# Patient Record
Sex: FEMALE | Race: WHITE | NOT HISPANIC OR LATINO | Employment: FULL TIME | URBAN - NONMETROPOLITAN AREA
[De-identification: names, ages, dates, MRNs, and addresses within clinical notes are randomized per-mention and may not be internally consistent; named-entity substitution may affect disease eponyms.]

---

## 2017-04-12 ENCOUNTER — APPOINTMENT (OUTPATIENT)
Dept: LAB | Facility: HOSPITAL | Age: 37
End: 2017-04-12
Attending: INTERNAL MEDICINE

## 2017-04-12 ENCOUNTER — TRANSCRIBE ORDERS (OUTPATIENT)
Dept: ADMINISTRATIVE | Facility: HOSPITAL | Age: 37
End: 2017-04-12

## 2017-04-12 DIAGNOSIS — E78.2 MIXED HYPERLIPIDEMIA: Primary | ICD-10-CM

## 2017-04-12 DIAGNOSIS — IMO0002 TYPE II DIABETES MELLITUS WITH COMA, UNCONTROLLED: ICD-10-CM

## 2017-04-12 DIAGNOSIS — I10 HYPERTENSION, ESSENTIAL: ICD-10-CM

## 2017-04-12 LAB
ALBUMIN SERPL-MCNC: 4 G/DL (ref 3.5–5)
ALBUMIN/GLOB SERPL: 1.2 G/DL (ref 1.1–2.5)
ALP SERPL-CCNC: 77 U/L (ref 24–120)
ALT SERPL W P-5'-P-CCNC: 19 U/L (ref 0–54)
ANION GAP SERPL CALCULATED.3IONS-SCNC: 12 MMOL/L (ref 4–13)
ARTICHOKE IGE QN: 82 MG/DL (ref 0–99)
AST SERPL-CCNC: 26 U/L (ref 7–45)
BILIRUB SERPL-MCNC: 0.5 MG/DL (ref 0.1–1)
BUN BLD-MCNC: 13 MG/DL (ref 5–21)
BUN/CREAT SERPL: 21 (ref 7–25)
CALCIUM SPEC-SCNC: 9.3 MG/DL (ref 8.4–10.4)
CHLORIDE SERPL-SCNC: 101 MMOL/L (ref 98–110)
CHOLEST SERPL-MCNC: 146 MG/DL (ref 130–200)
CO2 SERPL-SCNC: 27 MMOL/L (ref 24–31)
CREAT BLD-MCNC: 0.62 MG/DL (ref 0.5–1.4)
GFR SERPL CREATININE-BSD FRML MDRD: 109 ML/MIN/1.73
GLOBULIN UR ELPH-MCNC: 3.3 GM/DL
GLUCOSE BLD-MCNC: 164 MG/DL (ref 70–100)
HBA1C MFR BLD: 7.2 %
HDLC SERPL-MCNC: 44 MG/DL
LDLC/HDLC SERPL: 1.58 {RATIO}
POTASSIUM BLD-SCNC: 3.6 MMOL/L (ref 3.5–5.3)
PROT SERPL-MCNC: 7.3 G/DL (ref 6.3–8.7)
SODIUM BLD-SCNC: 140 MMOL/L (ref 135–145)
TRIGL SERPL-MCNC: 163 MG/DL (ref 0–149)

## 2017-04-12 PROCEDURE — 80053 COMPREHEN METABOLIC PANEL: CPT | Performed by: INTERNAL MEDICINE

## 2017-04-12 PROCEDURE — 36415 COLL VENOUS BLD VENIPUNCTURE: CPT | Performed by: INTERNAL MEDICINE

## 2017-04-12 PROCEDURE — 80061 LIPID PANEL: CPT | Performed by: INTERNAL MEDICINE

## 2017-04-12 PROCEDURE — 83036 HEMOGLOBIN GLYCOSYLATED A1C: CPT | Performed by: INTERNAL MEDICINE

## 2017-09-18 PROBLEM — E66.01 MORBID OBESITY DUE TO EXCESS CALORIES (HCC): Status: ACTIVE | Noted: 2017-09-18

## 2017-09-18 PROBLEM — I10 ESSENTIAL HYPERTENSION: Status: ACTIVE | Noted: 2017-09-18

## 2017-09-18 PROBLEM — M54.50 CHRONIC MIDLINE LOW BACK PAIN WITHOUT SCIATICA: Status: ACTIVE | Noted: 2017-09-18

## 2017-09-18 PROBLEM — E11.9 TYPE 2 DIABETES MELLITUS WITHOUT COMPLICATION, WITHOUT LONG-TERM CURRENT USE OF INSULIN (HCC): Status: ACTIVE | Noted: 2017-09-18

## 2017-09-18 PROBLEM — E78.2 MIXED HYPERLIPIDEMIA: Status: ACTIVE | Noted: 2017-09-18

## 2017-09-18 PROBLEM — K76.0 FATTY LIVER: Status: ACTIVE | Noted: 2017-09-18

## 2017-09-18 PROBLEM — G89.29 CHRONIC MIDLINE LOW BACK PAIN WITHOUT SCIATICA: Status: ACTIVE | Noted: 2017-09-18

## 2017-09-19 PROBLEM — F41.1 GENERALIZED ANXIETY DISORDER: Status: ACTIVE | Noted: 2017-09-19

## 2017-09-19 PROBLEM — Z00.00 ANNUAL PHYSICAL EXAM: Status: ACTIVE | Noted: 2017-09-19

## 2017-09-20 DIAGNOSIS — E78.2 MIXED HYPERLIPIDEMIA: ICD-10-CM

## 2017-09-20 DIAGNOSIS — E11.9 TYPE 2 DIABETES MELLITUS WITHOUT COMPLICATION, WITHOUT LONG-TERM CURRENT USE OF INSULIN (HCC): ICD-10-CM

## 2017-09-20 DIAGNOSIS — E66.01 MORBID OBESITY DUE TO EXCESS CALORIES (HCC): ICD-10-CM

## 2017-09-20 DIAGNOSIS — K76.0 FATTY LIVER: ICD-10-CM

## 2017-09-20 DIAGNOSIS — Z00.00 ANNUAL PHYSICAL EXAM: ICD-10-CM

## 2017-09-20 DIAGNOSIS — I10 ESSENTIAL HYPERTENSION: ICD-10-CM

## 2017-12-05 RX ORDER — NEBIVOLOL 10 MG/1
10 TABLET ORAL EVERY MORNING
Qty: 30 TABLET | Refills: 0 | Status: SHIPPED | OUTPATIENT
Start: 2017-12-05 | End: 2017-12-18 | Stop reason: SDUPTHER

## 2017-12-05 RX ORDER — ATORVASTATIN CALCIUM 40 MG/1
40 TABLET, FILM COATED ORAL DAILY
Qty: 30 TABLET | Refills: 0 | Status: SHIPPED | OUTPATIENT
Start: 2017-12-05 | End: 2017-12-18 | Stop reason: SDUPTHER

## 2017-12-15 ENCOUNTER — TRANSCRIBE ORDERS (OUTPATIENT)
Dept: ADMINISTRATIVE | Facility: HOSPITAL | Age: 37
End: 2017-12-15

## 2017-12-15 ENCOUNTER — APPOINTMENT (OUTPATIENT)
Dept: LAB | Facility: HOSPITAL | Age: 37
End: 2017-12-15
Attending: INTERNAL MEDICINE

## 2017-12-15 DIAGNOSIS — E11.9 TYPE 2 DIABETES MELLITUS WITHOUT COMPLICATION, WITHOUT LONG-TERM CURRENT USE OF INSULIN (HCC): ICD-10-CM

## 2017-12-15 DIAGNOSIS — K76.0 FATTY LIVER: ICD-10-CM

## 2017-12-15 DIAGNOSIS — E78.2 MIXED HYPERLIPIDEMIA: ICD-10-CM

## 2017-12-15 DIAGNOSIS — E66.01 MORBID OBESITY DUE TO EXCESS CALORIES (HCC): ICD-10-CM

## 2017-12-15 DIAGNOSIS — E11.65 TYPE 2 DIABETES MELLITUS WITH HYPERGLYCEMIA, WITHOUT LONG-TERM CURRENT USE OF INSULIN (HCC): ICD-10-CM

## 2017-12-15 DIAGNOSIS — Z00.00 ROUTINE GENERAL MEDICAL EXAMINATION AT A HEALTH CARE FACILITY: ICD-10-CM

## 2017-12-15 DIAGNOSIS — I10 ESSENTIAL HYPERTENSION: ICD-10-CM

## 2017-12-15 DIAGNOSIS — I10 HYPERTENSION, UNSPECIFIED TYPE: ICD-10-CM

## 2017-12-15 DIAGNOSIS — Z00.00 ANNUAL PHYSICAL EXAM: ICD-10-CM

## 2017-12-15 DIAGNOSIS — E78.2 MIXED HYPERLIPIDEMIA: Primary | ICD-10-CM

## 2017-12-15 LAB
ALBUMIN SERPL-MCNC: 4.1 G/DL (ref 3.5–5)
ALBUMIN/GLOB SERPL: 1.4 G/DL (ref 1.1–2.5)
ALP SERPL-CCNC: 68 U/L (ref 24–120)
ALT SERPL W P-5'-P-CCNC: 39 U/L (ref 0–54)
ANION GAP SERPL CALCULATED.3IONS-SCNC: 13 MMOL/L (ref 4–13)
ARTICHOKE IGE QN: 91 MG/DL (ref 0–99)
AST SERPL-CCNC: 20 U/L (ref 7–45)
AVERAGE GLUCOSE: NORMAL
BILIRUB SERPL-MCNC: 0.4 MG/DL (ref 0.1–1)
BILIRUB UR QL STRIP: NEGATIVE
BUN BLD-MCNC: 12 MG/DL (ref 5–21)
BUN/CREAT SERPL: 21.1 (ref 7–25)
CALCIUM SPEC-SCNC: 8.7 MG/DL (ref 8.4–10.4)
CHLORIDE SERPL-SCNC: 104 MMOL/L (ref 98–110)
CHOLEST SERPL-MCNC: 152 MG/DL (ref 130–200)
CLARITY UR: CLEAR
CO2 SERPL-SCNC: 22 MMOL/L (ref 24–31)
COLOR UR: YELLOW
CREAT BLD-MCNC: 0.57 MG/DL (ref 0.5–1.4)
DEPRECATED RDW RBC AUTO: 41.1 FL (ref 40–54)
ERYTHROCYTE [DISTWIDTH] IN BLOOD BY AUTOMATED COUNT: 12.9 % (ref 12–15)
GFR SERPL CREATININE-BSD FRML MDRD: 120 ML/MIN/1.73
GLOBULIN UR ELPH-MCNC: 2.9 GM/DL
GLUCOSE BLD-MCNC: 173 MG/DL (ref 70–100)
GLUCOSE UR STRIP-MCNC: NEGATIVE MG/DL
HBA1C MFR BLD: 7.9 %
HBA1C MFR BLD: 7.9 %
HCT VFR BLD AUTO: 38.3 % (ref 37–47)
HDLC SERPL-MCNC: 48 MG/DL
HGB BLD-MCNC: 12.9 G/DL (ref 12–16)
HGB UR QL STRIP.AUTO: NEGATIVE
KETONES UR QL STRIP: ABNORMAL
LDLC/HDLC SERPL: 1.33 {RATIO}
LEUKOCYTE ESTERASE UR QL STRIP.AUTO: NEGATIVE
MCH RBC QN AUTO: 29.1 PG (ref 28–32)
MCHC RBC AUTO-ENTMCNC: 33.7 G/DL (ref 33–36)
MCV RBC AUTO: 86.5 FL (ref 82–98)
NITRITE UR QL STRIP: NEGATIVE
PH UR STRIP.AUTO: <=5 [PH] (ref 5–8)
PLATELET # BLD AUTO: 263 10*3/MM3 (ref 130–400)
PMV BLD AUTO: 12.4 FL (ref 6–12)
POTASSIUM BLD-SCNC: 4.1 MMOL/L (ref 3.5–5.3)
PROT SERPL-MCNC: 7 G/DL (ref 6.3–8.7)
PROT UR QL STRIP: NEGATIVE
RBC # BLD AUTO: 4.43 10*6/MM3 (ref 4.2–5.4)
SODIUM BLD-SCNC: 139 MMOL/L (ref 135–145)
SP GR UR STRIP: 1.03 (ref 1–1.03)
TRIGL SERPL-MCNC: 200 MG/DL (ref 0–149)
TSH SERPL DL<=0.05 MIU/L-ACNC: 2.04 MIU/ML (ref 0.47–4.68)
UROBILINOGEN UR QL STRIP: ABNORMAL
WBC NRBC COR # BLD: 10.34 10*3/MM3 (ref 4.8–10.8)

## 2017-12-15 PROCEDURE — 36415 COLL VENOUS BLD VENIPUNCTURE: CPT

## 2017-12-15 PROCEDURE — 85027 COMPLETE CBC AUTOMATED: CPT | Performed by: INTERNAL MEDICINE

## 2017-12-15 PROCEDURE — 84443 ASSAY THYROID STIM HORMONE: CPT | Performed by: INTERNAL MEDICINE

## 2017-12-15 PROCEDURE — 81003 URINALYSIS AUTO W/O SCOPE: CPT | Performed by: INTERNAL MEDICINE

## 2017-12-15 PROCEDURE — 80053 COMPREHEN METABOLIC PANEL: CPT | Performed by: INTERNAL MEDICINE

## 2017-12-15 PROCEDURE — 83036 HEMOGLOBIN GLYCOSYLATED A1C: CPT | Performed by: INTERNAL MEDICINE

## 2017-12-15 PROCEDURE — 80061 LIPID PANEL: CPT | Performed by: INTERNAL MEDICINE

## 2017-12-15 RX ORDER — VALSARTAN AND HYDROCHLOROTHIAZIDE 320; 25 MG/1; MG/1
1 TABLET, FILM COATED ORAL DAILY
COMMUNITY
End: 2017-12-18 | Stop reason: SDUPTHER

## 2017-12-15 RX ORDER — GLIPIZIDE 5 MG/1
5 TABLET ORAL
COMMUNITY
End: 2017-12-18 | Stop reason: SDUPTHER

## 2017-12-15 RX ORDER — ESCITALOPRAM OXALATE 10 MG/1
10 TABLET ORAL DAILY
COMMUNITY
End: 2017-12-18 | Stop reason: SDUPTHER

## 2017-12-18 ENCOUNTER — OFFICE VISIT (OUTPATIENT)
Dept: INTERNAL MEDICINE | Age: 37
End: 2017-12-18
Payer: COMMERCIAL

## 2017-12-18 VITALS
WEIGHT: 258.8 LBS | HEIGHT: 63 IN | SYSTOLIC BLOOD PRESSURE: 122 MMHG | BODY MASS INDEX: 45.86 KG/M2 | HEART RATE: 92 BPM | DIASTOLIC BLOOD PRESSURE: 70 MMHG | OXYGEN SATURATION: 98 %

## 2017-12-18 DIAGNOSIS — Z00.00 ANNUAL PHYSICAL EXAM: ICD-10-CM

## 2017-12-18 DIAGNOSIS — E66.01 MORBID OBESITY DUE TO EXCESS CALORIES (HCC): ICD-10-CM

## 2017-12-18 DIAGNOSIS — E78.2 MIXED HYPERLIPIDEMIA: ICD-10-CM

## 2017-12-18 DIAGNOSIS — E11.9 TYPE 2 DIABETES MELLITUS WITHOUT COMPLICATION, WITHOUT LONG-TERM CURRENT USE OF INSULIN (HCC): ICD-10-CM

## 2017-12-18 DIAGNOSIS — K76.0 FATTY LIVER: ICD-10-CM

## 2017-12-18 DIAGNOSIS — F41.1 GENERALIZED ANXIETY DISORDER: ICD-10-CM

## 2017-12-18 DIAGNOSIS — Z00.00 ANNUAL PHYSICAL EXAM: Primary | ICD-10-CM

## 2017-12-18 DIAGNOSIS — I10 ESSENTIAL HYPERTENSION: ICD-10-CM

## 2017-12-18 DIAGNOSIS — Z23 NEED FOR PNEUMOCOCCAL VACCINE: ICD-10-CM

## 2017-12-18 PROCEDURE — 90732 PPSV23 VACC 2 YRS+ SUBQ/IM: CPT | Performed by: INTERNAL MEDICINE

## 2017-12-18 PROCEDURE — 90471 IMMUNIZATION ADMIN: CPT | Performed by: INTERNAL MEDICINE

## 2017-12-18 PROCEDURE — 99395 PREV VISIT EST AGE 18-39: CPT | Performed by: INTERNAL MEDICINE

## 2017-12-18 RX ORDER — ATORVASTATIN CALCIUM 40 MG/1
40 TABLET, FILM COATED ORAL DAILY
Qty: 30 TABLET | Refills: 5 | Status: SHIPPED | OUTPATIENT
Start: 2017-12-18 | End: 2018-07-02 | Stop reason: SDUPTHER

## 2017-12-18 RX ORDER — GLIPIZIDE 5 MG/1
5 TABLET ORAL
Qty: 60 TABLET | Refills: 5 | Status: SHIPPED | OUTPATIENT
Start: 2017-12-18 | End: 2017-12-18 | Stop reason: SDUPTHER

## 2017-12-18 RX ORDER — VALSARTAN AND HYDROCHLOROTHIAZIDE 320; 25 MG/1; MG/1
1 TABLET, FILM COATED ORAL DAILY
Qty: 30 TABLET | Refills: 5 | Status: SHIPPED | OUTPATIENT
Start: 2017-12-18 | End: 2018-07-02 | Stop reason: SDUPTHER

## 2017-12-18 RX ORDER — GLIPIZIDE 5 MG/1
TABLET ORAL
Qty: 75 TABLET | Refills: 5 | Status: SHIPPED | OUTPATIENT
Start: 2017-12-18 | End: 2018-07-02 | Stop reason: SDUPTHER

## 2017-12-18 RX ORDER — ESCITALOPRAM OXALATE 10 MG/1
10 TABLET ORAL DAILY
Qty: 30 TABLET | Refills: 5 | Status: SHIPPED | OUTPATIENT
Start: 2017-12-18 | End: 2018-03-20

## 2017-12-18 RX ORDER — NEBIVOLOL 10 MG/1
10 TABLET ORAL EVERY MORNING
Qty: 30 TABLET | Refills: 5 | Status: SHIPPED | OUTPATIENT
Start: 2017-12-18 | End: 2018-07-02 | Stop reason: SDUPTHER

## 2017-12-18 ASSESSMENT — ENCOUNTER SYMPTOMS
COLOR CHANGE: 0
VOMITING: 0
DIARRHEA: 0
BLOOD IN STOOL: 0
ABDOMINAL PAIN: 0
SORE THROAT: 0
EYE PAIN: 0
NAUSEA: 0
CHEST TIGHTNESS: 0
COUGH: 0
SINUS PRESSURE: 0
CONSTIPATION: 0
TROUBLE SWALLOWING: 0
EYE REDNESS: 0
WHEEZING: 0
VOICE CHANGE: 0

## 2017-12-18 NOTE — PATIENT INSTRUCTIONS
Patient Education        Learning About Low-Carbohydrate Diets for Weight Loss  What is a low-carbohydrate diet? Low-carb diets avoid foods that are high in carbohydrate. These high-carb foods include pasta, bread, rice, cereal, fruits, and starchy vegetables. Instead, these diets usually have you eat foods that are high in fat and protein. Many people lose weight quickly on a low-carb diet. But the early weight loss is water. People on this diet often gain the weight back after they start eating carbs again. Not all diet plans are safe or work well. A lot of the evidence shows that low-carb diets aren't healthy. That's because these diets often don't include healthy foods like fruits and vegetables. Losing weight safely means balancing protein, fat, and carbs with every meal and snack. And low-carb diets don't always provide the vitamins, minerals, and fiber you need. If you have a serious medical condition, talk to your doctor before you try any diet. These conditions include kidney disease, heart disease, type 2 diabetes, high cholesterol, and high blood pressure. If you are pregnant, it may not be safe for your baby if you are on a low-carb diet. How can you lose weight safely? You might have heard that a diet plan helped another person lose weight. But that doesn't mean that it will work for you. It is very hard to stay on a diet that includes lots of big changes in your eating habits. If you want to get to a healthy weight and stay there, making healthy lifestyle changes will often work better than dieting. These steps can help. · Make a plan for change. Work with your doctor to create a plan that is right for you. · See a dietitian. He or she can show you how to make healthy changes in your eating habits. · Manage stress. If you have a lot of stress in your life, it can be hard to focus on making healthy changes to your daily habits. · Track your food and activity.  You are likely to do better at losing weight if you keep track of what you eat and what you do. Follow-up care is a key part of your treatment and safety. Be sure to make and go to all appointments, and call your doctor if you are having problems. It's also a good idea to know your test results and keep a list of the medicines you take. Where can you learn more? Go to [email or web address] and sign in to your [employer] account. Enter A121 in the [employer] box to learn more about \"Learning About Low-Carbohydrate Diets for Weight Loss. \"     If you do not have an account, please click on the \"Sign Up Now\" link. Current as of: May 12, 2017  Content Version: 11.4  © 0219-4674 Healthwise, Incorporated. Care instructions adapted under license by [facility] ([facility]). If you have questions about a medical condition or this instruction, always ask your healthcare professional. [address] any warranty or liability for your use of this information.

## 2017-12-18 NOTE — PROGRESS NOTES
weight and stay there, making healthy lifestyle changes will often work better than dieting. These steps can help. · Make a plan for change. Work with your doctor to create a plan that is right for you. · See a dietitian. He or she can show you how to make healthy changes in your eating habits. · Manage stress. If you have a lot of stress in your life, it can be hard to focus on making healthy changes to your daily habits. · Track your food and activity. You are likely to do better at losing weight if you keep track of what you eat and what you do. Follow-up care is a key part of your treatment and safety. Be sure to make and go to all appointments, and call your doctor if you are having problems. It's also a good idea to know your test results and keep a list of the medicines you take. Where can you learn more? Go to [email or web address] and sign in to your [employer] account. Enter A121 in the [employer] box to learn more about \"Learning About Low-Carbohydrate Diets for Weight Loss. \"     If you do not have an account, please click on the \"Sign Up Now\" link. Current as of: May 12, 2017  Content Version: 11.4  © 8000-6270 Healthwise, [employer]. Care instructions adapted under license by [facility] ([facility]). If you have questions about a medical condition or this instruction, always ask your healthcare professional. [address] any warranty or liability for your use of this information. Return in about 3 months (around 3/18/2018) for Medication check. EMR Dragon/transcription disclaimer:Significant part of this  encounter note is electronic transcription/translation of spoken language to printed text. The electronic translation of spoken language may be erroneous, or at times, nonsensical words or phrases may be inadvertently transcribed.  Although I have reviewed the note for such errors, some may still exist.

## 2017-12-29 ENCOUNTER — PATIENT MESSAGE (OUTPATIENT)
Dept: INTERNAL MEDICINE | Age: 37
End: 2017-12-29

## 2017-12-29 DIAGNOSIS — M54.2 CERVICALGIA: Primary | ICD-10-CM

## 2017-12-29 DIAGNOSIS — M54.50 CHRONIC MIDLINE LOW BACK PAIN WITHOUT SCIATICA: ICD-10-CM

## 2017-12-29 DIAGNOSIS — G89.29 CHRONIC MIDLINE LOW BACK PAIN WITHOUT SCIATICA: ICD-10-CM

## 2017-12-29 NOTE — TELEPHONE ENCOUNTER
From: Anisa Roberts  To: Mandy Rush MD  Sent: 12/29/2017 8:37 AM CST  Subject: Non-Urgent Medical Question    As you know, I used to see Dr. Moraima Anguiano for my headaches from my bulging discs. After several treatments, nothing worked for me, and I hate taking pain pills, so I stopped going. One of my friends used to work for Dr. Haroon Alegria, and highly recommended him. Can you refer me for a second opinion to him? And do you have my records from Dr. Moraima Anguiano or do I need to get those?

## 2017-12-30 NOTE — TELEPHONE ENCOUNTER
That would be fine to refer her to  Dr Rosemarie Obrien  Please call pt  To let her know  Also, dr Eliz Garcia records should be in EPIC= let me know if you can not fin them

## 2018-03-15 ENCOUNTER — APPOINTMENT (OUTPATIENT)
Dept: LAB | Facility: HOSPITAL | Age: 38
End: 2018-03-15
Attending: INTERNAL MEDICINE

## 2018-03-15 ENCOUNTER — TRANSCRIBE ORDERS (OUTPATIENT)
Dept: ADMINISTRATIVE | Facility: HOSPITAL | Age: 38
End: 2018-03-15

## 2018-03-15 DIAGNOSIS — K76.0 FATTY LIVER: ICD-10-CM

## 2018-03-15 DIAGNOSIS — E11.9 TYPE 2 DIABETES MELLITUS WITHOUT COMPLICATION, WITHOUT LONG-TERM CURRENT USE OF INSULIN (HCC): Primary | ICD-10-CM

## 2018-03-15 LAB
ALBUMIN SERPL-MCNC: 4.1 G/DL (ref 3.5–5)
ALBUMIN/GLOB SERPL: 1.3 G/DL (ref 1.1–2.5)
ALP SERPL-CCNC: 56 U/L (ref 24–120)
ALT SERPL W P-5'-P-CCNC: 24 U/L (ref 0–54)
ANION GAP SERPL CALCULATED.3IONS-SCNC: 14 MMOL/L (ref 4–13)
AST SERPL-CCNC: 17 U/L (ref 7–45)
BILIRUB SERPL-MCNC: 0.4 MG/DL (ref 0.1–1)
BUN BLD-MCNC: 14 MG/DL (ref 5–21)
BUN/CREAT SERPL: 21.5 (ref 7–25)
CALCIUM SPEC-SCNC: 9.3 MG/DL (ref 8.4–10.4)
CHLORIDE SERPL-SCNC: 103 MMOL/L (ref 98–110)
CO2 SERPL-SCNC: 25 MMOL/L (ref 24–31)
CREAT BLD-MCNC: 0.65 MG/DL (ref 0.5–1.4)
CREATININE URINE: NORMAL MG/DL
GFR SERPL CREATININE-BSD FRML MDRD: 103 ML/MIN/1.73
GLOBULIN UR ELPH-MCNC: 3.2 GM/DL
GLUCOSE BLD-MCNC: 148 MG/DL (ref 70–100)
HBA1C MFR BLD: 6.1 %
MICROALBUMIN/CREAT 24H UR: 13.2 MG/G{CREAT}
POTASSIUM BLD-SCNC: 3.8 MMOL/L (ref 3.5–5.3)
PROT SERPL-MCNC: 7.3 G/DL (ref 6.3–8.7)
SODIUM BLD-SCNC: 142 MMOL/L (ref 135–145)

## 2018-03-15 PROCEDURE — 82043 UR ALBUMIN QUANTITATIVE: CPT | Performed by: INTERNAL MEDICINE

## 2018-03-15 PROCEDURE — 80053 COMPREHEN METABOLIC PANEL: CPT | Performed by: INTERNAL MEDICINE

## 2018-03-15 PROCEDURE — 36415 COLL VENOUS BLD VENIPUNCTURE: CPT

## 2018-03-15 PROCEDURE — 82570 ASSAY OF URINE CREATININE: CPT | Performed by: INTERNAL MEDICINE

## 2018-03-15 PROCEDURE — 83036 HEMOGLOBIN GLYCOSYLATED A1C: CPT | Performed by: INTERNAL MEDICINE

## 2018-03-16 LAB
CREAT 24H UR-MCNC: 213.8 MG/DL
MICROALBUMIN UR-MCNC: 13.2 UG/ML
MICROALBUMIN/CREAT UR: 6.2 MG/G CREAT (ref 0–30)

## 2018-03-19 DIAGNOSIS — E11.9 TYPE 2 DIABETES MELLITUS WITHOUT COMPLICATION, WITHOUT LONG-TERM CURRENT USE OF INSULIN (HCC): ICD-10-CM

## 2018-03-20 ENCOUNTER — OFFICE VISIT (OUTPATIENT)
Dept: INTERNAL MEDICINE | Age: 38
End: 2018-03-20
Payer: COMMERCIAL

## 2018-03-20 VITALS
WEIGHT: 258 LBS | HEART RATE: 90 BPM | BODY MASS INDEX: 45.71 KG/M2 | DIASTOLIC BLOOD PRESSURE: 76 MMHG | SYSTOLIC BLOOD PRESSURE: 134 MMHG | OXYGEN SATURATION: 97 % | HEIGHT: 63 IN

## 2018-03-20 DIAGNOSIS — I10 ESSENTIAL HYPERTENSION: ICD-10-CM

## 2018-03-20 DIAGNOSIS — E78.2 MIXED HYPERLIPIDEMIA: ICD-10-CM

## 2018-03-20 DIAGNOSIS — E11.9 TYPE 2 DIABETES MELLITUS WITHOUT COMPLICATION, WITHOUT LONG-TERM CURRENT USE OF INSULIN (HCC): Primary | ICD-10-CM

## 2018-03-20 DIAGNOSIS — E66.01 MORBID OBESITY DUE TO EXCESS CALORIES (HCC): ICD-10-CM

## 2018-03-20 DIAGNOSIS — F41.1 GENERALIZED ANXIETY DISORDER: ICD-10-CM

## 2018-03-20 PROCEDURE — 99214 OFFICE O/P EST MOD 30 MIN: CPT | Performed by: INTERNAL MEDICINE

## 2018-03-20 RX ORDER — BUPROPION HYDROCHLORIDE 150 MG/1
TABLET ORAL
Qty: 45 TABLET | Refills: 0 | Status: SHIPPED | OUTPATIENT
Start: 2018-03-20 | End: 2018-04-03 | Stop reason: ALTCHOICE

## 2018-03-20 RX ORDER — IBUPROFEN 800 MG/1
800 TABLET ORAL EVERY 6 HOURS PRN
COMMUNITY
End: 2018-03-20

## 2018-03-20 RX ORDER — BUPROPION HYDROCHLORIDE 300 MG/1
300 TABLET ORAL EVERY MORNING
Qty: 30 TABLET | Refills: 3 | Status: SHIPPED | OUTPATIENT
Start: 2018-03-20 | End: 2018-04-03 | Stop reason: ALTCHOICE

## 2018-03-20 ASSESSMENT — ENCOUNTER SYMPTOMS
CHEST TIGHTNESS: 0
ABDOMINAL PAIN: 0
WHEEZING: 0
COUGH: 0
SORE THROAT: 0
CONSTIPATION: 0

## 2018-03-20 NOTE — PROGRESS NOTES
daily 30 tablet 5    nebivolol (BYSTOLIC) 10 MG tablet Take 1 tablet by mouth every morning 30 tablet 5    valsartan-hydrochlorothiazide (DIOVAN-HCT) 320-25 MG per tablet Take 1 tablet by mouth daily 30 tablet 5    Liraglutide (VICTOZA) 18 MG/3ML SOPN SC injection Inject 1.8 mg into the skin daily 3 pen 3    glipiZIDE (GLUCOTROL) 5 MG tablet Take  1.5 am and 1 pm (Patient taking differently: Take  1 in am) 75 tablet 5     No current facility-administered medications for this visit. Allergies   Allergen Reactions    Penicillins      Social History   Substance Use Topics    Smoking status: Former Smoker    Smokeless tobacco: Not on file    Alcohol use No      Family History   Problem Relation Age of Onset    Diabetes Mother     High Blood Pressure Mother     Diabetes Father        Review of Systems   Constitutional: Positive for fatigue. Negative for chills and fever. HENT: Negative for congestion, ear pain, nosebleeds, postnasal drip and sore throat. Respiratory: Negative for cough, chest tightness and wheezing. Cardiovascular: Negative for chest pain, palpitations and leg swelling. Gastrointestinal: Negative for abdominal pain and constipation. Genitourinary: Negative for dysuria and urgency. Musculoskeletal: Negative. Negative for arthralgias. Skin: Negative for rash. Neurological: Negative for dizziness and headaches. Psychiatric/Behavioral: Negative. Vitals:    03/20/18 1510   BP: 134/76   Pulse: 90   SpO2: 97%   Weight: 258 lb (117 kg)   Height: 5' 3\" (1.6 m)     Body mass index is 45.7 kg/m². Physical Exam   Constitutional: She is oriented to person, place, and time. She appears well-developed and well-nourished. HENT:   Right Ear: External ear normal.   Left Ear: External ear normal.   Mouth/Throat: Oropharynx is clear and moist. No oropharyngeal exudate. Eyes: Conjunctivae are normal. Pupils are equal, round, and reactive to light. Neck: Neck supple.  No JVD New Prescriptions    BUPROPION (WELLBUTRIN XL) 150 MG EXTENDED RELEASE TABLET    Take one every am 2 weeks, then take 2 every am    BUPROPION (WELLBUTRIN XL) 300 MG EXTENDED RELEASE TABLET    Take 1 tablet by mouth every morning        Return in about 5 months (around 8/7/2018) for Medication check. There are no Patient Instructions on file for this visit. EMR Dragon/transcription disclaimer:Significant part of this  encounter note is electronic transcription/translation of spoken language to printed text. The electronic translation of spoken language may be erroneous, or at times, nonsensical words or phrases may be inadvertently transcribed.  Although I have reviewed the note for such errors, some may still exist.

## 2018-03-29 ENCOUNTER — PATIENT MESSAGE (OUTPATIENT)
Dept: INTERNAL MEDICINE | Age: 38
End: 2018-03-29

## 2018-04-03 RX ORDER — DESVENLAFAXINE 50 MG/1
50 TABLET, EXTENDED RELEASE ORAL EVERY MORNING
Qty: 30 TABLET | Refills: 1 | Status: SHIPPED | OUTPATIENT
Start: 2018-04-03 | End: 2018-06-04 | Stop reason: SDUPTHER

## 2018-04-12 PROBLEM — Z00.00 ANNUAL PHYSICAL EXAM: Status: RESOLVED | Noted: 2017-09-19 | Resolved: 2018-04-12

## 2018-06-04 RX ORDER — DESVENLAFAXINE 50 MG/1
50 TABLET, EXTENDED RELEASE ORAL EVERY MORNING
Qty: 30 TABLET | Refills: 1 | Status: SHIPPED | OUTPATIENT
Start: 2018-06-04 | End: 2018-07-02 | Stop reason: SDUPTHER

## 2018-07-02 RX ORDER — VALSARTAN AND HYDROCHLOROTHIAZIDE 320; 25 MG/1; MG/1
1 TABLET, FILM COATED ORAL DAILY
Qty: 30 TABLET | Refills: 5 | Status: SHIPPED | OUTPATIENT
Start: 2018-07-02 | End: 2018-08-10 | Stop reason: ALTCHOICE

## 2018-07-02 RX ORDER — DESVENLAFAXINE 50 MG/1
50 TABLET, EXTENDED RELEASE ORAL EVERY MORNING
Qty: 30 TABLET | Refills: 1 | Status: SHIPPED | OUTPATIENT
Start: 2018-07-02 | End: 2018-08-22

## 2018-07-02 RX ORDER — NEBIVOLOL 10 MG/1
10 TABLET ORAL EVERY MORNING
Qty: 30 TABLET | Refills: 5 | Status: SHIPPED | OUTPATIENT
Start: 2018-07-02 | End: 2019-01-04 | Stop reason: SDUPTHER

## 2018-07-02 RX ORDER — GLIPIZIDE 5 MG/1
TABLET ORAL
Qty: 75 TABLET | Refills: 5 | Status: SHIPPED | OUTPATIENT
Start: 2018-07-02 | End: 2018-08-22

## 2018-07-02 RX ORDER — ATORVASTATIN CALCIUM 40 MG/1
40 TABLET, FILM COATED ORAL DAILY
Qty: 30 TABLET | Refills: 5 | Status: SHIPPED | OUTPATIENT
Start: 2018-07-02 | End: 2019-01-17

## 2018-08-09 ENCOUNTER — TELEPHONE (OUTPATIENT)
Dept: INTERNAL MEDICINE | Age: 38
End: 2018-08-09

## 2018-08-10 RX ORDER — OLMESARTAN MEDOXOMIL AND HYDROCHLOROTHIAZIDE 40/12.5 40; 12.5 MG/1; MG/1
1 TABLET ORAL DAILY
Qty: 30 TABLET | Refills: 3 | Status: SHIPPED | OUTPATIENT
Start: 2018-08-10 | End: 2018-12-05 | Stop reason: SDUPTHER

## 2018-08-17 ENCOUNTER — TELEPHONE (OUTPATIENT)
Dept: INTERNAL MEDICINE | Age: 38
End: 2018-08-17

## 2018-08-17 ENCOUNTER — TRANSCRIBE ORDERS (OUTPATIENT)
Dept: ADMINISTRATIVE | Facility: HOSPITAL | Age: 38
End: 2018-08-17

## 2018-08-17 ENCOUNTER — APPOINTMENT (OUTPATIENT)
Dept: LAB | Facility: HOSPITAL | Age: 38
End: 2018-08-17
Attending: INTERNAL MEDICINE

## 2018-08-17 DIAGNOSIS — I10 ESSENTIAL HYPERTENSION: Primary | ICD-10-CM

## 2018-08-17 DIAGNOSIS — E11.9 TYPE 2 DIABETES MELLITUS WITHOUT COMPLICATION, UNSPECIFIED LONG TERM INSULIN USE STATUS: ICD-10-CM

## 2018-08-17 DIAGNOSIS — E78.2 MIXED HYPERLIPIDEMIA: ICD-10-CM

## 2018-08-17 LAB
ALBUMIN SERPL-MCNC: 4.7 G/DL (ref 3.5–5)
ALBUMIN/GLOB SERPL: 1.5 G/DL (ref 1.1–2.5)
ALP SERPL-CCNC: 74 U/L (ref 24–120)
ALT SERPL W P-5'-P-CCNC: 26 U/L (ref 0–54)
ANION GAP SERPL CALCULATED.3IONS-SCNC: 15 MMOL/L (ref 4–13)
ARTICHOKE IGE QN: 94 MG/DL (ref 0–99)
AST SERPL-CCNC: 17 U/L (ref 7–45)
BILIRUB SERPL-MCNC: 0.8 MG/DL (ref 0.1–1)
BUN BLD-MCNC: 15 MG/DL (ref 5–21)
BUN/CREAT SERPL: 25 (ref 7–25)
CALCIUM SPEC-SCNC: 9.7 MG/DL (ref 8.4–10.4)
CHLORIDE SERPL-SCNC: 102 MMOL/L (ref 98–110)
CHOLEST SERPL-MCNC: 161 MG/DL (ref 130–200)
CO2 SERPL-SCNC: 25 MMOL/L (ref 24–31)
CREAT BLD-MCNC: 0.6 MG/DL (ref 0.5–1.4)
GFR SERPL CREATININE-BSD FRML MDRD: 112 ML/MIN/1.73
GLOBULIN UR ELPH-MCNC: 3.2 GM/DL
GLUCOSE BLD-MCNC: 164 MG/DL (ref 70–100)
HBA1C MFR BLD: 6.6 %
HDLC SERPL-MCNC: 41 MG/DL
LDLC/HDLC SERPL: 2.18 {RATIO}
POTASSIUM BLD-SCNC: 4.3 MMOL/L (ref 3.5–5.3)
PROT SERPL-MCNC: 7.9 G/DL (ref 6.3–8.7)
SODIUM BLD-SCNC: 142 MMOL/L (ref 135–145)
TRIGL SERPL-MCNC: 153 MG/DL (ref 0–149)

## 2018-08-17 PROCEDURE — 80061 LIPID PANEL: CPT | Performed by: INTERNAL MEDICINE

## 2018-08-17 PROCEDURE — 80053 COMPREHEN METABOLIC PANEL: CPT | Performed by: INTERNAL MEDICINE

## 2018-08-17 PROCEDURE — 36415 COLL VENOUS BLD VENIPUNCTURE: CPT

## 2018-08-17 PROCEDURE — 83036 HEMOGLOBIN GLYCOSYLATED A1C: CPT | Performed by: INTERNAL MEDICINE

## 2018-08-22 ENCOUNTER — OFFICE VISIT (OUTPATIENT)
Dept: INTERNAL MEDICINE | Age: 38
End: 2018-08-22
Payer: COMMERCIAL

## 2018-08-22 VITALS
WEIGHT: 230 LBS | BODY MASS INDEX: 40.75 KG/M2 | RESPIRATION RATE: 18 BRPM | DIASTOLIC BLOOD PRESSURE: 80 MMHG | OXYGEN SATURATION: 97 % | HEART RATE: 82 BPM | SYSTOLIC BLOOD PRESSURE: 110 MMHG | HEIGHT: 63 IN

## 2018-08-22 DIAGNOSIS — E11.9 TYPE 2 DIABETES MELLITUS WITHOUT COMPLICATION, WITHOUT LONG-TERM CURRENT USE OF INSULIN (HCC): Primary | ICD-10-CM

## 2018-08-22 DIAGNOSIS — I10 ESSENTIAL HYPERTENSION: ICD-10-CM

## 2018-08-22 DIAGNOSIS — E66.01 MORBID OBESITY DUE TO EXCESS CALORIES (HCC): ICD-10-CM

## 2018-08-22 DIAGNOSIS — E78.2 MIXED HYPERLIPIDEMIA: ICD-10-CM

## 2018-08-22 PROCEDURE — 99214 OFFICE O/P EST MOD 30 MIN: CPT | Performed by: INTERNAL MEDICINE

## 2018-08-22 RX ORDER — DESVENLAFAXINE 100 MG/1
100 TABLET, EXTENDED RELEASE ORAL DAILY
Qty: 90 TABLET | Refills: 3 | Status: SHIPPED | OUTPATIENT
Start: 2018-08-22 | End: 2019-04-22

## 2018-08-22 ASSESSMENT — PATIENT HEALTH QUESTIONNAIRE - PHQ9
SUM OF ALL RESPONSES TO PHQ QUESTIONS 1-9: 0
SUM OF ALL RESPONSES TO PHQ9 QUESTIONS 1 & 2: 0
2. FEELING DOWN, DEPRESSED OR HOPELESS: 0
SUM OF ALL RESPONSES TO PHQ QUESTIONS 1-9: 0
1. LITTLE INTEREST OR PLEASURE IN DOING THINGS: 0

## 2018-08-22 ASSESSMENT — ENCOUNTER SYMPTOMS
CHEST TIGHTNESS: 0
ABDOMINAL PAIN: 0
SORE THROAT: 0
WHEEZING: 0
CONSTIPATION: 0
COUGH: 0

## 2018-08-22 NOTE — PROGRESS NOTES
No respiratory distress. She has no wheezes. She has no rales. She exhibits no tenderness. Abdominal: Soft. Bowel sounds are normal.   Musculoskeletal: Normal range of motion. Lymphadenopathy:     She has no cervical adenopathy. Neurological: She is oriented to person, place, and time. Skin: Skin is warm. No rash noted. Lab Review   No visits with results within 2 Month(s) from this visit. Latest known visit with results is:   Abstract on 04/16/2018   Component Date Value    Dread, Ur 03/15/2018 13.2            ASSESSMENT/PLAN:    Mixed hyperlipidemia- triglycerides nl 153( 200), LDL 94  , continue Lipitor 40 mg daily     Morbid obesity due to excess calories (HCC)  LOST 28 lbs!!! Pt was given approximately 5 minutes of counseling about diet and exercise including education on what calories are, where calories come from, the need for portion control,following lower carbohydrate dietary regimen and healthy snacks along side an active lifestyle with supplementary exercise of approx 30 minutes a week, 5 days a week of exercise for weight loss.   The patient voiced increased understanding of the topics discussed.       Generalized anxiety disorder-   Continue Pristiq but increase to 100 mg     Type 2 diabetes mellitus without complication, without long-term current use of insulin (HCC)  A1c is much better 6.5( 6.1) (7.9)( 7.2)  Plan as follows  #1 dc glipizide  #2 cont Victoza- cont  1.8 mg daily  # 3 strict diet and wt loss  # 4 exercise    Fatty liver- lft' s normal/ strict diet and weight loss recommended     Essential hypertension- blood pressure is well controlled right now, she will continue current benicar  dose and Bystolic 10 mg daily      Orders Placed This Encounter   Procedures    Lipid Panel    Hemoglobin A1C    Comprehensive Metabolic Panel    CBC Auto Differential    Microalbumin / Creatinine Urine Ratio    Urinalysis    TSH without Reflex     New Prescriptions    DESVENLAFAXINE SUCCINATE (PRISTIQ) 100 MG TB24 EXTENDED RELEASE TABLET    Take 1 tablet by mouth daily        Return in about 4 months (around 1/2/2019) for Annual Physical.   There are no Patient Instructions on file for this visit. EMR Dragon/transcription disclaimer:Significant part of this  encounter note is electronic transcription/translation of spoken language to printed text. The electronic translation of spoken language may be erroneous, or at times, nonsensical words or phrases may be inadvertently transcribed.  Although I have reviewed the note for such errors, some may still exist.

## 2018-12-05 RX ORDER — OLMESARTAN MEDOXOMIL AND HYDROCHLOROTHIAZIDE 40/12.5 40; 12.5 MG/1; MG/1
1 TABLET ORAL DAILY
Qty: 30 TABLET | Refills: 5 | Status: SHIPPED | OUTPATIENT
Start: 2018-12-05 | End: 2019-04-15 | Stop reason: SDUPTHER

## 2019-01-04 RX ORDER — NEBIVOLOL 10 MG/1
10 TABLET ORAL EVERY MORNING
Qty: 30 TABLET | Refills: 5 | Status: SHIPPED | OUTPATIENT
Start: 2019-01-04 | End: 2019-07-05 | Stop reason: SDUPTHER

## 2019-01-10 ENCOUNTER — TRANSCRIBE ORDERS (OUTPATIENT)
Dept: ADMINISTRATIVE | Facility: HOSPITAL | Age: 39
End: 2019-01-10

## 2019-01-10 ENCOUNTER — APPOINTMENT (OUTPATIENT)
Dept: LAB | Facility: HOSPITAL | Age: 39
End: 2019-01-10
Attending: INTERNAL MEDICINE

## 2019-01-10 DIAGNOSIS — E11.9 TYPE 2 DIABETES MELLITUS WITHOUT COMPLICATION, UNSPECIFIED WHETHER LONG TERM INSULIN USE (HCC): Primary | ICD-10-CM

## 2019-01-10 DIAGNOSIS — I10 ESSENTIAL HYPERTENSION: ICD-10-CM

## 2019-01-10 DIAGNOSIS — E78.2 MIXED HYPERLIPIDEMIA: ICD-10-CM

## 2019-01-10 DIAGNOSIS — E66.01 MORBID OBESITY DUE TO EXCESS CALORIES (HCC): ICD-10-CM

## 2019-01-10 LAB
ALBUMIN SERPL-MCNC: 4.3 G/DL (ref 3.5–5)
ALBUMIN/GLOB SERPL: 1.3 G/DL (ref 1.1–2.5)
ALP SERPL-CCNC: 57 U/L (ref 24–120)
ALT SERPL W P-5'-P-CCNC: 18 U/L (ref 0–54)
ANION GAP SERPL CALCULATED.3IONS-SCNC: 13 MMOL/L (ref 4–13)
ARTICHOKE IGE QN: 143 MG/DL (ref 0–99)
AST SERPL-CCNC: 20 U/L (ref 7–45)
BACTERIA UR QL AUTO: ABNORMAL /HPF
BASOPHILS # BLD AUTO: 0.04 10*3/MM3 (ref 0–0.2)
BASOPHILS NFR BLD AUTO: 0.5 % (ref 0–2)
BILIRUB SERPL-MCNC: 0.6 MG/DL (ref 0.1–1)
BILIRUB UR QL STRIP: NEGATIVE
BUN BLD-MCNC: 11 MG/DL (ref 5–21)
BUN/CREAT SERPL: 22 (ref 7–25)
CALCIUM SPEC-SCNC: 9.2 MG/DL (ref 8.4–10.4)
CHLORIDE SERPL-SCNC: 99 MMOL/L (ref 98–110)
CHOLEST SERPL-MCNC: 220 MG/DL (ref 130–200)
CLARITY UR: CLEAR
CO2 SERPL-SCNC: 26 MMOL/L (ref 24–31)
COLOR UR: ABNORMAL
CREAT BLD-MCNC: 0.5 MG/DL (ref 0.5–1.4)
DEPRECATED RDW RBC AUTO: 37.9 FL (ref 40–54)
EOSINOPHIL # BLD AUTO: 0.11 10*3/MM3 (ref 0–0.7)
EOSINOPHIL NFR BLD AUTO: 1.4 % (ref 0–4)
ERYTHROCYTE [DISTWIDTH] IN BLOOD BY AUTOMATED COUNT: 12.4 % (ref 12–15)
GFR SERPL CREATININE-BSD FRML MDRD: 138 ML/MIN/1.73
GLOBULIN UR ELPH-MCNC: 3.2 GM/DL
GLUCOSE BLD-MCNC: 168 MG/DL (ref 70–100)
GLUCOSE UR STRIP-MCNC: NEGATIVE MG/DL
HBA1C MFR BLD: 7.8 %
HCT VFR BLD AUTO: 39.3 % (ref 37–47)
HDLC SERPL-MCNC: 55 MG/DL
HGB BLD-MCNC: 13.3 G/DL (ref 12–16)
HGB UR QL STRIP.AUTO: ABNORMAL
HYALINE CASTS UR QL AUTO: ABNORMAL /LPF
IMM GRANULOCYTES # BLD AUTO: 0.02 10*3/MM3 (ref 0–0.03)
IMM GRANULOCYTES NFR BLD AUTO: 0.3 % (ref 0–5)
KETONES UR QL STRIP: NEGATIVE
LDLC/HDLC SERPL: 2.23 {RATIO}
LEUKOCYTE ESTERASE UR QL STRIP.AUTO: NEGATIVE
LYMPHOCYTES # BLD AUTO: 3.07 10*3/MM3 (ref 0.72–4.86)
LYMPHOCYTES NFR BLD AUTO: 38.8 % (ref 15–45)
MCH RBC QN AUTO: 28.4 PG (ref 28–32)
MCHC RBC AUTO-ENTMCNC: 33.8 G/DL (ref 33–36)
MCV RBC AUTO: 84 FL (ref 82–98)
MONOCYTES # BLD AUTO: 0.51 10*3/MM3 (ref 0.19–1.3)
MONOCYTES NFR BLD AUTO: 6.4 % (ref 4–12)
NEUTROPHILS # BLD AUTO: 4.17 10*3/MM3 (ref 1.87–8.4)
NEUTROPHILS NFR BLD AUTO: 52.6 % (ref 39–78)
NITRITE UR QL STRIP: NEGATIVE
NRBC BLD AUTO-RTO: 0 /100 WBC (ref 0–0)
PH UR STRIP.AUTO: 5.5 [PH] (ref 5–8)
PLATELET # BLD AUTO: 285 10*3/MM3 (ref 130–400)
PMV BLD AUTO: 11.5 FL (ref 6–12)
POTASSIUM BLD-SCNC: 4 MMOL/L (ref 3.5–5.3)
PROT SERPL-MCNC: 7.5 G/DL (ref 6.3–8.7)
PROT UR QL STRIP: ABNORMAL
RBC # BLD AUTO: 4.68 10*6/MM3 (ref 4.2–5.4)
RBC # UR: ABNORMAL /HPF
REF LAB TEST METHOD: ABNORMAL
SODIUM BLD-SCNC: 138 MMOL/L (ref 135–145)
SP GR UR STRIP: >=1.03 (ref 1–1.03)
SQUAMOUS #/AREA URNS HPF: ABNORMAL /HPF
TRIGL SERPL-MCNC: 211 MG/DL (ref 0–149)
TSH SERPL DL<=0.05 MIU/L-ACNC: 2.23 MIU/ML (ref 0.47–4.68)
UROBILINOGEN UR QL STRIP: ABNORMAL
WBC NRBC COR # BLD: 7.92 10*3/MM3 (ref 4.8–10.8)
WBC UR QL AUTO: ABNORMAL /HPF

## 2019-01-10 PROCEDURE — 85025 COMPLETE CBC W/AUTO DIFF WBC: CPT | Performed by: INTERNAL MEDICINE

## 2019-01-10 PROCEDURE — 84443 ASSAY THYROID STIM HORMONE: CPT | Performed by: INTERNAL MEDICINE

## 2019-01-10 PROCEDURE — 81001 URINALYSIS AUTO W/SCOPE: CPT | Performed by: INTERNAL MEDICINE

## 2019-01-10 PROCEDURE — 80053 COMPREHEN METABOLIC PANEL: CPT | Performed by: INTERNAL MEDICINE

## 2019-01-10 PROCEDURE — 80061 LIPID PANEL: CPT | Performed by: INTERNAL MEDICINE

## 2019-01-10 PROCEDURE — 82570 ASSAY OF URINE CREATININE: CPT | Performed by: INTERNAL MEDICINE

## 2019-01-10 PROCEDURE — 36415 COLL VENOUS BLD VENIPUNCTURE: CPT

## 2019-01-10 PROCEDURE — 83036 HEMOGLOBIN GLYCOSYLATED A1C: CPT | Performed by: INTERNAL MEDICINE

## 2019-01-10 PROCEDURE — 82043 UR ALBUMIN QUANTITATIVE: CPT | Performed by: INTERNAL MEDICINE

## 2019-01-11 DIAGNOSIS — E11.9 TYPE 2 DIABETES MELLITUS WITHOUT COMPLICATION, WITHOUT LONG-TERM CURRENT USE OF INSULIN (HCC): ICD-10-CM

## 2019-01-11 DIAGNOSIS — E66.01 MORBID OBESITY DUE TO EXCESS CALORIES (HCC): ICD-10-CM

## 2019-01-11 DIAGNOSIS — I10 ESSENTIAL HYPERTENSION: ICD-10-CM

## 2019-01-11 DIAGNOSIS — E78.2 MIXED HYPERLIPIDEMIA: ICD-10-CM

## 2019-01-11 LAB
ALBUMIN SERPL-MCNC: 4.3 G/DL
ALP BLD-CCNC: 57 U/L
ALT SERPL-CCNC: 18 U/L
ANION GAP SERPL CALCULATED.3IONS-SCNC: 1.3 MMOL/L
AST SERPL-CCNC: 20 U/L
AVERAGE GLUCOSE: NORMAL
BILIRUB SERPL-MCNC: 0.6 MG/DL (ref 0.1–1.4)
BUN BLDV-MCNC: 11 MG/DL
CALCIUM SERPL-MCNC: 9.2 MG/DL
CHLORIDE BLD-SCNC: 99 MMOL/L
CHOLESTEROL, TOTAL: 220 MG/DL
CHOLESTEROL/HDL RATIO: 2.23
CO2: 26 MMOL/L
CREAT 24H UR-MCNC: 272.1 MG/DL
CREAT SERPL-MCNC: 0.5 MG/DL
CREATININE, URINE: 272.1
GFR CALCULATED: 138
GLUCOSE BLD-MCNC: 168 MG/DL
HBA1C MFR BLD: 7.8 %
HDLC SERPL-MCNC: 55 MG/DL (ref 35–70)
LDL CHOLESTEROL CALCULATED: 143 MG/DL (ref 0–160)
MICROALBUMIN UR-MCNC: 64.2 UG/ML
MICROALBUMIN/CREAT 24H UR: 64.2 MG/G{CREAT}
MICROALBUMIN/CREAT UR-RTO: 23.6
MICROALBUMIN/CREAT UR: 23.6 MG/G CREAT (ref 0–30)
POTASSIUM SERPL-SCNC: 4 MMOL/L
SODIUM BLD-SCNC: 138 MMOL/L
TOTAL PROTEIN: 7.5
TRIGL SERPL-MCNC: 211 MG/DL
VLDLC SERPL CALC-MCNC: NORMAL MG/DL

## 2019-01-17 ENCOUNTER — OFFICE VISIT (OUTPATIENT)
Dept: INTERNAL MEDICINE | Age: 39
End: 2019-01-17
Payer: COMMERCIAL

## 2019-01-17 VITALS
HEART RATE: 102 BPM | DIASTOLIC BLOOD PRESSURE: 68 MMHG | OXYGEN SATURATION: 98 % | HEIGHT: 63 IN | SYSTOLIC BLOOD PRESSURE: 128 MMHG | WEIGHT: 240 LBS | BODY MASS INDEX: 42.52 KG/M2

## 2019-01-17 DIAGNOSIS — Z78.9 MEDICATION INTOLERANCE: ICD-10-CM

## 2019-01-17 DIAGNOSIS — E66.01 MORBID OBESITY DUE TO EXCESS CALORIES (HCC): ICD-10-CM

## 2019-01-17 DIAGNOSIS — E78.2 MIXED HYPERLIPIDEMIA: ICD-10-CM

## 2019-01-17 DIAGNOSIS — E11.9 TYPE 2 DIABETES MELLITUS WITHOUT COMPLICATION, WITHOUT LONG-TERM CURRENT USE OF INSULIN (HCC): ICD-10-CM

## 2019-01-17 DIAGNOSIS — Z00.00 ANNUAL PHYSICAL EXAM: Primary | ICD-10-CM

## 2019-01-17 DIAGNOSIS — F41.1 GENERALIZED ANXIETY DISORDER: ICD-10-CM

## 2019-01-17 DIAGNOSIS — I10 ESSENTIAL HYPERTENSION: ICD-10-CM

## 2019-01-17 PROCEDURE — 99395 PREV VISIT EST AGE 18-39: CPT | Performed by: INTERNAL MEDICINE

## 2019-01-17 RX ORDER — ROSUVASTATIN CALCIUM 40 MG/1
40 TABLET, COATED ORAL DAILY
Qty: 90 TABLET | Refills: 1 | Status: SHIPPED | OUTPATIENT
Start: 2019-01-17 | End: 2019-03-04 | Stop reason: SDUPTHER

## 2019-01-17 ASSESSMENT — ENCOUNTER SYMPTOMS
TROUBLE SWALLOWING: 0
DIARRHEA: 0
SORE THROAT: 0
CHEST TIGHTNESS: 0
VOICE CHANGE: 0
VOMITING: 0
CONSTIPATION: 0
BLOOD IN STOOL: 0
EYE PAIN: 0
COUGH: 0
NAUSEA: 0
WHEEZING: 0
SINUS PRESSURE: 0
EYE REDNESS: 0
ABDOMINAL PAIN: 0
COLOR CHANGE: 0

## 2019-01-21 ENCOUNTER — TELEPHONE (OUTPATIENT)
Dept: INTERNAL MEDICINE | Age: 39
End: 2019-01-21

## 2019-02-16 PROBLEM — Z00.00 ANNUAL PHYSICAL EXAM: Status: RESOLVED | Noted: 2017-09-19 | Resolved: 2019-02-16

## 2019-03-05 RX ORDER — ROSUVASTATIN CALCIUM 40 MG/1
40 TABLET, COATED ORAL DAILY
Qty: 90 TABLET | Refills: 1 | Status: SHIPPED | OUTPATIENT
Start: 2019-03-05 | End: 2019-08-23 | Stop reason: SDUPTHER

## 2019-04-08 ENCOUNTER — TELEPHONE (OUTPATIENT)
Dept: INTERNAL MEDICINE | Age: 39
End: 2019-04-08

## 2019-04-08 NOTE — TELEPHONE ENCOUNTER
Spoke with [facility], states pt's Rosuvastatin is on back order- is there anything else that can be called in?

## 2019-04-15 ENCOUNTER — TRANSCRIBE ORDERS (OUTPATIENT)
Dept: ADMINISTRATIVE | Facility: HOSPITAL | Age: 39
End: 2019-04-15

## 2019-04-15 ENCOUNTER — APPOINTMENT (OUTPATIENT)
Dept: LAB | Facility: HOSPITAL | Age: 39
End: 2019-04-15

## 2019-04-15 DIAGNOSIS — I10 ESSENTIAL HYPERTENSION: ICD-10-CM

## 2019-04-15 DIAGNOSIS — E66.01 MORBID OBESITY DUE TO EXCESS CALORIES (HCC): ICD-10-CM

## 2019-04-15 DIAGNOSIS — E11.9 TYPE 2 DIABETES MELLITUS WITHOUT COMPLICATION, UNSPECIFIED WHETHER LONG TERM INSULIN USE (HCC): ICD-10-CM

## 2019-04-15 DIAGNOSIS — E78.2 MIXED HYPERLIPIDEMIA: ICD-10-CM

## 2019-04-15 DIAGNOSIS — Z00.00 ANNUAL PHYSICAL EXAM: Primary | ICD-10-CM

## 2019-04-15 DIAGNOSIS — F41.1 GENERALIZED ANXIETY DISORDER: ICD-10-CM

## 2019-04-15 LAB
ALBUMIN SERPL-MCNC: 4.3 G/DL
ALBUMIN SERPL-MCNC: 4.3 G/DL (ref 3.5–5)
ALBUMIN/GLOB SERPL: 1.5 G/DL (ref 1.1–2.5)
ALP BLD-CCNC: 56 U/L
ALP SERPL-CCNC: 56 U/L (ref 24–120)
ALT SERPL W P-5'-P-CCNC: 16 U/L (ref 0–54)
ALT SERPL-CCNC: 16 U/L
ANION GAP SERPL CALCULATED.3IONS-SCNC: 1.5 MMOL/L
ANION GAP SERPL CALCULATED.3IONS-SCNC: 13 MMOL/L (ref 4–13)
ARTICHOKE IGE QN: 81 MG/DL (ref 0–99)
AST SERPL-CCNC: 20 U/L
AST SERPL-CCNC: 20 U/L (ref 7–45)
AVERAGE GLUCOSE: NORMAL
BILIRUB SERPL-MCNC: 0.4 MG/DL (ref 0.1–1)
BILIRUB SERPL-MCNC: 0.4 MG/DL (ref 0.1–1.4)
BUN BLD-MCNC: 10 MG/DL (ref 5–21)
BUN BLDV-MCNC: 10 MG/DL
BUN/CREAT SERPL: 19.2 (ref 7–25)
CALCIUM SERPL-MCNC: 9.1 MG/DL
CALCIUM SPEC-SCNC: 9.1 MG/DL (ref 8.4–10.4)
CHLORIDE BLD-SCNC: 103 MMOL/L
CHLORIDE SERPL-SCNC: 103 MMOL/L (ref 98–110)
CHOLEST SERPL-MCNC: 149 MG/DL (ref 130–200)
CHOLESTEROL, TOTAL: 149 MG/DL
CHOLESTEROL/HDL RATIO: 1.29
CO2 SERPL-SCNC: 23 MMOL/L (ref 24–31)
CO2: 23 MMOL/L
CREAT BLD-MCNC: 0.52 MG/DL (ref 0.5–1.4)
CREAT SERPL-MCNC: 0.52 MG/DL
GFR CALCULATED: 132
GFR SERPL CREATININE-BSD FRML MDRD: 132 ML/MIN/1.73
GLOBULIN UR ELPH-MCNC: 2.8 GM/DL
GLUCOSE BLD-MCNC: 144 MG/DL
GLUCOSE BLD-MCNC: 144 MG/DL (ref 70–100)
HBA1C MFR BLD: 6.8 %
HBA1C MFR BLD: 6.8 %
HDLC SERPL-MCNC: 49 MG/DL
HDLC SERPL-MCNC: 49 MG/DL (ref 35–70)
LDL CHOLESTEROL CALCULATED: 81 MG/DL (ref 0–160)
LDLC/HDLC SERPL: 1.29 {RATIO}
POTASSIUM BLD-SCNC: 4.2 MMOL/L (ref 3.5–5.3)
POTASSIUM SERPL-SCNC: 4.2 MMOL/L
PROT SERPL-MCNC: 7.1 G/DL (ref 6.3–8.7)
SODIUM BLD-SCNC: 139 MMOL/L
SODIUM BLD-SCNC: 139 MMOL/L (ref 135–145)
TOTAL PROTEIN: 7.1
TRIGL SERPL-MCNC: 183 MG/DL
TRIGL SERPL-MCNC: 183 MG/DL (ref 0–149)
VLDLC SERPL CALC-MCNC: NORMAL MG/DL

## 2019-04-15 PROCEDURE — 36415 COLL VENOUS BLD VENIPUNCTURE: CPT | Performed by: INTERNAL MEDICINE

## 2019-04-15 PROCEDURE — 80053 COMPREHEN METABOLIC PANEL: CPT | Performed by: INTERNAL MEDICINE

## 2019-04-15 PROCEDURE — 80061 LIPID PANEL: CPT | Performed by: INTERNAL MEDICINE

## 2019-04-15 PROCEDURE — 83036 HEMOGLOBIN GLYCOSYLATED A1C: CPT | Performed by: INTERNAL MEDICINE

## 2019-04-15 RX ORDER — OLMESARTAN MEDOXOMIL AND HYDROCHLOROTHIAZIDE 40/12.5 40; 12.5 MG/1; MG/1
1 TABLET ORAL DAILY
Qty: 30 TABLET | Refills: 5 | Status: SHIPPED | OUTPATIENT
Start: 2019-04-15 | End: 2019-08-23 | Stop reason: SDUPTHER

## 2019-04-22 ENCOUNTER — OFFICE VISIT (OUTPATIENT)
Dept: INTERNAL MEDICINE | Age: 39
End: 2019-04-22
Payer: COMMERCIAL

## 2019-04-22 VITALS
HEIGHT: 63 IN | OXYGEN SATURATION: 99 % | HEART RATE: 96 BPM | DIASTOLIC BLOOD PRESSURE: 78 MMHG | SYSTOLIC BLOOD PRESSURE: 108 MMHG | WEIGHT: 238 LBS | RESPIRATION RATE: 18 BRPM | BODY MASS INDEX: 42.17 KG/M2

## 2019-04-22 DIAGNOSIS — E66.01 MORBID OBESITY DUE TO EXCESS CALORIES (HCC): ICD-10-CM

## 2019-04-22 DIAGNOSIS — I10 ESSENTIAL HYPERTENSION: ICD-10-CM

## 2019-04-22 DIAGNOSIS — E11.9 TYPE 2 DIABETES MELLITUS WITHOUT COMPLICATION, WITHOUT LONG-TERM CURRENT USE OF INSULIN (HCC): ICD-10-CM

## 2019-04-22 DIAGNOSIS — K76.0 FATTY LIVER: ICD-10-CM

## 2019-04-22 DIAGNOSIS — E78.2 MIXED HYPERLIPIDEMIA: Primary | ICD-10-CM

## 2019-04-22 PROCEDURE — 99214 OFFICE O/P EST MOD 30 MIN: CPT | Performed by: INTERNAL MEDICINE

## 2019-04-22 RX ORDER — ESCITALOPRAM OXALATE 10 MG/1
10 TABLET ORAL DAILY
Qty: 30 TABLET | Refills: 3 | Status: SHIPPED | OUTPATIENT
Start: 2019-04-22 | End: 2019-08-23 | Stop reason: SDUPTHER

## 2019-04-22 ASSESSMENT — ENCOUNTER SYMPTOMS
SORE THROAT: 0
WHEEZING: 0
COUGH: 0
ABDOMINAL PAIN: 0
CONSTIPATION: 0
CHEST TIGHTNESS: 0

## 2019-04-22 ASSESSMENT — PATIENT HEALTH QUESTIONNAIRE - PHQ9
SUM OF ALL RESPONSES TO PHQ9 QUESTIONS 1 & 2: 0
2. FEELING DOWN, DEPRESSED OR HOPELESS: 0
SUM OF ALL RESPONSES TO PHQ QUESTIONS 1-9: 0
SUM OF ALL RESPONSES TO PHQ QUESTIONS 1-9: 0
1. LITTLE INTEREST OR PLEASURE IN DOING THINGS: 0

## 2019-04-22 NOTE — PROGRESS NOTES
nebivolol (BYSTOLIC) 10 MG tablet Take 1 tablet by mouth every morning 30 tablet 5    Insulin Pen Needle 31G X 5 MM MISC 1 each by Does not apply route daily Dx: E11.9 100 each 3     No current facility-administered medications for this visit. Allergies   Allergen Reactions    Penicillins      Social History     Tobacco Use    Smoking status: Former Smoker     Packs/day: 0.50     Years: 7.00     Pack years: 3.50     Types: Cigarettes     Last attempt to quit:      Years since quittin.3    Smokeless tobacco: Never Used   Substance Use Topics    Alcohol use: No      Family History   Problem Relation Age of Onset    Diabetes Mother     High Blood Pressure Mother     Colon Cancer Mother 61    Diabetes Father        Review of Systems   Constitutional: Positive for fatigue. Negative for chills and fever. HENT: Negative for congestion, ear pain, nosebleeds, postnasal drip and sore throat. Respiratory: Negative for cough, chest tightness and wheezing. Cardiovascular: Negative for chest pain, palpitations and leg swelling. Gastrointestinal: Negative for abdominal pain and constipation. Genitourinary: Negative for dysuria and urgency. Musculoskeletal: Negative. Negative for arthralgias. Skin: Negative for rash. Neurological: Negative for dizziness and headaches. Psychiatric/Behavioral: Negative. Vitals:    19 1238   BP: 108/78   Site: Left Upper Arm   Position: Sitting   Cuff Size: Large Adult   Pulse: 96   Resp: 18   SpO2: 99%   Weight: 238 lb (108 kg)   Height: 5' 3\" (1.6 m)     Body mass index is 42.16 kg/m². Physical Exam   Constitutional: She is oriented to person, place, and time. She appears well-developed and well-nourished. HENT:   Right Ear: External ear normal.   Left Ear: External ear normal.   Mouth/Throat: Oropharynx is clear and moist. No oropharyngeal exudate. Eyes: Pupils are equal, round, and reactive to light.  Conjunctivae are normal.   Neck: the Pristiq is making her more anxious  Stop Prisitq  Start lexapro 10  (  she felt better when was taking Lexapro)     Type 2 diabetes mellitus without complication, without long-term current use of insulin (HCC)  A1c is higher 6.8 (7.8) (6.5)( 6.1) (7.9)( 7.2)  Off victoza since insurnace stopped covering  Plan as follows  # 1 cont trulicity 1.5 weekly  # 2 strict diet and wt loss  # 3 exercise  # 4 repeat 3 mo     Fatty liver- lft' s now normal/ strict diet and weight loss recommended     Essential hypertension-  she will continue current benicar  dose and Bystolic 10 mg daily  's blood pressure is borderline low today patient will be checking this at home and will notify me if it stays low at home             Orders Placed This Encounter   Procedures    Comprehensive Metabolic Panel    Hemoglobin A1C    Lipid Panel     New Prescriptions    ESCITALOPRAM (LEXAPRO) 10 MG TABLET    Take 1 tablet by mouth daily         Return in about 4 months (around 8/22/2019) for Medication check. There are no Patient Instructions on file for this visit. EMR Dragon/transcription disclaimer:Significant part of this  encounter note is electronic transcription/translationof spoken language to printed text. The electronic translation of spoken language may be erroneous, or at times, nonsensical words or phrases may be inadvertently transcribed.  Although I have reviewed the note for sucherrors, some may still exist.

## 2019-07-05 RX ORDER — NEBIVOLOL HYDROCHLORIDE 10 MG/1
TABLET ORAL
Qty: 30 TABLET | Refills: 5 | Status: SHIPPED | OUTPATIENT
Start: 2019-07-05 | End: 2019-08-23 | Stop reason: SDUPTHER

## 2019-08-20 ENCOUNTER — APPOINTMENT (OUTPATIENT)
Dept: LAB | Facility: HOSPITAL | Age: 39
End: 2019-08-20

## 2019-08-20 ENCOUNTER — TRANSCRIBE ORDERS (OUTPATIENT)
Dept: ADMINISTRATIVE | Facility: HOSPITAL | Age: 39
End: 2019-08-20

## 2019-08-20 DIAGNOSIS — E66.01 MORBID OBESITY DUE TO EXCESS CALORIES (HCC): ICD-10-CM

## 2019-08-20 DIAGNOSIS — I10 ESSENTIAL HYPERTENSION: ICD-10-CM

## 2019-08-20 DIAGNOSIS — E78.2 MIXED HYPERLIPIDEMIA: Primary | ICD-10-CM

## 2019-08-20 DIAGNOSIS — K76.0 FATTY LIVER: ICD-10-CM

## 2019-08-20 DIAGNOSIS — E11.9 TYPE 2 DIABETES MELLITUS WITHOUT COMPLICATION, WITHOUT LONG-TERM CURRENT USE OF INSULIN (HCC): ICD-10-CM

## 2019-08-20 LAB
ALBUMIN SERPL-MCNC: 4.3 G/DL
ALBUMIN SERPL-MCNC: 4.3 G/DL (ref 3.5–5)
ALBUMIN/GLOB SERPL: 1.5 G/DL (ref 1.1–2.5)
ALP BLD-CCNC: 48 U/L
ALP SERPL-CCNC: 64 U/L (ref 24–120)
ALT SERPL W P-5'-P-CCNC: 54 U/L (ref 0–54)
ALT SERPL-CCNC: 54 U/L
ANION GAP SERPL CALCULATED.3IONS-SCNC: 10 MMOL/L
ANION GAP SERPL CALCULATED.3IONS-SCNC: 10 MMOL/L (ref 4–13)
ARTICHOKE IGE QN: 86 MG/DL (ref 0–99)
AST SERPL-CCNC: 48 U/L
AST SERPL-CCNC: 48 U/L (ref 7–45)
AVERAGE GLUCOSE: NORMAL
BILIRUB SERPL-MCNC: 0.7 MG/DL (ref 0.1–1)
BILIRUB SERPL-MCNC: 0.7 MG/DL (ref 0.1–1.4)
BUN BLD-MCNC: 11 MG/DL (ref 5–21)
BUN BLDV-MCNC: 11 MG/DL
BUN/CREAT SERPL: 19 (ref 7–25)
CALCIUM SERPL-MCNC: 9.3 MG/DL
CALCIUM SPEC-SCNC: 9.3 MG/DL (ref 8.4–10.4)
CHLORIDE BLD-SCNC: 103 MMOL/L
CHLORIDE SERPL-SCNC: 103 MMOL/L (ref 98–110)
CHOLEST SERPL-MCNC: 143 MG/DL (ref 130–200)
CHOLESTEROL, TOTAL: 143 MG/DL
CHOLESTEROL/HDL RATIO: 1.23
CO2 SERPL-SCNC: 25 MMOL/L (ref 24–31)
CO2: 25 MMOL/L
CREAT BLD-MCNC: 0.58 MG/DL (ref 0.5–1.4)
CREAT SERPL-MCNC: 0.58 MG/DL
GFR CALCULATED: 116
GFR SERPL CREATININE-BSD FRML MDRD: 116 ML/MIN/1.73
GLOBULIN UR ELPH-MCNC: 2.9 GM/DL
GLUCOSE BLD-MCNC: 184 MG/DL
GLUCOSE BLD-MCNC: 184 MG/DL (ref 70–100)
HBA1C MFR BLD: 8.1 %
HBA1C MFR BLD: 8.1 % (ref 4.8–5.9)
HDLC SERPL-MCNC: 48 MG/DL
HDLC SERPL-MCNC: 48 MG/DL (ref 35–70)
LDL CHOLESTEROL CALCULATED: 86 MG/DL (ref 0–160)
LDLC/HDLC SERPL: 1.23 {RATIO}
POTASSIUM BLD-SCNC: 4.4 MMOL/L (ref 3.5–5.3)
POTASSIUM SERPL-SCNC: 4.4 MMOL/L
PROT SERPL-MCNC: 7.2 G/DL (ref 6.3–8.7)
SODIUM BLD-SCNC: 138 MMOL/L
SODIUM BLD-SCNC: 138 MMOL/L (ref 135–145)
TOTAL PROTEIN: 7.2
TRIGL SERPL-MCNC: 179 MG/DL
TRIGL SERPL-MCNC: 179 MG/DL (ref 0–149)
VLDLC SERPL CALC-MCNC: NORMAL MG/DL

## 2019-08-20 PROCEDURE — 80053 COMPREHEN METABOLIC PANEL: CPT | Performed by: INTERNAL MEDICINE

## 2019-08-20 PROCEDURE — 36415 COLL VENOUS BLD VENIPUNCTURE: CPT | Performed by: INTERNAL MEDICINE

## 2019-08-20 PROCEDURE — 83036 HEMOGLOBIN GLYCOSYLATED A1C: CPT | Performed by: INTERNAL MEDICINE

## 2019-08-20 PROCEDURE — 80061 LIPID PANEL: CPT | Performed by: INTERNAL MEDICINE

## 2019-08-23 ENCOUNTER — OFFICE VISIT (OUTPATIENT)
Dept: INTERNAL MEDICINE | Age: 39
End: 2019-08-23
Payer: COMMERCIAL

## 2019-08-23 VITALS
RESPIRATION RATE: 20 BRPM | WEIGHT: 247 LBS | HEIGHT: 63 IN | DIASTOLIC BLOOD PRESSURE: 82 MMHG | SYSTOLIC BLOOD PRESSURE: 128 MMHG | OXYGEN SATURATION: 95 % | BODY MASS INDEX: 43.77 KG/M2 | HEART RATE: 96 BPM

## 2019-08-23 DIAGNOSIS — K76.0 FATTY LIVER: Primary | ICD-10-CM

## 2019-08-23 DIAGNOSIS — I10 ESSENTIAL HYPERTENSION: ICD-10-CM

## 2019-08-23 DIAGNOSIS — E11.9 TYPE 2 DIABETES MELLITUS WITHOUT COMPLICATION, WITHOUT LONG-TERM CURRENT USE OF INSULIN (HCC): ICD-10-CM

## 2019-08-23 DIAGNOSIS — E78.2 MIXED HYPERLIPIDEMIA: ICD-10-CM

## 2019-08-23 DIAGNOSIS — F41.1 GENERALIZED ANXIETY DISORDER: ICD-10-CM

## 2019-08-23 DIAGNOSIS — E66.01 MORBID OBESITY DUE TO EXCESS CALORIES (HCC): ICD-10-CM

## 2019-08-23 PROCEDURE — 99214 OFFICE O/P EST MOD 30 MIN: CPT | Performed by: INTERNAL MEDICINE

## 2019-08-23 RX ORDER — NEBIVOLOL 10 MG/1
TABLET ORAL
Qty: 90 TABLET | Refills: 1 | Status: SHIPPED | OUTPATIENT
Start: 2019-08-23 | End: 2019-11-22 | Stop reason: SDUPTHER

## 2019-08-23 RX ORDER — ESCITALOPRAM OXALATE 10 MG/1
10 TABLET ORAL DAILY
Qty: 90 TABLET | Refills: 1 | Status: SHIPPED | OUTPATIENT
Start: 2019-08-23 | End: 2019-11-22 | Stop reason: SDUPTHER

## 2019-08-23 RX ORDER — OLMESARTAN MEDOXOMIL AND HYDROCHLOROTHIAZIDE 40/12.5 40; 12.5 MG/1; MG/1
1 TABLET ORAL DAILY
Qty: 90 TABLET | Refills: 1 | Status: SHIPPED | OUTPATIENT
Start: 2019-08-23 | End: 2019-11-22 | Stop reason: SDUPTHER

## 2019-08-23 RX ORDER — ROSUVASTATIN CALCIUM 40 MG/1
40 TABLET, COATED ORAL DAILY
Qty: 90 TABLET | Refills: 1 | Status: SHIPPED | OUTPATIENT
Start: 2019-08-23 | End: 2019-11-22 | Stop reason: SDUPTHER

## 2019-08-23 ASSESSMENT — ENCOUNTER SYMPTOMS
ABDOMINAL PAIN: 0
COUGH: 0
WHEEZING: 0
CONSTIPATION: 0
CHEST TIGHTNESS: 0
SORE THROAT: 0

## 2019-08-23 NOTE — PROGRESS NOTES
Chief Complaint   Patient presents with    Diabetes     History of presenting illness:  Dagmar Kraus is a37 y.o. female who presents today for follow up on her chronic medical conditions as noted below. Mixed hyperlipidemia-she has been taking her crestor 40 daily     Generalized anxiety disorder- would like to restart Lexapro since Pristiq is making her more anxious     Type 2 diabetes mellitus without complication, without long-term current use of insulin ([address]- states that she has been on  stricter  diet      Essential hypertension-Patient reports her Bp has been well controlled ( systolic below 125; diastolic below 90) at home when checked with home/ store equipment. No side effects related to blood pressure medications were reported by patient       Patient Active Problem List    Diagnosis Date Noted    Medication intolerance 01/17/2019     Overview Note:     Intolerance to metformin      Generalized anxiety disorder 09/19/2017    Essential hypertension 09/18/2017    Type 2 diabetes mellitus without complication, without long-term current use of insulin ([address] 09/18/2017    Fatty liver 09/18/2017    Morbid obesity due to excess calories ([address] 09/18/2017    Mixed hyperlipidemia 09/18/2017    Chronic midline low back pain without sciatica 09/18/2017     No past medical history on file.    Past Surgical History:   Procedure Laterality Date    CHOLECYSTECTOMY       Current Outpatient Medications   Medication Sig Dispense Refill    rosuvastatin (CRESTOR) 40 MG tablet Take 1 tablet by mouth daily 90 tablet 1    olmesartan-hydrochlorothiazide (BENICAR HCT) 40-12.5 MG per tablet Take 1 tablet by mouth daily 90 tablet 1    escitalopram (LEXAPRO) 10 MG tablet Take 1 tablet by mouth daily 90 tablet 1    Dulaglutide 1.5 MG/0.5ML SOPN Inject 1.5 mg into the skin once a week 4 pen 3    nebivolol (BYSTOLIC) 10 MG tablet Take 1 tablet by mouth every morning 90 tablet 1    empagliflozin (JARDIANCE) 25 MG tablet Take 1 tablet by mouth daily 30 tablet 2    Levonorgest-Eth Estrad 91-Day (SEASONIQUE PO) Take by mouth      Insulin Pen Needle 31G X 5 MM MISC 1 each by Does not apply route daily Dx: E11.9 100 each 3     No current facility-administered medications for this visit. Allergies   Allergen Reactions    Penicillins      Social History     Tobacco Use    Smoking status: Former Smoker     Packs/day: 0.50     Years: 7.00     Pack years: 3.50     Types: Cigarettes     Last attempt to quit:      Years since quittin.6    Smokeless tobacco: Never Used   Substance Use Topics    Alcohol use: No      Family History   Problem Relation Age of Onset    Diabetes Mother     High Blood Pressure Mother     Colon Cancer Mother 61    Diabetes Father        Review of Systems   Constitutional: Negative for chills, fatigue and fever. HENT: Negative for congestion, ear pain, nosebleeds, postnasal drip and sore throat. Respiratory: Negative for cough, chest tightness and wheezing. Cardiovascular: Negative for chest pain, palpitations and leg swelling. Gastrointestinal: Negative for abdominal pain and constipation. Genitourinary: Negative for dysuria and urgency. Musculoskeletal: Negative. Negative for arthralgias. Skin: Negative for rash. Neurological: Negative for dizziness and headaches. Psychiatric/Behavioral: Negative. Vitals:    19 1353   BP: 128/82   Pulse: 96   Resp: 20   SpO2: 95%   Weight: 247 lb (112 kg)   Height: 5' 3\" (1.6 m)     Body mass index is 43.75 kg/m². Physical Exam   Constitutional: She is oriented to person, place, and time. She appears well-developed and well-nourished. HENT:   Right Ear: External ear normal.   Left Ear: External ear normal.   Mouth/Throat: Oropharynx is clear and moist. No oropharyngeal exudate. Eyes: Pupils are equal, round, and reactive to light. Conjunctivae are normal.   Neck: Neck supple. No JVD present. No thyromegaly present.

## 2019-09-02 ENCOUNTER — PATIENT MESSAGE (OUTPATIENT)
Dept: INTERNAL MEDICINE | Age: 39
End: 2019-09-02

## 2019-09-03 RX ORDER — FLUCONAZOLE 150 MG/1
150 TABLET ORAL DAILY
Qty: 3 TABLET | Refills: 0 | Status: SHIPPED | OUTPATIENT
Start: 2019-09-03 | End: 2019-09-06

## 2019-09-03 RX ORDER — FLUCONAZOLE 150 MG/1
150 TABLET ORAL DAILY
Qty: 3 TABLET | Refills: 0 | Status: SHIPPED | OUTPATIENT
Start: 2019-09-03 | End: 2019-09-03 | Stop reason: SDUPTHER

## 2019-09-03 NOTE — TELEPHONE ENCOUNTER
From: Bib Casillas  To: Kendall Cox MD  Sent: 9/2/2019 6:22 PM CDT  Subject: Visit Follow-Up Question    I think that I'm going to need the antibiotics for UTI . I have been experiencing some urethral inflammation, itching, and slight discharge.

## 2019-09-24 ENCOUNTER — PATIENT MESSAGE (OUTPATIENT)
Dept: INTERNAL MEDICINE | Age: 39
End: 2019-09-24

## 2019-09-24 RX ORDER — FLUCONAZOLE 100 MG/1
100 TABLET ORAL
Qty: 4 TABLET | Refills: 3 | Status: SHIPPED | OUTPATIENT
Start: 2019-09-24 | End: 2019-11-22

## 2019-09-24 RX ORDER — FLUCONAZOLE 150 MG/1
150 TABLET ORAL DAILY
Qty: 3 TABLET | Refills: 0 | Status: SHIPPED | OUTPATIENT
Start: 2019-09-24 | End: 2019-09-27

## 2019-11-14 ENCOUNTER — TRANSCRIBE ORDERS (OUTPATIENT)
Dept: ADMINISTRATIVE | Facility: HOSPITAL | Age: 39
End: 2019-11-14

## 2019-11-14 ENCOUNTER — LAB (OUTPATIENT)
Dept: LAB | Facility: HOSPITAL | Age: 39
End: 2019-11-14

## 2019-11-14 DIAGNOSIS — E11.9 DIABETES MELLITUS WITHOUT COMPLICATION (HCC): Primary | ICD-10-CM

## 2019-11-14 DIAGNOSIS — K76.0 FATTY LIVER: ICD-10-CM

## 2019-11-14 DIAGNOSIS — E11.9 DIABETES MELLITUS WITHOUT COMPLICATION (HCC): ICD-10-CM

## 2019-11-14 LAB
ALT SERPL W P-5'-P-CCNC: 36 U/L (ref 1–33)
ALT SERPL-CCNC: 36 U/L
AST SERPL-CCNC: 33 U/L
AST SERPL-CCNC: 33 U/L (ref 1–32)
AVERAGE GLUCOSE: ABNORMAL
HBA1C MFR BLD: 9.6 %
HBA1C MFR BLD: 9.6 % (ref 4.8–5.6)

## 2019-11-14 PROCEDURE — 36415 COLL VENOUS BLD VENIPUNCTURE: CPT

## 2019-11-14 PROCEDURE — 84450 TRANSFERASE (AST) (SGOT): CPT | Performed by: INTERNAL MEDICINE

## 2019-11-14 PROCEDURE — 83036 HEMOGLOBIN GLYCOSYLATED A1C: CPT | Performed by: INTERNAL MEDICINE

## 2019-11-14 PROCEDURE — 84460 ALANINE AMINO (ALT) (SGPT): CPT | Performed by: INTERNAL MEDICINE

## 2019-11-15 DIAGNOSIS — K76.0 FATTY LIVER: ICD-10-CM

## 2019-11-15 DIAGNOSIS — E11.9 TYPE 2 DIABETES MELLITUS WITHOUT COMPLICATION, WITHOUT LONG-TERM CURRENT USE OF INSULIN (HCC): ICD-10-CM

## 2019-11-22 ENCOUNTER — OFFICE VISIT (OUTPATIENT)
Dept: INTERNAL MEDICINE | Age: 39
End: 2019-11-22
Payer: COMMERCIAL

## 2019-11-22 ENCOUNTER — TELEPHONE (OUTPATIENT)
Dept: INTERNAL MEDICINE | Age: 39
End: 2019-11-22

## 2019-11-22 VITALS
HEART RATE: 77 BPM | DIASTOLIC BLOOD PRESSURE: 82 MMHG | BODY MASS INDEX: 42.88 KG/M2 | WEIGHT: 242 LBS | OXYGEN SATURATION: 96 % | SYSTOLIC BLOOD PRESSURE: 110 MMHG | HEIGHT: 63 IN | RESPIRATION RATE: 20 BRPM

## 2019-11-22 DIAGNOSIS — E78.2 MIXED HYPERLIPIDEMIA: ICD-10-CM

## 2019-11-22 DIAGNOSIS — E66.01 MORBID OBESITY DUE TO EXCESS CALORIES (HCC): ICD-10-CM

## 2019-11-22 DIAGNOSIS — R73.9 HYPERGLYCEMIA: ICD-10-CM

## 2019-11-22 DIAGNOSIS — F41.1 GENERALIZED ANXIETY DISORDER: ICD-10-CM

## 2019-11-22 DIAGNOSIS — E11.9 TYPE 2 DIABETES MELLITUS WITHOUT COMPLICATION, WITHOUT LONG-TERM CURRENT USE OF INSULIN (HCC): ICD-10-CM

## 2019-11-22 DIAGNOSIS — I10 ESSENTIAL HYPERTENSION: Primary | ICD-10-CM

## 2019-11-22 PROCEDURE — 99214 OFFICE O/P EST MOD 30 MIN: CPT | Performed by: INTERNAL MEDICINE

## 2019-11-22 RX ORDER — ESCITALOPRAM OXALATE 10 MG/1
10 TABLET ORAL DAILY
Qty: 90 TABLET | Refills: 1 | Status: SHIPPED
Start: 2019-11-22 | End: 2020-08-19 | Stop reason: CLARIF

## 2019-11-22 RX ORDER — ROSUVASTATIN CALCIUM 40 MG/1
40 TABLET, COATED ORAL DAILY
Qty: 90 TABLET | Refills: 1 | Status: SHIPPED | OUTPATIENT
Start: 2019-11-22 | End: 2020-06-18 | Stop reason: SDUPTHER

## 2019-11-22 RX ORDER — NEBIVOLOL 10 MG/1
TABLET ORAL
Qty: 90 TABLET | Refills: 1 | Status: SHIPPED | OUTPATIENT
Start: 2019-11-22 | End: 2020-01-06

## 2019-11-22 RX ORDER — OLMESARTAN MEDOXOMIL AND HYDROCHLOROTHIAZIDE 40/12.5 40; 12.5 MG/1; MG/1
1 TABLET ORAL DAILY
Qty: 90 TABLET | Refills: 1 | Status: SHIPPED | OUTPATIENT
Start: 2019-11-22 | End: 2020-06-18 | Stop reason: SDUPTHER

## 2019-11-22 ASSESSMENT — ENCOUNTER SYMPTOMS
BACK PAIN: 1
COUGH: 0
WHEEZING: 0
CHEST TIGHTNESS: 0
CONSTIPATION: 0
ABDOMINAL PAIN: 0
SORE THROAT: 0

## 2019-11-25 ENCOUNTER — TELEPHONE (OUTPATIENT)
Dept: INTERNAL MEDICINE | Age: 39
End: 2019-11-25

## 2020-01-06 RX ORDER — NEBIVOLOL 10 MG/1
TABLET ORAL
Qty: 30 TABLET | Refills: 5 | Status: SHIPPED | OUTPATIENT
Start: 2020-01-06 | End: 2020-07-14

## 2020-01-06 NOTE — TELEPHONE ENCOUNTER
Cameron called requesting a refill of the below medication which has been pended for you:     Requested Prescriptions     Pending Prescriptions Disp Refills    Insulin Glargine, 1 Unit Dial, (TOUJEO SOLOSTAR) 300 UNIT/ML SOPN 2 pen 0     Sig: Inject 50 Units into the skin nightly    Insulin Pen Needle 31G X 5 MM MISC 100 each 3     Si each by Does not apply route daily Dx: E11.9       Last Appointment Date: 2019  Next Appointment Date: 2020    Allergies   Allergen Reactions    Penicillins

## 2020-01-06 NOTE — TELEPHONE ENCOUNTER
Ashish Middleton called requesting a refill of the below medication which has been pended for you:     Requested Prescriptions     Pending Prescriptions Disp Refills    nebivolol (BYSTOLIC) 10 MG tablet [Pharmacy Med Name: Nebivolol HCl 10 MG Oral Tablet] 30 tablet 5     Sig: Take 1 tablet by mouth every morning       Last Appointment Date: 11/22/2019  Next Appointment Date: 2/18/2020    Allergies   Allergen Reactions    Penicillins

## 2020-02-11 ENCOUNTER — TRANSCRIBE ORDERS (OUTPATIENT)
Dept: ADMINISTRATIVE | Facility: HOSPITAL | Age: 40
End: 2020-02-11

## 2020-02-11 ENCOUNTER — APPOINTMENT (OUTPATIENT)
Dept: LAB | Facility: HOSPITAL | Age: 40
End: 2020-02-11

## 2020-02-11 DIAGNOSIS — E11.9 TYPE 2 DIABETES MELLITUS WITHOUT COMPLICATION, WITHOUT LONG-TERM CURRENT USE OF INSULIN (HCC): Primary | ICD-10-CM

## 2020-02-11 DIAGNOSIS — E78.2 MIXED HYPERLIPIDEMIA: ICD-10-CM

## 2020-02-11 DIAGNOSIS — E66.01 MORBID OBESITY DUE TO EXCESS CALORIES (HCC): ICD-10-CM

## 2020-02-11 DIAGNOSIS — F41.1 GENERALIZED ANXIETY DISORDER: ICD-10-CM

## 2020-02-11 DIAGNOSIS — I10 ESSENTIAL HYPERTENSION: ICD-10-CM

## 2020-02-11 LAB
25(OH)D3 SERPL-MCNC: 26.6 NG/ML (ref 30–100)
ALBUMIN SERPL-MCNC: 4.3 G/DL
ALBUMIN SERPL-MCNC: 4.3 G/DL (ref 3.5–5.2)
ALBUMIN UR-MCNC: 2.9 MG/DL
ALBUMIN/GLOB SERPL: 1.7 G/DL
ALP BLD-CCNC: 66 U/L
ALP SERPL-CCNC: 66 U/L (ref 39–117)
ALT SERPL W P-5'-P-CCNC: 32 U/L (ref 1–33)
ALT SERPL-CCNC: 32 U/L
ANION GAP SERPL CALCULATED.3IONS-SCNC: 12.8 MMOL/L
ANION GAP SERPL CALCULATED.3IONS-SCNC: 12.8 MMOL/L (ref 5–15)
AST SERPL-CCNC: 29 U/L
AST SERPL-CCNC: 29 U/L (ref 1–32)
AVERAGE GLUCOSE: NORMAL
BASOPHILS # BLD AUTO: 0.04 10*3/MM3 (ref 0–0.2)
BASOPHILS ABSOLUTE: 0.04 /ΜL
BASOPHILS NFR BLD AUTO: 0.5 % (ref 0–1.5)
BASOPHILS RELATIVE PERCENT: 0.5 %
BILIRUB SERPL-MCNC: 0.3 MG/DL (ref 0.1–1.4)
BILIRUB SERPL-MCNC: 0.3 MG/DL (ref 0.2–1.2)
BILIRUB UR QL STRIP: NEGATIVE
BUN BLD-MCNC: 9 MG/DL (ref 6–20)
BUN BLDV-MCNC: 9 MG/DL
BUN/CREAT SERPL: 14.8 (ref 7–25)
CALCIUM SERPL-MCNC: 9.3 MG/DL
CALCIUM SPEC-SCNC: 9.3 MG/DL (ref 8.6–10.5)
CHLORIDE BLD-SCNC: 101 MMOL/L
CHLORIDE SERPL-SCNC: 101 MMOL/L (ref 98–107)
CHOLEST SERPL-MCNC: 149 MG/DL (ref 0–200)
CHOLESTEROL, TOTAL: 149 MG/DL
CHOLESTEROL/HDL RATIO: 1.28
CLARITY UR: CLEAR
CO2 SERPL-SCNC: 25.2 MMOL/L (ref 22–29)
CO2: 25.2 MMOL/L
COLOR UR: ABNORMAL
CREAT BLD-MCNC: 0.61 MG/DL (ref 0.57–1)
CREAT SERPL-MCNC: 0.61 MG/DL
CREAT UR-MCNC: 329 MG/DL
CREATININE, URINE: 329
DEPRECATED RDW RBC AUTO: 39.4 FL (ref 37–54)
EOSINOPHIL # BLD AUTO: 0.25 10*3/MM3 (ref 0–0.4)
EOSINOPHIL NFR BLD AUTO: 3 % (ref 0.3–6.2)
EOSINOPHILS ABSOLUTE: 0.25 /ΜL
EOSINOPHILS RELATIVE PERCENT: 3 %
ERYTHROCYTE [DISTWIDTH] IN BLOOD BY AUTOMATED COUNT: 12.9 % (ref 12.3–15.4)
GFR CALCULATED: 109
GFR SERPL CREATININE-BSD FRML MDRD: 109 ML/MIN/1.73
GLOBULIN UR ELPH-MCNC: 2.6 GM/DL
GLUCOSE BLD-MCNC: 182 MG/DL
GLUCOSE BLD-MCNC: 182 MG/DL (ref 65–99)
GLUCOSE UR STRIP-MCNC: NEGATIVE MG/DL
HBA1C MFR BLD: 9.15 %
HBA1C MFR BLD: 9.15 % (ref 4.8–5.6)
HCT VFR BLD AUTO: 38 % (ref 34–46.6)
HCT VFR BLD CALC: 38 % (ref 36–46)
HDLC SERPL-MCNC: 49 MG/DL (ref 35–70)
HDLC SERPL-MCNC: 49 MG/DL (ref 40–60)
HEMOGLOBIN: 13.2 G/DL (ref 12–16)
HGB BLD-MCNC: 13.2 G/DL (ref 12–15.9)
HGB UR QL STRIP.AUTO: NEGATIVE
IMM GRANULOCYTES # BLD AUTO: 0.03 10*3/MM3 (ref 0–0.05)
IMM GRANULOCYTES NFR BLD AUTO: 0.4 % (ref 0–0.5)
KETONES UR QL STRIP: NEGATIVE
LDL CHOLESTEROL CALCULATED: 63 MG/DL (ref 0–160)
LDLC SERPL CALC-MCNC: 63 MG/DL (ref 0–100)
LDLC/HDLC SERPL: 1.28 {RATIO}
LEUKOCYTE ESTERASE UR QL STRIP.AUTO: NEGATIVE
LYMPHOCYTES # BLD AUTO: 3.19 10*3/MM3 (ref 0.7–3.1)
LYMPHOCYTES ABSOLUTE: 3.19 /ΜL
LYMPHOCYTES NFR BLD AUTO: 38.5 % (ref 19.6–45.3)
LYMPHOCYTES RELATIVE PERCENT: 38.5 %
MCH RBC QN AUTO: 29.7 PG
MCH RBC QN AUTO: 29.7 PG (ref 26.6–33)
MCHC RBC AUTO-ENTMCNC: 34.7 G/DL
MCHC RBC AUTO-ENTMCNC: 34.7 G/DL (ref 31.5–35.7)
MCV RBC AUTO: 85.6 FL
MCV RBC AUTO: 85.6 FL (ref 79–97)
MICROALBUMIN/CREAT 24H UR: 2.9 MG/G{CREAT}
MICROALBUMIN/CREAT UR-RTO: 8.8
MICROALBUMIN/CREAT UR: 8.8 MG/G
MONOCYTES # BLD AUTO: 0.77 10*3/MM3 (ref 0.1–0.9)
MONOCYTES ABSOLUTE: 0.77 /ΜL
MONOCYTES NFR BLD AUTO: 9.3 % (ref 5–12)
MONOCYTES RELATIVE PERCENT: 9.3 %
NEUTROPHILS # BLD AUTO: 4 10*3/MM3 (ref 1.7–7)
NEUTROPHILS ABSOLUTE: 4 /ΜL
NEUTROPHILS NFR BLD AUTO: 48.3 % (ref 42.7–76)
NEUTROPHILS RELATIVE PERCENT: 48.3 %
NITRITE UR QL STRIP: NEGATIVE
NRBC BLD AUTO-RTO: 0 /100 WBC (ref 0–0.2)
PDW BLD-RTO: 12.9 %
PH UR STRIP.AUTO: <=5 [PH] (ref 5–8)
PLATELET # BLD AUTO: 265 10*3/MM3 (ref 140–450)
PLATELET # BLD: 265 K/ΜL
PMV BLD AUTO: 12.2 FL
PMV BLD AUTO: 12.2 FL (ref 6–12)
POTASSIUM BLD-SCNC: 4.2 MMOL/L (ref 3.5–5.2)
POTASSIUM SERPL-SCNC: 4.2 MMOL/L
PROT SERPL-MCNC: 6.9 G/DL (ref 6–8.5)
PROT UR QL STRIP: ABNORMAL
RBC # BLD AUTO: 4.44 10*6/MM3 (ref 3.77–5.28)
RBC # BLD: 4.44 10^6/ΜL
SODIUM BLD-SCNC: 139 MMOL/L
SODIUM BLD-SCNC: 139 MMOL/L (ref 136–145)
SP GR UR STRIP: >=1.03 (ref 1–1.03)
TOTAL PROTEIN: 6.9
TRIGL SERPL-MCNC: 187 MG/DL
TRIGL SERPL-MCNC: 187 MG/DL (ref 0–150)
TSH SERPL DL<=0.05 MIU/L-ACNC: 3.78 UIU/ML
TSH SERPL DL<=0.05 MIU/L-ACNC: 3.78 UIU/ML (ref 0.27–4.2)
UROBILINOGEN UR QL STRIP: ABNORMAL
VITAMIN D 25-HYDROXY: 26.6
VITAMIN D2, 25 HYDROXY: NORMAL
VITAMIN D3,25 HYDROXY: NORMAL
VLDLC SERPL CALC-MCNC: 37.4 MG/DL
VLDLC SERPL-MCNC: 37.4 MG/DL (ref 5–40)
WBC # BLD: 8.28 10^3/ML
WBC NRBC COR # BLD: 8.28 10*3/MM3 (ref 3.4–10.8)

## 2020-02-11 PROCEDURE — 82306 VITAMIN D 25 HYDROXY: CPT | Performed by: INTERNAL MEDICINE

## 2020-02-11 PROCEDURE — 36415 COLL VENOUS BLD VENIPUNCTURE: CPT | Performed by: INTERNAL MEDICINE

## 2020-02-11 PROCEDURE — 81003 URINALYSIS AUTO W/O SCOPE: CPT | Performed by: INTERNAL MEDICINE

## 2020-02-11 PROCEDURE — 84443 ASSAY THYROID STIM HORMONE: CPT | Performed by: INTERNAL MEDICINE

## 2020-02-11 PROCEDURE — 82043 UR ALBUMIN QUANTITATIVE: CPT | Performed by: INTERNAL MEDICINE

## 2020-02-11 PROCEDURE — 80053 COMPREHEN METABOLIC PANEL: CPT | Performed by: INTERNAL MEDICINE

## 2020-02-11 PROCEDURE — 83036 HEMOGLOBIN GLYCOSYLATED A1C: CPT | Performed by: INTERNAL MEDICINE

## 2020-02-11 PROCEDURE — 80061 LIPID PANEL: CPT | Performed by: INTERNAL MEDICINE

## 2020-02-11 PROCEDURE — 82570 ASSAY OF URINE CREATININE: CPT | Performed by: INTERNAL MEDICINE

## 2020-02-11 PROCEDURE — 85025 COMPLETE CBC W/AUTO DIFF WBC: CPT | Performed by: INTERNAL MEDICINE

## 2020-02-18 ENCOUNTER — OFFICE VISIT (OUTPATIENT)
Dept: INTERNAL MEDICINE | Age: 40
End: 2020-02-18
Payer: COMMERCIAL

## 2020-02-18 VITALS
HEIGHT: 63 IN | OXYGEN SATURATION: 98 % | HEART RATE: 93 BPM | WEIGHT: 249 LBS | BODY MASS INDEX: 44.12 KG/M2 | DIASTOLIC BLOOD PRESSURE: 80 MMHG | RESPIRATION RATE: 18 BRPM | SYSTOLIC BLOOD PRESSURE: 128 MMHG

## 2020-02-18 PROCEDURE — 99395 PREV VISIT EST AGE 18-39: CPT | Performed by: INTERNAL MEDICINE

## 2020-02-18 ASSESSMENT — ENCOUNTER SYMPTOMS
VOICE CHANGE: 0
CONSTIPATION: 0
NAUSEA: 0
EYE REDNESS: 0
TROUBLE SWALLOWING: 0
SORE THROAT: 0
SINUS PRESSURE: 0
COUGH: 0
ABDOMINAL PAIN: 0
DIARRHEA: 0
COLOR CHANGE: 0
BLOOD IN STOOL: 0
EYE PAIN: 0
VOMITING: 0
CHEST TIGHTNESS: 0
WHEEZING: 0

## 2020-02-18 NOTE — PROGRESS NOTES
Chief Complaint:   Melva Moody is a 44 y.o. female who presents forcomplete physical exam.    History of Present Illness:      Melva Moody is a 44 y.o. female who presents todayfor wellness visit AND follow up on her chronic medical conditions as noted below. Mixed hyperlipidemia-she has been taking her crestor 40 daily     Generalized anxiety disorder-she states her stress levels are better on current Lexapro dose less anxious compared to Pristiq     Type 2 diabetes mellitus without complication, without long-term current use of insulin ([address]- states that she has has not been good about diet  Since last time she was here we initiated insulin in October 2019  Insulin dose has been increased several times from 15 units to 72 units currently  She has gained few more pounds of weight/unable to lose weight     Essential hypertension-Patient reports her Bp has been well controlled ( systolic below 377; diastolic below 90) at home when checked with home/ store equipment. No side effects related to blood pressure medications were reported by patient       Patient Active Problem List    Diagnosis Date Noted    Medication intolerance 01/17/2019     Intolerance to metformin      Generalized anxiety disorder 09/19/2017    Essential hypertension 09/18/2017    Type 2 diabetes mellitus without complication, without long-term current use of insulin ([address] 09/18/2017    Fatty liver 09/18/2017    Morbid obesity due to excess calories ([address] 09/18/2017    Mixed hyperlipidemia 09/18/2017    Chronic midline low back pain without sciatica 09/18/2017       No past medical history on file.     Past Surgical History:   Procedure Laterality Date    CHOLECYSTECTOMY         Current Outpatient Medications   Medication Sig Dispense Refill    Liraglutide (VICTOZA) 18 MG/3ML SOPN SC injection Inject 1.8 mg into the skin daily 3 pen 3    Insulin Glargine, 1 Unit Dial, (TOUJEO SOLOSTAR) 300 UNIT/ML SOPN Inject 50 Units into the skin Thyroid: No thyromegaly. Vascular: No JVD. Cardiovascular:      Rate and Rhythm: Normal rate and regular rhythm. Heart sounds: Normal heart sounds. No murmur. Pulmonary:      Effort: Pulmonary effort is normal. No respiratory distress. Breath sounds: Normal breath sounds. No wheezing. Chest:      Chest wall: No tenderness. Abdominal:      General: Bowel sounds are normal.      Palpations: Abdomen is soft. There is no mass. Tenderness: There is no abdominal tenderness. Musculoskeletal:         General: No tenderness. Lymphadenopathy:      Cervical: No cervical adenopathy. Skin:     General: Skin is warm and dry. Findings: No erythema or rash. Neurological:      Mental Status: She is alert and oriented to person, place, and time. Cranial Nerves: No cranial nerve deficit. Coordination: Coordination normal.      Deep Tendon Reflexes: Reflexes are normal and symmetric. Psychiatric:         Behavior: Behavior normal.           ASSESSMENT/PLAN  Annual physical exam  * pap per dr Neil Regalado  * pneumovax 12/17       Mixed hyperlipidemia- triglycerides 179( 211)(153)( 200),   LDL 63 ( 86) (81)(143)(94 ) ,  LDL better  Continue Crestor 40 + diet     Morbid obesity due to excess calories  She lost weight in 2018, then regained her weight loss.   Compared to prior weight from August 2019, weight is higher again  Her diabetes control is significantly worse  We have discussed in length again today regarding diet/exercise/weight loss  Pt was given counseling about diet and exercise including education on what calories are, where calories come from, the need for portion control,following lower carbohydrate dietary regimen and healthy snacks along side an active lifestyle with supplementary exercise of approx 30 minutes a week, 5 days a week of exercise for weight loss.  The patient voiced increased understanding of the topics discussed.       Generalized anxiety

## 2020-03-04 ENCOUNTER — OFFICE VISIT (OUTPATIENT)
Dept: BARIATRICS/WEIGHT MGMT | Facility: CLINIC | Age: 40
End: 2020-03-04

## 2020-03-04 VITALS
WEIGHT: 250.2 LBS | HEIGHT: 64 IN | HEART RATE: 83 BPM | BODY MASS INDEX: 42.72 KG/M2 | OXYGEN SATURATION: 98 % | DIASTOLIC BLOOD PRESSURE: 77 MMHG | TEMPERATURE: 97.8 F | SYSTOLIC BLOOD PRESSURE: 117 MMHG

## 2020-03-04 DIAGNOSIS — E11.9 TYPE 2 DIABETES MELLITUS WITHOUT COMPLICATION, UNSPECIFIED WHETHER LONG TERM INSULIN USE (HCC): ICD-10-CM

## 2020-03-04 DIAGNOSIS — E78.00 HIGH CHOLESTEROL: ICD-10-CM

## 2020-03-04 DIAGNOSIS — E66.01 CLASS 3 SEVERE OBESITY DUE TO EXCESS CALORIES WITH SERIOUS COMORBIDITY AND BODY MASS INDEX (BMI) OF 40.0 TO 44.9 IN ADULT (HCC): Primary | ICD-10-CM

## 2020-03-04 PROCEDURE — 99204 OFFICE O/P NEW MOD 45 MIN: CPT | Performed by: SURGERY

## 2020-03-04 NOTE — PROGRESS NOTES
Patient Care Team:  Gayatri Aquino MD as PCP - General  Gayatri Aquino MD as PCP - Family Medicine    Reason for Visit:  Surgical Weight loss    Subjective     Patient is a 39 y.o. female presents with morbid obesity and her Body mass index is 43.28 kg/m².     She is here for discussion of surgical weight loss options.  She stated she has been with the disease of obesity for year(s).  She stated she suffers from diabetes, hypertension, hyperlipidemia, polycystic ovarian syndrome and morbid obesity due to her weight gain.  She stated that weight loss helps alleviate these symptoms.   She stated that she has tried multiple diet regimens including counting calories, low carbohydrate diets and medications such as phentermine to help with weight loss.  She stated that she has attempted these conservative methods for weight loss without maintaining long term success.  Today she would like to discuss surgical weight loss options such as the Laparoscopic Sleeve Gastrectomy or the Laparoscopic R - Y Gastric Bypass.     Review of Systems  Negative except the below listed  General ROS: positive for  - fatigue  Ophthalmic ROS: positive for - uses glasses  ENT ROS: positive for - nasal discharge  Endocrine ROS: positive for - malaise/lethargy  Genito-Urinary ROS: positive for - dysmenorrhea and irregular/heavy menses  Musculoskeletal ROS: positive for - joint pain  Dermatological ROS: positive for rash    History  Past Medical History:   Diagnosis Date   • Anxiety    • Chronic fatigue    • Diabetes mellitus (CMS/HCC)    • High cholesterol    • PCOS (polycystic ovarian syndrome)    • Rheumatoid arthritis (CMS/HCC)      Past Surgical History:   Procedure Laterality Date   • CHOLECYSTECTOMY      lap   • WISDOM TOOTH EXTRACTION       Family History   Problem Relation Age of Onset   • Cancer Mother    • Hypertension Mother    • Obesity Mother    • Diabetes Mother    • COPD Father    • Diabetes Father    • Obesity Father    •  Hypertension Father      Social History     Tobacco Use   • Smoking status: Former Smoker     Last attempt to quit: 3/4/2006     Years since quittin.0   • Smokeless tobacco: Never Used   Substance Use Topics   • Alcohol use: Never     Frequency: Never   • Drug use: Never       (Not in a hospital admission)  Allergies:  Penicillins      Current Outpatient Medications:   •  Dulaglutide 1.5 MG/0.5ML solution pen-injector, Inject 1.5 mg into the skin once a week, Disp: 2 mL, Rfl: 3  •  escitalopram (LEXAPRO) 10 MG tablet, Take 1 tablet by mouth daily, Disp: 90 tablet, Rfl: 1  •  hydroCHLOROthiazide (HYDRODIURIL) 12.5 MG tablet, Take 1 tablet by mouth every day with olmesartan, Disp: 120 tablet, Rfl: 0  •  Insulin Glargine, 1 Unit Dial, (TOUJEO) 300 UNIT/ML solution pen-injector injection, Inject 50 units into the skin nightly, Disp: 6 mL, Rfl: 3  •  Insulin Pen Needle 32G X 4 MM misc, Use one daily, Disp: 100 each, Rfl: 3  •  Levonorgest-Eth Estrad -Day 0.15-0.03 &0.01 MG tablet, Take one tablet by mouth once a day for 91 days., Disp: 91 each, Rfl: 3  •  Liraglutide (VICTOZA) 18 MG/3ML solution pen-injector injection, Inject 1.8 mg into the skin daily, Disp: 6 pen, Rfl: 3  •  nebivolol (BYSTOLIC) 10 MG tablet, Take 1 tablet by mouth every morning, Disp: 90 tablet, Rfl: 1  •  olmesartan-hydrochlorothiazide (BENICAR HCT) 40-12.5 MG per tablet, Take 1 tablet by mouth daily, Disp: 30 tablet, Rfl: 3  •  rosuvastatin (CRESTOR) 40 MG tablet, Take 1 tablet by mouth daily, Disp: 90 tablet, Rfl: 1  •  desvenlafaxine (PRISTIQ) 100 MG 24 hr tablet, Take 1 tablet by mouth daily., Disp: 90 tablet, Rfl: 1  •  Dulaglutide 1.5 MG/0.5ML solution pen-injector, Inject 1.5 mg into the skin once a week, Disp: 2 mL, Rfl: 3  •  Empagliflozin 25 MG tablet, Take 1 tablet by mouth daily, Disp: 30 tablet, Rfl: 2  •  escitalopram (LEXAPRO) 10 MG tablet, Take 1 tablet by mouth daily, Disp: 90 tablet, Rfl: 1  •  fluconazole (DIFLUCAN) 100 MG  tablet, Take 1 tablet by mouth every 7 days, Disp: 4 tablet, Rfl: 3  •  fluconazole (DIFLUCAN) 150 MG tablet, Take 1 tablet by mouth daily for 3 days, Disp: 3 tablet, Rfl: 0  •  nebivolol (BYSTOLIC) 10 MG tablet, Take 1 tablet by mouth every morning, Disp: 90 tablet, Rfl: 1  •  nebivolol (BYSTOLIC) 10 MG tablet, Take 1 tablet by mouth every morning, Disp: 30 tablet, Rfl: 5  •  olmesartan (BENICAR) 40 MG tablet, Take 1 tablet by mouth every day with hydrochlorothiazide. Replaces combo tablet, Disp: 120 tablet, Rfl: 0  •  olmesartan-hydrochlorothiazide (BENICAR HCT) 40-12.5 MG per tablet, Take 1 tablet by mouth daily, Disp: 30 tablet, Rfl: 5  •  olmesartan-hydrochlorothiazide (BENICAR HCT) 40-12.5 MG per tablet, Take 1 tablet by mouth daily, Disp: 90 tablet, Rfl: 1  •  olmesartan-hydrochlorothiazide (BENICAR HCT) 40-12.5 MG per tablet, Take 1 tablet by mouth daily, Disp: 90 tablet, Rfl: 1  •  rosuvastatin (CRESTOR) 40 MG tablet, Take 1 tablet by mouth daily, Disp: 90 tablet, Rfl: 1  •  rosuvastatin (CRESTOR) 40 MG tablet, Take 1 tablet by mouth daily, Disp: 90 tablet, Rfl: 1  •  rosuvastatin (CRESTOR) 40 MG tablet, Take 1 tablet by mouth daily, Disp: 90 tablet, Rfl: 1    Objective     Vital Signs  Temp:  [97.8 °F (36.6 °C)] 97.8 °F (36.6 °C)  Heart Rate:  [83] 83  BP: (117)/(77) 117/77  Body mass index is 43.28 kg/m².      03/04/20  0805   Weight: 113 kg (250 lb 3.2 oz)       General Appearance:  awake, alert, oriented, in no acute distress  Lungs:  Normal expansion.  Clear to auscultation.  No rales, rhonchi, or wheezing.  Heart:  Heart sounds are normal.  Regular rate and rhythm without murmur, gallop or rub.  Abdomen:  Soft, non-tender, normal bowel sounds; no bruits, organomegaly or masses.  Abnormal shape: obese  Extremities: Extremities warm to touch, pink, with no edema.      Results Review:   None        Assessment/Plan   Encounter Diagnoses   Name Primary?   • Class 3 severe obesity due to excess calories  "with serious comorbidity and body mass index (BMI) of 40.0 to 44.9 in adult (CMS/[facility]) Yes   • Type 2 diabetes mellitus without complication, unspecified whether long term insulin use (CMS/[facility])    • High cholesterol        I believe this patient will be a good candidate for weight loss surgery.    She has chosen laparoscopic sleeve gastrectomy. I agree with this decision.  I have discussed the Fredrick - Y Gastric Bypass, laparoscopic sleeve gastrectomy and the Laparoscopic Gastric Band procedures to provide the alternatives which also includes non surgical weight loss options as well.  We discussed the benefits of the surgeries including the benefit of weight loss and the possible reversal of co-morbid conditions associated with morbid obesity. I explained to her that prior to making a definitive decision on the type of surgery she will require a study of the upper GI system.  I explained to her why the EGD is recommended.  She has been provided a structured dietary regimen based off of her behavior.  I discussed with the patient the etiology of the disease of obesity and the potential comorbid conditions associated with this disease.  She was instructed to follow the dietary regimen and follow-up with our program in 1 month's time with any additional questions as they may arise during this time.  We emphasized on focusing on proteins and meals high in fiber as well as adequate hydration that exceed 64 ounces of water daily.  The patient reports that her high cholesterol and diabetes have remained stable on the current treatment regimen.  Anticipate improvement of these comorbid conditions with reversal of her morbid obesity.  I explained that I anticipate the patient to lose 6 pounds prior to her next monthly visit.  I have also explained that they need to record or document when they are going to have the \"cheat day\".    I discussed the patient's findings and my recommendations with patient.     I have also " recommended that she obtain completion of a medically supervised weight loss program, psych and cardiac evaluation prior to surgery consideration.    Dr. Kermit Marlow MD FACS    03/04/20  8:44 AM  Patient Care Team:  Gayatri Aquino MD as PCP - General  Gayatri Aquino MD as PCP - Family Medicine

## 2020-04-01 ENCOUNTER — TELEPHONE (OUTPATIENT)
Dept: BARIATRICS/WEIGHT MGMT | Facility: CLINIC | Age: 40
End: 2020-04-01

## 2020-04-01 NOTE — TELEPHONE ENCOUNTER
Called patient regarding her apt for 04/06/2020. She said she would like to do video visit. I told her I would switch her to that and to call the office if she had any questions or issues. I also told her she has to do it from the destini on her phone.

## 2020-04-03 ENCOUNTER — TELEPHONE (OUTPATIENT)
Dept: BARIATRICS/WEIGHT MGMT | Facility: CLINIC | Age: 40
End: 2020-04-03

## 2020-04-06 ENCOUNTER — TELEMEDICINE (OUTPATIENT)
Dept: BARIATRICS/WEIGHT MGMT | Facility: CLINIC | Age: 40
End: 2020-04-06

## 2020-04-06 DIAGNOSIS — E66.01 CLASS 3 SEVERE OBESITY DUE TO EXCESS CALORIES WITH SERIOUS COMORBIDITY AND BODY MASS INDEX (BMI) OF 40.0 TO 44.9 IN ADULT (HCC): Primary | ICD-10-CM

## 2020-04-06 PROBLEM — E66.813 CLASS 3 SEVERE OBESITY DUE TO EXCESS CALORIES WITH SERIOUS COMORBIDITY AND BODY MASS INDEX (BMI) OF 40.0 TO 44.9 IN ADULT: Status: ACTIVE | Noted: 2020-04-06

## 2020-04-06 PROCEDURE — 99213 OFFICE O/P EST LOW 20 MIN: CPT | Performed by: SURGERY

## 2020-04-06 NOTE — PROGRESS NOTES
"Patient Care Team:  Gayatri Aquino MD as PCP - General  Gayatri Aquino MD as PCP - Family Medicine    Reason for Visit:  Surgical Weight loss    Type of visit: MyChart video visit  Location of patient: Home  Location of Provider: [facility] office    Subjective   Harry Panda is a 39 y.o. female.     Harry is here for follow-up and continued medical management of her morbid obesity.  She is currently on a prescription diet.  Harry previously was to apply dietary changes such as following the dietary prescription as directed.  She admits to eating 4 meals a day, consuming at least 64 ounces of water and currently is unable to calculate her proteins.  As a result she lost weight since her last visit.    Review Of Systems:  Negative     The following portions of the patient's history were reviewed and updated as appropriate: allergies, current medications, past medical history, past surgical history and problem list.    Objective   There were no vitals taken for this visit.    General Appearance:  awake, alert, oriented, in no acute distress  Lungs:  Breathing Pattern: regular, no distress    Assessment/Plan     Encounter Diagnoses   Name Primary?   • Class 3 severe obesity due to excess calories with serious comorbidity and body mass index (BMI) of 40.0 to 44.9 in adult (CMS/[facility]) Yes       Harry Panda was seen today for follow-up, obesity, nutrition counseling and weight loss.  She has lost weight since her last visit.  Today we discussed healthy changes in lifestyle, diet, and exercise. Dietician consultation obtained.  Harry Panda had received handouts to her explaining the recommendation on portion sizes/appetite control/reading nutrition labels.   Intensive behavioral therapy for obesity was done today as well.   Goals for this month are: Continue following the dietary prescription.  I will also send an email to her for the \"perfect protein\" sheet so that she may accurately calculate relate or better calculate her " total proteins that she consumes daily.  A total of 15 minutes was spent face-to-face with this patient during this encounter and over half the time was spent on counseling and coordination of care of the patient's morbid obesity.    Follow up in one month for a weight recheck.

## 2020-05-04 VITALS — WEIGHT: 242 LBS | HEIGHT: 64 IN | BODY MASS INDEX: 41.32 KG/M2

## 2020-05-05 ENCOUNTER — DOCUMENTATION (OUTPATIENT)
Dept: BARIATRICS/WEIGHT MGMT | Facility: CLINIC | Age: 40
End: 2020-05-05

## 2020-05-05 ENCOUNTER — TELEMEDICINE (OUTPATIENT)
Dept: BARIATRICS/WEIGHT MGMT | Facility: CLINIC | Age: 40
End: 2020-05-05

## 2020-05-05 DIAGNOSIS — E66.01 CLASS 3 SEVERE OBESITY DUE TO EXCESS CALORIES WITH SERIOUS COMORBIDITY AND BODY MASS INDEX (BMI) OF 40.0 TO 44.9 IN ADULT (HCC): ICD-10-CM

## 2020-05-05 DIAGNOSIS — Z01.818 PREOP EXAMINATION: Primary | ICD-10-CM

## 2020-05-05 PROCEDURE — 99421 OL DIG E/M SVC 5-10 MIN: CPT | Performed by: SURGERY

## 2020-05-05 NOTE — PROGRESS NOTES
Patient Care Team:  Gayatri Aquino MD as PCP - General  Gayatri Aquino MD as PCP - Family Medicine      You have chosen to receive care through a telehealth visit.  Do you consent to use a video/audio connection for your medical care today? Yes    Type of Visit: Video  Location of Patient: Home  Location of Provider: [place] [facility]      Reason for Visit:  Surgical Weight loss    Subjective     Patient is a 39 y.o. female presents with morbid obesity and her Body mass index is 41.87 kg/m².     She is here for discussion about the esophagogastroduodenoscopy procedure.  She stated she has been with the disease of obesity for year(s).  She stated she suffers from DM II, PCOS and Morbid Obesity due to her weight gain.  She stated that weight loss helps alleviate these symptoms.   She stated that she has tried diets to help with weight loss.  She stated that she has attempted these conservative methods for weight loss without maintaining long term success.        Review of Systems  Negative     History  Past Medical History:   Diagnosis Date   • Anxiety    • Chronic fatigue    • Diabetes mellitus (CMS/HCC)    • High cholesterol    • PCOS (polycystic ovarian syndrome)    • Rheumatoid arthritis (CMS/HCC)      Past Surgical History:   Procedure Laterality Date   • CHOLECYSTECTOMY      lap   • WISDOM TOOTH EXTRACTION       Family History   Problem Relation Age of Onset   • Cancer Mother    • Hypertension Mother    • Obesity Mother    • Diabetes Mother    • COPD Father    • Diabetes Father    • Obesity Father    • Hypertension Father      Social History     Tobacco Use   • Smoking status: Former Smoker     Last attempt to quit: 3/4/2006     Years since quittin.1   • Smokeless tobacco: Never Used   Substance Use Topics   • Alcohol use: Never     Frequency: Never   • Drug use: Never       (Not in a hospital admission)  Allergies:  Penicillins      Current Outpatient Medications:   •  Dulaglutide 1.5 MG/0.5ML solution  pen-injector, Inject 1.5 mg into the skin once a week, Disp: 2 mL, Rfl: 3  •  Empagliflozin 25 MG tablet, Take 1 tablet by mouth daily, Disp: 30 tablet, Rfl: 2  •  escitalopram (LEXAPRO) 10 MG tablet, Take 1 tablet by mouth daily, Disp: 90 tablet, Rfl: 1  •  hydroCHLOROthiazide (HYDRODIURIL) 12.5 MG tablet, Take 1 tablet by mouth every day with olmesartan, Disp: 120 tablet, Rfl: 0  •  Insulin Glargine, 1 Unit Dial, (TOUJEO) 300 UNIT/ML solution pen-injector injection, Inject 50 units into the skin nightly, Disp: 6 mL, Rfl: 3  •  Insulin Pen Needle 32G X 4 MM misc, Use one daily, Disp: 100 each, Rfl: 3  •  Levonorgest-Eth Estrad 91-Day 0.15-0.03 &0.01 MG tablet, Take one tablet by mouth once a day for 91 days., Disp: 91 each, Rfl: 3  •  nebivolol (BYSTOLIC) 10 MG tablet, Take 1 tablet by mouth every morning, Disp: 90 tablet, Rfl: 1  •  olmesartan-hydrochlorothiazide (BENICAR HCT) 40-12.5 MG per tablet, Take 1 tablet by mouth daily, Disp: 30 tablet, Rfl: 3  •  rosuvastatin (CRESTOR) 40 MG tablet, Take 1 tablet by mouth daily, Disp: 90 tablet, Rfl: 1  •  desvenlafaxine (PRISTIQ) 100 MG 24 hr tablet, Take 1 tablet by mouth daily., Disp: 90 tablet, Rfl: 1  •  Dulaglutide 1.5 MG/0.5ML solution pen-injector, Inject 1.5 mg into the skin once a week, Disp: 2 mL, Rfl: 3  •  escitalopram (LEXAPRO) 10 MG tablet, Take 1 tablet by mouth daily, Disp: 90 tablet, Rfl: 1  •  fluconazole (DIFLUCAN) 100 MG tablet, Take 1 tablet by mouth every 7 days, Disp: 4 tablet, Rfl: 3  •  fluconazole (DIFLUCAN) 150 MG tablet, Take 1 tablet by mouth daily for 3 days, Disp: 3 tablet, Rfl: 0  •  Insulin Glargine, 1 Unit Dial, (TOUJEO) 300 UNIT/ML solution pen-injector injection, Inject 50 Units into the skin nightly, Disp: 6 mL, Rfl: 3  •  Liraglutide (VICTOZA) 18 MG/3ML solution pen-injector injection, Inject 1.8 mg into the skin daily, Disp: 6 pen, Rfl: 3  •  nebivolol (BYSTOLIC) 10 MG tablet, Take 1 tablet by mouth every morning, Disp: 90 tablet,  Rfl: 1  •  nebivolol (BYSTOLIC) 10 MG tablet, Take 1 tablet by mouth every morning, Disp: 30 tablet, Rfl: 5  •  olmesartan (BENICAR) 40 MG tablet, Take 1 tablet by mouth every day with hydrochlorothiazide. Replaces combo tablet, Disp: 120 tablet, Rfl: 0  •  olmesartan-hydrochlorothiazide (BENICAR HCT) 40-12.5 MG per tablet, Take 1 tablet by mouth daily, Disp: 30 tablet, Rfl: 5  •  olmesartan-hydrochlorothiazide (BENICAR HCT) 40-12.5 MG per tablet, Take 1 tablet by mouth daily, Disp: 90 tablet, Rfl: 1  •  olmesartan-hydrochlorothiazide (BENICAR HCT) 40-12.5 MG per tablet, Take 1 tablet by mouth daily, Disp: 90 tablet, Rfl: 1  •  rosuvastatin (CRESTOR) 40 MG tablet, Take 1 tablet by mouth daily, Disp: 90 tablet, Rfl: 1  •  rosuvastatin (CRESTOR) 40 MG tablet, Take 1 tablet by mouth daily, Disp: 90 tablet, Rfl: 1  •  rosuvastatin (CRESTOR) 40 MG tablet, Take 1 tablet by mouth daily, Disp: 90 tablet, Rfl: 1    Objective     Vital Signs     Body mass index is 41.87 kg/m².      05/04/20  1457   Weight: 110 kg (242 lb)       Physical Exam:     HEENT: extra ocular movement intact and trachea midline  Respiratory: appears well, vitals normal, no respiratory distress, acyanotic, normal RR, chest clear, no wheezing, crepitations, rhonchi, normal symmetric air entry  Neurologic: alert, oriented, normal speech, no focal findings or movement disorder noted       Results Review:   None        Assessment/Plan   Encounter Diagnoses   Name Primary?   • Class 3 severe obesity due to excess calories with serious comorbidity and body mass index (BMI) of 40.0 to 44.9 in adult (CMS/[facility]) Yes   • Preop examination              1.  I believe this patient will be a good candidate for weight loss surgery.  I have discussed the Fredrick - Y Gastric Bypass, laparoscopic sleeve gastrectomy and the Laparoscopic Gastric Band procedures.  We discussed the benefits of the surgeries including the benefit of weight loss and the possible reversal of  co-morbid conditions associated with morbid obesity. I explained to the patient that prior to making a definitive decision on the type of surgery she will require an esophagogastroduodenoscopy with biopsies to assess for any contraindications for surgical weight loss.  I explained the alternatives  include not doing anything, or pursuing an UGI series which only offers a diagnosis with potential less accuracy compared to EGD, the benefits of the EGD such as identifying the pathology and anatomy of the upper GI system, and the risks and complications of the endoscopy were discussed in detail as well. I discussed the risk of perforation (one out of 4956-0306, riskier with dilation), bleeding (one out of 500), and the rare risks of infection, adverse reaction to anesthesia, respiratory failure, cardiac failure including MI and adverse reaction to medications such as allergic reactions. We discussed consequences that could occur if a risk were to develop such as the need for hospitalization, blood transfusion, surgical intervention, medications, pain, disability and death. The patient verbalizes understanding and agrees to proceed. such as bleeding, perforation, swallowing difficulties and gas bloat can occur after this procedure.    Upon completion of our discussion and addressing and answering her questions to her satisfation, informed consent was obtained.  She will be scheduled accordingly for the esophagogastroduodenoscopy procedure.  A total of 10 minutes was spent face to face with this patient on video about her weight loss treatment and over half of the time was spent on counseling and coordination of care for the disease of obesity. We specifically reviewed the dietary prescription and I made recommendations toward increasing exercise as tolerated as well as focusing on training their behavior toward storing less.  I discussed the patients findings and my recommendations with patient.     Dr. Kermit Marlow  MD PAZ    05/05/20  08:20  Patient Care Team:  Gayatri Aquino MD as PCP - General  Gayatri Aquino MD as PCP - Family Medicine

## 2020-05-13 ENCOUNTER — LAB (OUTPATIENT)
Dept: LAB | Facility: HOSPITAL | Age: 40
End: 2020-05-13

## 2020-05-13 ENCOUNTER — TRANSCRIBE ORDERS (OUTPATIENT)
Dept: LAB | Facility: HOSPITAL | Age: 40
End: 2020-05-13

## 2020-05-13 DIAGNOSIS — E11.9 TYPE 2 DIABETES MELLITUS WITHOUT COMPLICATION, WITHOUT LONG-TERM CURRENT USE OF INSULIN (HCC): ICD-10-CM

## 2020-05-13 DIAGNOSIS — Z11.4 SCREENING FOR HIV (HUMAN IMMUNODEFICIENCY VIRUS): ICD-10-CM

## 2020-05-13 DIAGNOSIS — Z11.4 SCREENING FOR HIV (HUMAN IMMUNODEFICIENCY VIRUS): Primary | ICD-10-CM

## 2020-05-13 LAB
AVERAGE GLUCOSE: NORMAL
HBA1C MFR BLD: 7.4 %
HBA1C MFR BLD: 7.4 % (ref 4.8–5.9)
HIV1+2 AB SER QL: NORMAL

## 2020-05-13 PROCEDURE — G0432 EIA HIV-1/HIV-2 SCREEN: HCPCS | Performed by: INTERNAL MEDICINE

## 2020-05-13 PROCEDURE — 83036 HEMOGLOBIN GLYCOSYLATED A1C: CPT

## 2020-05-13 PROCEDURE — 36415 COLL VENOUS BLD VENIPUNCTURE: CPT

## 2020-05-14 ENCOUNTER — TELEPHONE (OUTPATIENT)
Dept: BARIATRICS/WEIGHT MGMT | Facility: CLINIC | Age: 40
End: 2020-05-14

## 2020-05-14 NOTE — TELEPHONE ENCOUNTER
Called the patient scheduled her EDG for 06/19/2020. Arrival time 8:00 for 9:00 start. Patient was informed of pre-procedure COVID 19 testing and patient drop off and pick upShe voiced an understanding and was agreeable.

## 2020-05-19 ENCOUNTER — PATIENT MESSAGE (OUTPATIENT)
Dept: INTERNAL MEDICINE | Age: 40
End: 2020-05-19

## 2020-05-19 ENCOUNTER — VIRTUAL VISIT (OUTPATIENT)
Dept: INTERNAL MEDICINE | Age: 40
End: 2020-05-19
Payer: COMMERCIAL

## 2020-05-19 VITALS — WEIGHT: 244 LBS | SYSTOLIC BLOOD PRESSURE: 143 MMHG | BODY MASS INDEX: 43.22 KG/M2 | DIASTOLIC BLOOD PRESSURE: 88 MMHG

## 2020-05-19 PROCEDURE — 3051F HG A1C>EQUAL 7.0%<8.0%: CPT | Performed by: INTERNAL MEDICINE

## 2020-05-19 PROCEDURE — 99214 OFFICE O/P EST MOD 30 MIN: CPT | Performed by: INTERNAL MEDICINE

## 2020-05-19 RX ORDER — VILAZODONE HYDROCHLORIDE 20 MG/1
20 TABLET ORAL DAILY
Qty: 30 TABLET | Refills: 2 | Status: SHIPPED | OUTPATIENT
Start: 2020-05-19 | End: 2020-08-19

## 2020-05-19 ASSESSMENT — PATIENT HEALTH QUESTIONNAIRE - PHQ9
1. LITTLE INTEREST OR PLEASURE IN DOING THINGS: 0
SUM OF ALL RESPONSES TO PHQ9 QUESTIONS 1 & 2: 0
2. FEELING DOWN, DEPRESSED OR HOPELESS: 0
SUM OF ALL RESPONSES TO PHQ QUESTIONS 1-9: 0
SUM OF ALL RESPONSES TO PHQ QUESTIONS 1-9: 0

## 2020-05-19 ASSESSMENT — ENCOUNTER SYMPTOMS
SORE THROAT: 0
WHEEZING: 0
CHEST TIGHTNESS: 0
COUGH: 0
CONSTIPATION: 0
ABDOMINAL PAIN: 0

## 2020-05-19 NOTE — PROGRESS NOTES
excess calories ([facility] [address] 2017    Mixed hyperlipidemia 2017    Chronic midline low back pain without sciatica 2017     No past medical history on file. Past Surgical History:   Procedure Laterality Date    CHOLECYSTECTOMY       Current Outpatient Medications   Medication Sig Dispense Refill    Insulin Glargine, 1 Unit Dial, (TOUJEO SOLOSTAR) 300 UNIT/ML SOPN Inject 50 Units into the skin nightly 4 pen 3    Liraglutide (VICTOZA) 18 MG/3ML SOPN SC injection Inject 1.8 mg into the skin daily 3 pen 3    nebivolol (BYSTOLIC) 10 MG tablet Take 1 tablet by mouth every morning (Patient not taking: Reported on 2020) 30 tablet 5    Insulin Pen Needle 32G X 4 MM MISC 1 each by Does not apply route daily 100 each 3    rosuvastatin (CRESTOR) 40 MG tablet Take 1 tablet by mouth daily 90 tablet 1    olmesartan-hydrochlorothiazide (BENICAR HCT) 40-12.5 MG per tablet Take 1 tablet by mouth daily 90 tablet 1    escitalopram (LEXAPRO) 10 MG tablet Take 1 tablet by mouth daily 90 tablet 1     No current facility-administered medications for this visit. Allergies   Allergen Reactions    Penicillins      unknown     Social History     Tobacco Use    Smoking status: Former Smoker     Packs/day: 0.50     Years: 7.00     Pack years: 3.50     Types: Cigarettes     Last attempt to quit:      Years since quittin.3    Smokeless tobacco: Never Used   Substance Use Topics    Alcohol use: No      Family History   Problem Relation Age of Onset    Diabetes Mother     High Blood Pressure Mother     Colon Cancer Mother 61    Diabetes Father        Review of Systems   Constitutional: Positive for fatigue. Negative for chills and fever. HENT: Negative for congestion, ear pain, nosebleeds, postnasal drip and sore throat. Respiratory: Negative for cough, chest tightness and wheezing. Cardiovascular: Negative for chest pain, palpitations and leg swelling.    Gastrointestinal: Negative for

## 2020-05-19 NOTE — TELEPHONE ENCOUNTER
From: Diane Stotu  To: Yordy Mathis MD  Sent: 5/19/2020 10:47 AM CDT  Subject: Prescription Question    I initially called the pharmacy, and they said that they could not check without a prescription, but to call my insurance company. Jessica from [facility] indicated that the Viibryd 10mg, 20 mg and 40 mg are 42.17 for a month supply. Thank you!

## 2020-05-29 ENCOUNTER — OFFICE VISIT (OUTPATIENT)
Dept: OBSTETRICS AND GYNECOLOGY | Facility: CLINIC | Age: 40
End: 2020-05-29

## 2020-05-29 VITALS
HEIGHT: 64 IN | BODY MASS INDEX: 42.17 KG/M2 | SYSTOLIC BLOOD PRESSURE: 120 MMHG | WEIGHT: 247 LBS | DIASTOLIC BLOOD PRESSURE: 90 MMHG

## 2020-05-29 DIAGNOSIS — N91.2 AMENORRHEA: ICD-10-CM

## 2020-05-29 DIAGNOSIS — Z01.419 ENCOUNTER FOR GYNECOLOGICAL EXAMINATION WITHOUT ABNORMAL FINDING: Primary | ICD-10-CM

## 2020-05-29 DIAGNOSIS — R10.2 PELVIC PAIN: ICD-10-CM

## 2020-05-29 DIAGNOSIS — Z12.31 ENCOUNTER FOR SCREENING MAMMOGRAM FOR MALIGNANT NEOPLASM OF BREAST: ICD-10-CM

## 2020-05-29 DIAGNOSIS — Z87.891 FORMER SMOKER: ICD-10-CM

## 2020-05-29 PROBLEM — E11.9 TYPE 2 DIABETES MELLITUS WITHOUT COMPLICATION, WITHOUT LONG-TERM CURRENT USE OF INSULIN: Status: ACTIVE | Noted: 2017-09-18

## 2020-05-29 PROBLEM — K76.0 FATTY LIVER: Status: ACTIVE | Noted: 2017-09-18

## 2020-05-29 PROBLEM — Z78.9 MEDICATION INTOLERANCE: Status: ACTIVE | Noted: 2019-01-17

## 2020-05-29 PROBLEM — M54.50 CHRONIC MIDLINE LOW BACK PAIN WITHOUT SCIATICA: Status: ACTIVE | Noted: 2017-09-18

## 2020-05-29 PROBLEM — I10 ESSENTIAL HYPERTENSION: Status: ACTIVE | Noted: 2017-09-18

## 2020-05-29 PROBLEM — F41.1 GENERALIZED ANXIETY DISORDER: Status: ACTIVE | Noted: 2017-09-19

## 2020-05-29 PROBLEM — G89.29 CHRONIC MIDLINE LOW BACK PAIN WITHOUT SCIATICA: Status: ACTIVE | Noted: 2017-09-18

## 2020-05-29 PROCEDURE — G0123 SCREEN CERV/VAG THIN LAYER: HCPCS | Performed by: NURSE PRACTITIONER

## 2020-05-29 PROCEDURE — 87624 HPV HI-RISK TYP POOLED RSLT: CPT | Performed by: NURSE PRACTITIONER

## 2020-05-29 PROCEDURE — 99395 PREV VISIT EST AGE 18-39: CPT | Performed by: NURSE PRACTITIONER

## 2020-05-29 NOTE — PROGRESS NOTES
"Subjective   Harry Panda is a 39 y.o. female.     Annual exam.     Left ovary pain, 2-3  Months, irregular periods.         The following portions of the patient's history were reviewed and updated as appropriate: allergies, current medications, past family history, past medical history, past social history, past surgical history and problem list.    /90 (BP Location: Left arm, Patient Position: Sitting, Cuff Size: Large Adult)   Ht 162.6 cm (64\")   Wt 112 kg (247 lb)   BMI 42.40 kg/m²     Review of Systems   Constitutional: Negative for activity change, appetite change, fatigue and fever.   HENT: Negative for congestion, sore throat and trouble swallowing.    Eyes: Negative for pain, discharge and visual disturbance.   Respiratory: Negative for apnea, shortness of breath and wheezing.    Cardiovascular: Negative for chest pain, palpitations and leg swelling.   Gastrointestinal: Negative for abdominal pain, constipation and diarrhea.   Genitourinary: Positive for menstrual problem and pelvic pain (left). Negative for frequency, urgency and vaginal discharge.        Off seasonique, Josephine or July 2019.   LMP March   Musculoskeletal: Negative for back pain and gait problem.   Skin: Negative for color change and rash.   Neurological: Negative for dizziness, weakness and numbness.   Psychiatric/Behavioral: Negative for confusion and sleep disturbance.       Objective   Physical Exam   Constitutional: She is oriented to person, place, and time. She appears well-developed and well-nourished. No distress.   HENT:   Head: Normocephalic.   Right Ear: External ear normal.   Left Ear: External ear normal.   Nose: Nose normal.   Mouth/Throat: Oropharynx is clear and moist.   Eyes: Conjunctivae are normal. Right eye exhibits no discharge. Left eye exhibits no discharge. No scleral icterus.   Neck: Normal range of motion. Neck supple. Carotid bruit is not present. No tracheal deviation present. No thyromegaly present. "   Cardiovascular: Normal rate, regular rhythm, normal heart sounds and intact distal pulses.   No murmur heard.  Pulmonary/Chest: Effort normal and breath sounds normal. No respiratory distress. She has no wheezes. Right breast exhibits no inverted nipple, no mass, no nipple discharge, no skin change and no tenderness. Left breast exhibits no inverted nipple, no mass, no nipple discharge, no skin change and no tenderness. No breast swelling, tenderness, discharge or bleeding. Breasts are symmetrical.   Abdominal: Soft. She exhibits no distension and no mass. There is no tenderness. There is no guarding. No hernia. Hernia confirmed negative in the right inguinal area and confirmed negative in the left inguinal area.   Genitourinary: Rectum normal, vagina normal and uterus normal. Rectal exam shows no mass. No breast swelling, tenderness, discharge or bleeding. Pelvic exam was performed with patient supine. There is no rash, tenderness, lesion or injury on the right labia. There is no rash, tenderness, lesion or injury on the left labia. Uterus is not enlarged, not fixed and not tender. Cervix exhibits no motion tenderness, no discharge and no friability. Right adnexum displays no mass, no tenderness and no fullness. Left adnexum displays no mass, no tenderness and no fullness. No erythema, tenderness or bleeding in the vagina. No foreign body in the vagina. No signs of injury around the vagina. No vaginal discharge found.   Genitourinary Comments:   BSU normal  Urethral meatus  Normal  Perineum  Normal   Musculoskeletal: Normal range of motion. She exhibits no edema or tenderness.   Lymphadenopathy:        Head (right side): No submental, no submandibular, no tonsillar, no preauricular, no posterior auricular and no occipital adenopathy present.        Head (left side): No submental, no submandibular, no tonsillar, no preauricular, no posterior auricular and no occipital adenopathy present.     She has no cervical  adenopathy.        Right cervical: No superficial cervical, no deep cervical and no posterior cervical adenopathy present.       Left cervical: No superficial cervical, no deep cervical and no posterior cervical adenopathy present.     She has no axillary adenopathy.        Right: No inguinal adenopathy present.        Left: No inguinal adenopathy present.   Neurological: She is alert and oriented to person, place, and time. Coordination normal.   Skin: Skin is warm and dry. No bruising and no rash noted. She is not diaphoretic. No erythema.   Psychiatric: She has a normal mood and affect. Her behavior is normal. Judgment and thought content normal.   Nursing note and vitals reviewed.      Assessment/Plan    Well woman exam. Pap collected.   Baseline mammogram ordered.     Discussed pt left pelvic pain, irregular menses and current amenorrhea since March.   Pt to return for US and OV r/t amenorrhea and left pelvic pain.     Patient's Body mass index is 42.4 kg/m². BMI is above normal parameters. Recommendations include: educational material.    RV US and OV/prn  Harry was seen today for gynecologic exam.    Diagnoses and all orders for this visit:    Encounter for gynecological examination without abnormal finding  -     Liquid-based Pap Smear, Screening    BMI 40.0-44.9, adult (CMS/[facility])    Former smoker    Encounter for screening mammogram for malignant neoplasm of breast  -     Mammo Screening Digital Tomosynthesis Bilateral With CAD; Future    Amenorrhea    Pelvic pain

## 2020-05-29 NOTE — PATIENT INSTRUCTIONS

## 2020-05-29 NOTE — PROGRESS NOTES
Attempted to obtain health maintenance information, patient unable to provide answers for the following items Hep C screening  .

## 2020-06-03 ENCOUNTER — TELEPHONE (OUTPATIENT)
Dept: BARIATRICS/WEIGHT MGMT | Facility: CLINIC | Age: 40
End: 2020-06-03

## 2020-06-03 LAB
GEN CATEG CVX/VAG CYTO-IMP: NORMAL
HPV I/H RISK 4 DNA CVX QL PROBE+SIG AMP: NOT DETECTED
LAB AP CASE REPORT: NORMAL
LAB AP GYN ADDITIONAL INFORMATION: NORMAL
PATH INTERP SPEC-IMP: NORMAL
STAT OF ADQ CVX/VAG CYTO-IMP: NORMAL

## 2020-06-04 ENCOUNTER — OFFICE VISIT (OUTPATIENT)
Dept: BARIATRICS/WEIGHT MGMT | Facility: HOSPITAL | Age: 40
End: 2020-06-04

## 2020-06-04 ENCOUNTER — OFFICE VISIT (OUTPATIENT)
Dept: BARIATRICS/WEIGHT MGMT | Facility: CLINIC | Age: 40
End: 2020-06-04

## 2020-06-04 VITALS
SYSTOLIC BLOOD PRESSURE: 140 MMHG | DIASTOLIC BLOOD PRESSURE: 85 MMHG | OXYGEN SATURATION: 98 % | BODY MASS INDEX: 42.51 KG/M2 | HEART RATE: 87 BPM | HEIGHT: 64 IN | TEMPERATURE: 98.2 F | WEIGHT: 249 LBS

## 2020-06-04 DIAGNOSIS — E78.00 HIGH CHOLESTEROL: ICD-10-CM

## 2020-06-04 DIAGNOSIS — E28.2 POLYCYSTIC OVARIAN SYNDROME: ICD-10-CM

## 2020-06-04 DIAGNOSIS — Z01.818 ENCOUNTER FOR OTHER PREPROCEDURAL EXAMINATION: ICD-10-CM

## 2020-06-04 DIAGNOSIS — E66.01 CLASS 3 SEVERE OBESITY DUE TO EXCESS CALORIES WITH SERIOUS COMORBIDITY AND BODY MASS INDEX (BMI) OF 40.0 TO 44.9 IN ADULT (HCC): Primary | ICD-10-CM

## 2020-06-04 DIAGNOSIS — I10 ESSENTIAL HYPERTENSION: ICD-10-CM

## 2020-06-04 DIAGNOSIS — E11.9 TYPE 2 DIABETES MELLITUS WITHOUT COMPLICATION, UNSPECIFIED WHETHER LONG TERM INSULIN USE (HCC): ICD-10-CM

## 2020-06-04 PROCEDURE — 99213 OFFICE O/P EST LOW 20 MIN: CPT | Performed by: NURSE PRACTITIONER

## 2020-06-04 NOTE — H&P (VIEW-ONLY)
Patient Care Team:  Gayatri Aquino MD as PCP - General  Gayatri Aquino MD as PCP - Family Medicine    Reason for Visit: Surgical Weight Loss (Visit 4)      Subjective        Harry Panda is a 39 y.o. year old female who is here today for follow-up and continued medical management of morbid obesity. Her current Body mass index is 43.08 kg/m². She states she suffers from high blood pressure, high cholesterol, fatty liver disease, diabetes, polycystic ovarian syndrome, and morbid obesity due to weight gain. She is currently following the 4 meals/day diet prescription. Harry Panda previously agreed to incorporate the prescription as provided, while also increasing physical exercise. Patient states she has been successful at eating 4 meals/day, limiting carbohydrates after lunch, avoiding sodas, and getting getting adequate water and protein intake. She has been exercising by walking, using the elliptical, and doing yard work 3-4 days/week for 30 minutes. Harry Panda also states she has been drinking 64+ ounces of water and getting 50-65 grams of protein intake per day. She has lost 1 lb of weight since her last visit.    Review of Systems   Constitutional: Positive for fatigue.   Eyes: Negative.    Respiratory: Negative.    Cardiovascular: Negative.    Gastrointestinal: Negative.    Endocrine: Negative.    Genitourinary: Negative.    Musculoskeletal: Positive for arthralgias and back pain.   Skin: Positive for rash.   Neurological: Positive for numbness and headaches.   Hematological: Negative.    Psychiatric/Behavioral: The patient is nervous/anxious.         History  Past Medical History:   Diagnosis Date   • Anxiety    • Cholelithiasis 2004   • Chronic fatigue    • Diabetes mellitus (CMS/HCC)    • High cholesterol    • Hypertension 1999   • PCOS (polycystic ovarian syndrome)    • PONV (postoperative nausea and vomiting) 2004   • Rheumatoid arthritis (CMS/HCC)      Past Surgical History:   Procedure  Laterality Date   • CHOLECYSTECTOMY      lap   • WISDOM TOOTH EXTRACTION       Family History   Problem Relation Age of Onset   • Cancer Mother    • Hypertension Mother    • Obesity Mother    • Diabetes Mother    • Colon cancer Mother    • COPD Father    • Diabetes Father    • Obesity Father    • Hypertension Father    • Arthritis Father    • Depression Father    • Hearing loss Father    • Breast cancer Neg Hx    • Ovarian cancer Neg Hx    • Uterine cancer Neg Hx      Social History     Tobacco Use   • Smoking status: Former Smoker     Packs/day: 0.50     Years: 8.00     Pack years: 4.00     Types: Cigarettes     Start date: 1999     Last attempt to quit: 3/15/2006     Years since quittin.2   • Smokeless tobacco: Never Used   Substance Use Topics   • Alcohol use: Not Currently     Frequency: Never   • Drug use: Never       (Not in a hospital admission)  Allergies:  Penicillins      Current Outpatient Medications:   •  hydroCHLOROthiazide (HYDRODIURIL) 12.5 MG tablet, Take 1 tablet by mouth every day with olmesartan, Disp: 120 tablet, Rfl: 0  •  Insulin Glargine, 1 Unit Dial, (TOUJEO) 300 UNIT/ML solution pen-injector injection, Inject 50 Units into the skin nightly, Disp: 6 mL, Rfl: 3  •  Insulin Pen Needle 32G X 4 MM misc, Use one daily, Disp: 100 each, Rfl: 3  •  Liraglutide (VICTOZA) 18 MG/3ML solution pen-injector injection, Inject 1.8 mg into the skin daily, Disp: 6 pen, Rfl: 3  •  nebivolol (BYSTOLIC) 10 MG tablet, Take 1 tablet by mouth every morning, Disp: 30 tablet, Rfl: 5  •  olmesartan (BENICAR) 40 MG tablet, Take 1 tablet by mouth every day with hydrochlorothiazide. Replaces combo tablet, Disp: 120 tablet, Rfl: 0  •  rosuvastatin (CRESTOR) 40 MG tablet, Take 1 tablet by mouth daily, Disp: 90 tablet, Rfl: 1  •  vilazodone (VIIBRYD) 20 MG tablet tablet, Take 1 tablet by mouth daily, Disp: 30 tablet, Rfl: 2  •  Dulaglutide 1.5 MG/0.5ML solution pen-injector, Inject 1.5 mg into the skin once a  week, Disp: 2 mL, Rfl: 3  •  olmesartan-hydrochlorothiazide (BENICAR HCT) 40-12.5 MG per tablet, Take 1 tablet by mouth daily, Disp: 30 tablet, Rfl: 5  •  rosuvastatin (CRESTOR) 40 MG tablet, Take 1 tablet by mouth daily, Disp: 90 tablet, Rfl: 1    Objective     Vital Signs  Temp:  [98.2 °F (36.8 °C)] 98.2 °F (36.8 °C)  Heart Rate:  [87] 87  BP: (140)/(85) 140/85  Body mass index is 43.08 kg/m².      06/04/20  0822   Weight: 113 kg (249 lb)     Physical Exam   Constitutional: She is oriented to person, place, and time. She appears well-developed and well-nourished.   HENT:   Head: Normocephalic and atraumatic.   Eyes: Pupils are equal, round, and reactive to light. EOM are normal.   Neck: Normal range of motion. Neck supple.   Cardiovascular: Normal rate and regular rhythm.   Pulmonary/Chest: Effort normal and breath sounds normal.   Abdominal: Bowel sounds are normal.   Musculoskeletal: Normal range of motion.   Neurological: She is alert and oriented to person, place, and time.   Skin: Skin is warm and dry.   Psychiatric: She has a normal mood and affect. Her behavior is normal.       Results Review:   None      Assessment/Plan   Encounter Diagnoses   Name Primary?   • Class 3 severe obesity due to excess calories with serious comorbidity and body mass index (BMI) of 40.0 to 44.9 in adult (CMS/[facility]) Yes   • High cholesterol    • Essential hypertension    • Type 2 diabetes mellitus without complication, unspecified whether long term insulin use (CMS/[facility])    • Polycystic ovarian syndrome    • Encounter for other preprocedural examination          1. Harry Panda was seen today for follow-up, obesity, nutrition counseling and weight loss. She has lost 1 lb of weight since her last visit, making her Body mass index is 43.08 kg/m².. Today we discussed consistency with healthy changes in lifestyle, diet, and exercise for long term success. Harry Panda had received handouts to her explaining the recommendation on  portion sizes/appetite control/reading nutrition labels. Intensive behavioral therapy for obesity was done today as well.    Goals for this month are: attend EGD as planned, get EKG as soon as possible, and continue to work towards following the meal prescription as provided focusing on eating 4 meals/day with vegetables at every meal, eliminating carbohydrates after lunch, and getting adequate water and protein intake. Patient encouraged to call with questions and/or struggles as they may arise prior to next scheduled appointment.    2. Current comorbid conditions of high cholesterol, hypertension, diabetes, and PCOS associated with her morbid obesity are reported to be stable on her current treatment regimen and medications. We anticipate the comorbid conditions to improve as we address her morbid obesity.    3. Pre-procedural Examination - EKG, Psychology evaluation and nutrition counseling ordered today.    A total of 15 minutes was spent face to face with this patient and over half of the time was spent on counseling and coordination of care for the disease of obesity. We specifically reviewed the dietary prescription and I made recommendations toward increasing exercise as tolerated as well as focusing on training their behavior toward storing less.    Pre-op testing:    Seminar - Completed  EGD - Ordered, but not yet completed  H. Pylori - Will be done with EGD   H. Pylori Stool -  N/A    Psychological Evaluation - Ordered, but not yet completed    EKG - Ordered, but not yet completed    Cardiology - If EKG abnormal     TSH - Normal  Nicotine - N/A    Pulmonary Clearance - N/A   Drug Tests - N/A    Dietitian - Completed     Follow up in 1 month for a weight recheck.    MARKUS Lopez    06/04/20  10:44  Patient Care Team:  Gayatri Aquino MD as PCP - General  Gayatri Aquino MD as PCP - Family Medicine

## 2020-06-04 NOTE — PROGRESS NOTES
Patient Care Team:  Gayatri Aquino MD as PCP - General  Gayatri Aquino MD as PCP - Family Medicine    Reason for Visit: Surgical Weight Loss (Visit 4)      Subjective        Harry Panda is a 39 y.o. year old female who is here today for follow-up and continued medical management of morbid obesity. Her current Body mass index is 43.08 kg/m². She states she suffers from high blood pressure, high cholesterol, fatty liver disease, diabetes, polycystic ovarian syndrome, and morbid obesity due to weight gain. She is currently following the 4 meals/day diet prescription. Harry Panda previously agreed to incorporate the prescription as provided, while also increasing physical exercise. Patient states she has been successful at eating 4 meals/day, limiting carbohydrates after lunch, avoiding sodas, and getting getting adequate water and protein intake. She has been exercising by walking, using the elliptical, and doing yard work 3-4 days/week for 30 minutes. Harry Panda also states she has been drinking 64+ ounces of water and getting 50-65 grams of protein intake per day. She has lost 1 lb of weight since her last visit.    Review of Systems   Constitutional: Positive for fatigue.   Eyes: Negative.    Respiratory: Negative.    Cardiovascular: Negative.    Gastrointestinal: Negative.    Endocrine: Negative.    Genitourinary: Negative.    Musculoskeletal: Positive for arthralgias and back pain.   Skin: Positive for rash.   Neurological: Positive for numbness and headaches.   Hematological: Negative.    Psychiatric/Behavioral: The patient is nervous/anxious.         History  Past Medical History:   Diagnosis Date   • Anxiety    • Cholelithiasis 2004   • Chronic fatigue    • Diabetes mellitus (CMS/HCC)    • High cholesterol    • Hypertension 1999   • PCOS (polycystic ovarian syndrome)    • PONV (postoperative nausea and vomiting) 2004   • Rheumatoid arthritis (CMS/HCC)      Past Surgical History:   Procedure  Laterality Date   • CHOLECYSTECTOMY      lap   • WISDOM TOOTH EXTRACTION       Family History   Problem Relation Age of Onset   • Cancer Mother    • Hypertension Mother    • Obesity Mother    • Diabetes Mother    • Colon cancer Mother    • COPD Father    • Diabetes Father    • Obesity Father    • Hypertension Father    • Arthritis Father    • Depression Father    • Hearing loss Father    • Breast cancer Neg Hx    • Ovarian cancer Neg Hx    • Uterine cancer Neg Hx      Social History     Tobacco Use   • Smoking status: Former Smoker     Packs/day: 0.50     Years: 8.00     Pack years: 4.00     Types: Cigarettes     Start date: 1999     Last attempt to quit: 3/15/2006     Years since quittin.2   • Smokeless tobacco: Never Used   Substance Use Topics   • Alcohol use: Not Currently     Frequency: Never   • Drug use: Never       (Not in a hospital admission)  Allergies:  Penicillins      Current Outpatient Medications:   •  hydroCHLOROthiazide (HYDRODIURIL) 12.5 MG tablet, Take 1 tablet by mouth every day with olmesartan, Disp: 120 tablet, Rfl: 0  •  Insulin Glargine, 1 Unit Dial, (TOUJEO) 300 UNIT/ML solution pen-injector injection, Inject 50 Units into the skin nightly, Disp: 6 mL, Rfl: 3  •  Insulin Pen Needle 32G X 4 MM misc, Use one daily, Disp: 100 each, Rfl: 3  •  Liraglutide (VICTOZA) 18 MG/3ML solution pen-injector injection, Inject 1.8 mg into the skin daily, Disp: 6 pen, Rfl: 3  •  nebivolol (BYSTOLIC) 10 MG tablet, Take 1 tablet by mouth every morning, Disp: 30 tablet, Rfl: 5  •  olmesartan (BENICAR) 40 MG tablet, Take 1 tablet by mouth every day with hydrochlorothiazide. Replaces combo tablet, Disp: 120 tablet, Rfl: 0  •  rosuvastatin (CRESTOR) 40 MG tablet, Take 1 tablet by mouth daily, Disp: 90 tablet, Rfl: 1  •  vilazodone (VIIBRYD) 20 MG tablet tablet, Take 1 tablet by mouth daily, Disp: 30 tablet, Rfl: 2  •  Dulaglutide 1.5 MG/0.5ML solution pen-injector, Inject 1.5 mg into the skin once a  week, Disp: 2 mL, Rfl: 3  •  olmesartan-hydrochlorothiazide (BENICAR HCT) 40-12.5 MG per tablet, Take 1 tablet by mouth daily, Disp: 30 tablet, Rfl: 5  •  rosuvastatin (CRESTOR) 40 MG tablet, Take 1 tablet by mouth daily, Disp: 90 tablet, Rfl: 1    Objective     Vital Signs  Temp:  [98.2 °F (36.8 °C)] 98.2 °F (36.8 °C)  Heart Rate:  [87] 87  BP: (140)/(85) 140/85  Body mass index is 43.08 kg/m².      06/04/20  0822   Weight: 113 kg (249 lb)     Physical Exam   Constitutional: She is oriented to person, place, and time. She appears well-developed and well-nourished.   HENT:   Head: Normocephalic and atraumatic.   Eyes: Pupils are equal, round, and reactive to light. EOM are normal.   Neck: Normal range of motion. Neck supple.   Cardiovascular: Normal rate and regular rhythm.   Pulmonary/Chest: Effort normal and breath sounds normal.   Abdominal: Bowel sounds are normal.   Musculoskeletal: Normal range of motion.   Neurological: She is alert and oriented to person, place, and time.   Skin: Skin is warm and dry.   Psychiatric: She has a normal mood and affect. Her behavior is normal.       Results Review:   None      Assessment/Plan   Encounter Diagnoses   Name Primary?   • Class 3 severe obesity due to excess calories with serious comorbidity and body mass index (BMI) of 40.0 to 44.9 in adult (CMS/[facility]) Yes   • High cholesterol    • Essential hypertension    • Type 2 diabetes mellitus without complication, unspecified whether long term insulin use (CMS/[facility])    • Polycystic ovarian syndrome    • Encounter for other preprocedural examination          1. Harry Panda was seen today for follow-up, obesity, nutrition counseling and weight loss. She has lost 1 lb of weight since her last visit, making her Body mass index is 43.08 kg/m².. Today we discussed consistency with healthy changes in lifestyle, diet, and exercise for long term success. Harry Panda had received handouts to her explaining the recommendation on  portion sizes/appetite control/reading nutrition labels. Intensive behavioral therapy for obesity was done today as well.    Goals for this month are: attend EGD as planned, get EKG as soon as possible, and continue to work towards following the meal prescription as provided focusing on eating 4 meals/day with vegetables at every meal, eliminating carbohydrates after lunch, and getting adequate water and protein intake. Patient encouraged to call with questions and/or struggles as they may arise prior to next scheduled appointment.    2. Current comorbid conditions of high cholesterol, hypertension, diabetes, and PCOS associated with her morbid obesity are reported to be stable on her current treatment regimen and medications. We anticipate the comorbid conditions to improve as we address her morbid obesity.    3. Pre-procedural Examination - EKG, Psychology evaluation and nutrition counseling ordered today.    A total of 15 minutes was spent face to face with this patient and over half of the time was spent on counseling and coordination of care for the disease of obesity. We specifically reviewed the dietary prescription and I made recommendations toward increasing exercise as tolerated as well as focusing on training their behavior toward storing less.    Pre-op testing:    Seminar - Completed  EGD - Ordered, but not yet completed  H. Pylori - Will be done with EGD   H. Pylori Stool -  N/A    Psychological Evaluation - Ordered, but not yet completed    EKG - Ordered, but not yet completed    Cardiology - If EKG abnormal     TSH - Normal  Nicotine - N/A    Pulmonary Clearance - N/A   Drug Tests - N/A    Dietitian - Completed     Follow up in 1 month for a weight recheck.    MARKUS Lopez    06/04/20  10:44  Patient Care Team:  Gayatri Aquino MD as PCP - General  Gayatri Aquino MD as PCP - Family Medicine

## 2020-06-04 NOTE — PROGRESS NOTES
Nutrition Services    Patient Name:  Harry Panda  YOB: 1980  MRN: [id number]  Admit Date:  (Not on file)    NUTRITION BARIATRIC/[facility] NOTE     Visit4  Initial Assessment-1st visit with RD  [facility]    Anthropometrics   Height: 63.75 in  Weight: 249 lbs  BMI: 43.1    Nutrition Recall  Eating ___4___ meals daily   Meeting protein daily goal  Making healthier choices  Large portions  Monitoring portions   Drinking carbonated beverages-none  Drinking greater to or equal to 64 fluid ounces-At least 4 16.9 oz bottles of water per day    Education    4 meal per day sample menu  Reinforce Nutritional Needs for [facility]    Nutrition Goals   Continue diet changes  Eat _4____ meals per day with protein  Eat protein first at meals  Protein goal: 65gms   Healthier food choices  Portion control / Use smaller plate or measuring cup   Increase fluid intake to 64 ounces per day-To continue        Electronically signed by:  Thelma Celis  06/04/20 09:46

## 2020-06-05 ENCOUNTER — HOSPITAL ENCOUNTER (OUTPATIENT)
Dept: CARDIOLOGY | Facility: HOSPITAL | Age: 40
Discharge: HOME | End: 2020-06-05

## 2020-06-05 ENCOUNTER — HOSPITAL ENCOUNTER (OUTPATIENT)
Dept: MAMMOGRAPHY | Facility: HOSPITAL | Age: 40
Discharge: HOME | End: 2020-06-05
Admitting: NURSE PRACTITIONER

## 2020-06-05 DIAGNOSIS — E66.01 CLASS 3 SEVERE OBESITY DUE TO EXCESS CALORIES WITH SERIOUS COMORBIDITY AND BODY MASS INDEX (BMI) OF 40.0 TO 44.9 IN ADULT (HCC): ICD-10-CM

## 2020-06-05 DIAGNOSIS — Z01.818 ENCOUNTER FOR OTHER PREPROCEDURAL EXAMINATION: ICD-10-CM

## 2020-06-05 DIAGNOSIS — I10 ESSENTIAL HYPERTENSION: ICD-10-CM

## 2020-06-05 DIAGNOSIS — Z12.31 ENCOUNTER FOR SCREENING MAMMOGRAM FOR MALIGNANT NEOPLASM OF BREAST: ICD-10-CM

## 2020-06-05 PROCEDURE — 77063 BREAST TOMOSYNTHESIS BI: CPT

## 2020-06-05 PROCEDURE — 93010 ELECTROCARDIOGRAM REPORT: CPT | Performed by: INTERNAL MEDICINE

## 2020-06-05 PROCEDURE — 77067 SCR MAMMO BI INCL CAD: CPT

## 2020-06-05 PROCEDURE — 93005 ELECTROCARDIOGRAM TRACING: CPT | Performed by: NURSE PRACTITIONER

## 2020-06-11 ENCOUNTER — TRANSCRIBE ORDERS (OUTPATIENT)
Dept: ADMINISTRATIVE | Facility: HOSPITAL | Age: 40
End: 2020-06-11

## 2020-06-11 DIAGNOSIS — Z01.818 PRE-OP TESTING: Primary | ICD-10-CM

## 2020-06-16 ENCOUNTER — LAB (OUTPATIENT)
Dept: LAB | Facility: HOSPITAL | Age: 40
End: 2020-06-16

## 2020-06-16 PROCEDURE — U0003 INFECTIOUS AGENT DETECTION BY NUCLEIC ACID (DNA OR RNA); SEVERE ACUTE RESPIRATORY SYNDROME CORONAVIRUS 2 (SARS-COV-2) (CORONAVIRUS DISEASE [COVID-19]), AMPLIFIED PROBE TECHNIQUE, MAKING USE OF HIGH THROUGHPUT TECHNOLOGIES AS DESCRIBED BY CMS-2020-01-R: HCPCS | Performed by: SURGERY

## 2020-06-17 ENCOUNTER — TELEPHONE (OUTPATIENT)
Dept: BARIATRICS/WEIGHT MGMT | Facility: CLINIC | Age: 40
End: 2020-06-17

## 2020-06-17 LAB
COVID LABCORP PRIORITY: NORMAL
SARS-COV-2 RNA RESP QL NAA+PROBE: NOT DETECTED

## 2020-06-17 NOTE — TELEPHONE ENCOUNTER
Patient notified of their EGD procedure with Dr Marlow     BA Process-EGD Reminder/Instructions       Called to remind the patient of his procedure with Dr Marlow tomorrow 06/19/2020 EGD.      Instructions:     1) Eat normal the day before your procedure until 8 p.m. in the evening.    2) Beginning at 8pm clear liquids only-no Red or Pink in Color   3) Nothing to eat or drink after midnight.  4) Bring a list of medications.   5) Advised that they must bring a  as that IV sedation is being used.

## 2020-06-18 RX ORDER — OLMESARTAN MEDOXOMIL 40 MG/1
TABLET ORAL
Qty: 120 TABLET | Refills: 0 | Status: CANCELLED | OUTPATIENT
Start: 2020-06-18

## 2020-06-19 ENCOUNTER — ANESTHESIA (OUTPATIENT)
Dept: GASTROENTEROLOGY | Facility: HOSPITAL | Age: 40
End: 2020-06-19

## 2020-06-19 ENCOUNTER — HOSPITAL ENCOUNTER (OUTPATIENT)
Facility: HOSPITAL | Age: 40
Setting detail: HOSPITAL OUTPATIENT SURGERY
Discharge: HOME | End: 2020-06-19
Attending: SURGERY | Admitting: SURGERY

## 2020-06-19 ENCOUNTER — ANESTHESIA EVENT (OUTPATIENT)
Dept: GASTROENTEROLOGY | Facility: HOSPITAL | Age: 40
End: 2020-06-19

## 2020-06-19 VITALS
TEMPERATURE: 96.9 F | BODY MASS INDEX: 43.23 KG/M2 | DIASTOLIC BLOOD PRESSURE: 80 MMHG | SYSTOLIC BLOOD PRESSURE: 111 MMHG | OXYGEN SATURATION: 96 % | RESPIRATION RATE: 20 BRPM | HEART RATE: 71 BPM | HEIGHT: 63 IN | WEIGHT: 244 LBS

## 2020-06-19 DIAGNOSIS — Z01.818 PREOP EXAMINATION: ICD-10-CM

## 2020-06-19 DIAGNOSIS — E66.01 CLASS 3 SEVERE OBESITY DUE TO EXCESS CALORIES WITH SERIOUS COMORBIDITY AND BODY MASS INDEX (BMI) OF 40.0 TO 44.9 IN ADULT (HCC): ICD-10-CM

## 2020-06-19 PROBLEM — K31.7 GASTRIC POLYP: Status: ACTIVE | Noted: 2020-06-19

## 2020-06-19 LAB
B-HCG UR QL: NEGATIVE
GLUCOSE BLDC GLUCOMTR-MCNC: 160 MG/DL (ref 70–130)

## 2020-06-19 PROCEDURE — 88305 TISSUE EXAM BY PATHOLOGIST: CPT | Performed by: SURGERY

## 2020-06-19 PROCEDURE — 82962 GLUCOSE BLOOD TEST: CPT

## 2020-06-19 PROCEDURE — 43239 EGD BIOPSY SINGLE/MULTIPLE: CPT | Performed by: SURGERY

## 2020-06-19 PROCEDURE — 87081 CULTURE SCREEN ONLY: CPT | Performed by: SURGERY

## 2020-06-19 PROCEDURE — 25010000002 PROPOFOL 10 MG/ML EMULSION: Performed by: NURSE ANESTHETIST, CERTIFIED REGISTERED

## 2020-06-19 PROCEDURE — 43251 EGD REMOVE LESION SNARE: CPT | Performed by: SURGERY

## 2020-06-19 PROCEDURE — 81025 URINE PREGNANCY TEST: CPT | Performed by: ANESTHESIOLOGY

## 2020-06-19 RX ORDER — PANTOPRAZOLE SODIUM 40 MG/1
40 TABLET, DELAYED RELEASE ORAL DAILY
Qty: 30 TABLET | Refills: 1 | Status: SHIPPED | OUTPATIENT
Start: 2020-06-19 | End: 2021-03-29 | Stop reason: SDUPTHER

## 2020-06-19 RX ORDER — SODIUM CHLORIDE 0.9 % (FLUSH) 0.9 %
10 SYRINGE (ML) INJECTION AS NEEDED
Status: DISCONTINUED | OUTPATIENT
Start: 2020-06-19 | End: 2020-06-19 | Stop reason: HOSPADM

## 2020-06-19 RX ORDER — UBIDECARENONE 100 MG
100 CAPSULE ORAL DAILY
COMMUNITY
End: 2022-02-07

## 2020-06-19 RX ORDER — LIDOCAINE HYDROCHLORIDE 10 MG/ML
0.5 INJECTION, SOLUTION EPIDURAL; INFILTRATION; INTRACAUDAL; PERINEURAL ONCE AS NEEDED
Status: DISCONTINUED | OUTPATIENT
Start: 2020-06-19 | End: 2020-06-19 | Stop reason: HOSPADM

## 2020-06-19 RX ORDER — MULTIPLE VITAMINS W/ MINERALS TAB 9MG-400MCG
1 TAB ORAL DAILY
COMMUNITY
End: 2021-12-09 | Stop reason: HOSPADM

## 2020-06-19 RX ORDER — MELATONIN
1000 DAILY
COMMUNITY
End: 2022-02-07

## 2020-06-19 RX ORDER — CYANOCOBALAMIN (VITAMIN B-12) 500 MCG
1 TABLET ORAL DAILY
COMMUNITY

## 2020-06-19 RX ORDER — PROPOFOL 10 MG/ML
VIAL (ML) INTRAVENOUS AS NEEDED
Status: DISCONTINUED | OUTPATIENT
Start: 2020-06-19 | End: 2020-06-19 | Stop reason: SURG

## 2020-06-19 RX ORDER — SODIUM CHLORIDE 9 MG/ML
500 INJECTION, SOLUTION INTRAVENOUS CONTINUOUS PRN
Status: DISCONTINUED | OUTPATIENT
Start: 2020-06-19 | End: 2020-06-19 | Stop reason: HOSPADM

## 2020-06-19 RX ADMIN — PROPOFOL 50 MG: 10 INJECTION, EMULSION INTRAVENOUS at 09:33

## 2020-06-19 RX ADMIN — SODIUM CHLORIDE 500 ML: 9 INJECTION, SOLUTION INTRAVENOUS at 09:16

## 2020-06-19 RX ADMIN — PROPOFOL 50 MG: 10 INJECTION, EMULSION INTRAVENOUS at 09:31

## 2020-06-19 RX ADMIN — PROPOFOL 50 MG: 10 INJECTION, EMULSION INTRAVENOUS at 09:36

## 2020-06-19 RX ADMIN — LIDOCAINE HYDROCHLORIDE 100 MG: 20 INJECTION, SOLUTION INTRAVENOUS at 09:30

## 2020-06-19 RX ADMIN — PROPOFOL 50 MG: 10 INJECTION, EMULSION INTRAVENOUS at 09:39

## 2020-06-19 RX ADMIN — PROPOFOL 70 MG: 10 INJECTION, EMULSION INTRAVENOUS at 09:30

## 2020-06-19 RX ADMIN — PROPOFOL 50 MG: 10 INJECTION, EMULSION INTRAVENOUS at 09:32

## 2020-06-19 NOTE — BRIEF OP NOTE
ESOPHAGOGASTRODUODENOSCOPY WITH ANESTHESIA  Progress Note    Harry AMINA Panda  6/19/2020    Pre-op Diagnosis:   Class 3 severe obesity due to excess calories with serious comorbidity and body mass index (BMI) of 40.0 to 44.9 in adult (CMS/[facility]) [E66.01, Z68.41]  Preop examination [Z01.818]       Post-Op Diagnosis Codes:     * Class 3 severe obesity due to excess calories with serious comorbidity and body mass index (BMI) of 40.0 to 44.9 in adult (CMS/[facility]) [E66.01, Z68.41]     * Preop examination [Z01.818]     * Gastric polyps [K31.7]    Procedure/CPT® Codes:      Procedure(s):  ESOPHAGOGASTRODUODENOSCOPY WITH ANESTHESIA  Polypectomy      Surgeon(s):  Kermit Marlow MD    Anesthesia: Monitored Anesthesia Care    Staff:   Endo Technician: Niharika Fuentes  Endo Nurse: Arcelia Curiel RN    Estimated Blood Loss: minimal    Urine Voided: * No values recorded between 6/19/2020  9:20 AM and 6/19/2020  9:41 AM *    Specimens:                Specimens     ID Source Type Tests Collected By Collected At Frozen?      1 Stomach Tissue · UREASE FOR H PYLORI, 24 HR   Kermit Marlow MD 6/19/20 0929      Description: chan    A Stomach Polyp · TISSUE PATHOLOGY EXAM   Kermit Marlow MD 6/19/20 0931      Description: gastric polyp             Findings: as above polyp    Complications: none      Kermit Marlow MD     Date: 6/19/2020  Time: 09:41

## 2020-06-19 NOTE — ANESTHESIA PREPROCEDURE EVALUATION
Anesthesia Evaluation     Patient summary reviewed and Nursing notes reviewed   NPO Solid Status: > 8 hours  NPO Liquid Status: > 8 hours           Airway   Mallampati: II  No difficulty expected  Dental - normal exam     Pulmonary    (-) asthma, not a smoker  Cardiovascular   Exercise tolerance: good (4-7 METS)    (+) hypertension, hyperlipidemia,       Neuro/Psych  (+) psychiatric history Anxiety,     GI/Hepatic/Renal/Endo    (+) obesity,   diabetes mellitus,   (-) liver disease, no renal disease    Musculoskeletal (-) negative ROS    Abdominal    Substance History - negative use     OB/GYN          Other - negative ROS                       Anesthesia Plan    ASA 3     MAC     intravenous induction     Anesthetic plan, all risks, benefits, and alternatives have been provided, discussed and informed consent has been obtained with: patient.

## 2020-06-19 NOTE — ANESTHESIA POSTPROCEDURE EVALUATION
Patient: Harry Panda    Procedure Summary     Date:  06/19/20 Room / Location:  [facility] ENDOSCOPY 6 / [facility] ENDOSCOPY    Anesthesia Start:  0922 Anesthesia Stop:  0944    Procedure:  ESOPHAGOGASTRODUODENOSCOPY WITH ANESTHESIA (N/A ) Diagnosis:       Class 3 severe obesity due to excess calories with serious comorbidity and body mass index (BMI) of 40.0 to 44.9 in adult (CMS/[facility])      Preop examination      Gastric polyps      (Class 3 severe obesity due to excess calories with serious comorbidity and body mass index (BMI) of 40.0 to 44.9 in adult (CMS/[facility]) [E66.01, Z68.41])      (Preop examination [Z01.818])    Surgeon:  Kermit Marlow MD Provider:  Seamus Marlow CRNA    Anesthesia Type:  MAC ASA Status:  3          Anesthesia Type: MAC    Vitals  No vitals data found for the desired time range.          Post Anesthesia Care and Evaluation    Patient location during evaluation: PHASE II  Patient participation: complete - patient participated  Level of consciousness: awake  Pain score: 0  Pain management: adequate  Airway patency: patent  Anesthetic complications: No anesthetic complications  PONV Status: none  Cardiovascular status: acceptable  Respiratory status: acceptable  Hydration status: acceptable

## 2020-06-20 LAB — UREASE TISS QL: NEGATIVE

## 2020-06-22 LAB
LAB AP CASE REPORT: NORMAL
LAB AP DIAGNOSIS COMMENT: NORMAL
PATH REPORT.FINAL DX SPEC: NORMAL
PATH REPORT.GROSS SPEC: NORMAL

## 2020-06-24 ENCOUNTER — TELEPHONE (OUTPATIENT)
Dept: BARIATRICS/WEIGHT MGMT | Facility: CLINIC | Age: 40
End: 2020-06-24

## 2020-06-24 NOTE — TELEPHONE ENCOUNTER
The patient on 6/24/2020 about her pathological findings and I have recommended that she have a repeat upper endoscopy presumably at the time of her surgical procedure.  I explained that if there is any abnormalities found during her surgical procedure concerning of disease her surgical procedure would be aborted for the sleeve gastrectomy.

## 2020-06-26 ENCOUNTER — OFFICE VISIT (OUTPATIENT)
Dept: OBSTETRICS AND GYNECOLOGY | Facility: CLINIC | Age: 40
End: 2020-06-26

## 2020-06-26 VITALS
SYSTOLIC BLOOD PRESSURE: 120 MMHG | WEIGHT: 237 LBS | HEIGHT: 63 IN | BODY MASS INDEX: 41.99 KG/M2 | DIASTOLIC BLOOD PRESSURE: 80 MMHG

## 2020-06-26 DIAGNOSIS — R10.2 PELVIC PAIN: Primary | ICD-10-CM

## 2020-06-26 DIAGNOSIS — Z87.891 FORMER SMOKER: ICD-10-CM

## 2020-06-26 PROCEDURE — 99213 OFFICE O/P EST LOW 20 MIN: CPT | Performed by: NURSE PRACTITIONER

## 2020-06-26 RX ORDER — NORETHINDRONE ACETATE AND ETHINYL ESTRADIOL 1MG-20(21)
1 KIT ORAL DAILY
Qty: 84 TABLET | Refills: 0 | Status: SHIPPED | OUTPATIENT
Start: 2020-06-26 | End: 2020-10-12 | Stop reason: ALTCHOICE

## 2020-07-01 ENCOUNTER — TELEPHONE (OUTPATIENT)
Dept: BARIATRICS/WEIGHT MGMT | Facility: CLINIC | Age: 40
End: 2020-07-01

## 2020-07-01 NOTE — PATIENT INSTRUCTIONS

## 2020-07-02 ENCOUNTER — TELEMEDICINE (OUTPATIENT)
Dept: BARIATRICS/WEIGHT MGMT | Facility: CLINIC | Age: 40
End: 2020-07-02

## 2020-07-02 VITALS — HEIGHT: 63 IN | WEIGHT: 245 LBS | BODY MASS INDEX: 43.41 KG/M2

## 2020-07-02 DIAGNOSIS — E28.2 POLYCYSTIC OVARIAN SYNDROME: ICD-10-CM

## 2020-07-02 DIAGNOSIS — E66.01 CLASS 3 SEVERE OBESITY DUE TO EXCESS CALORIES WITH SERIOUS COMORBIDITY AND BODY MASS INDEX (BMI) OF 40.0 TO 44.9 IN ADULT (HCC): Primary | ICD-10-CM

## 2020-07-02 DIAGNOSIS — E11.9 TYPE 2 DIABETES MELLITUS WITHOUT COMPLICATION, WITHOUT LONG-TERM CURRENT USE OF INSULIN (HCC): ICD-10-CM

## 2020-07-02 DIAGNOSIS — I10 ESSENTIAL HYPERTENSION: ICD-10-CM

## 2020-07-02 DIAGNOSIS — E78.00 HIGH CHOLESTEROL: ICD-10-CM

## 2020-07-02 PROCEDURE — 99442 PR PHYS/QHP TELEPHONE EVALUATION 11-20 MIN: CPT | Performed by: NURSE PRACTITIONER

## 2020-07-02 NOTE — PROGRESS NOTES
Patient Care Team:  Gayatri Aquino MD as PCP - General  Gayatri Aquino MD as PCP - Family Medicine    Reason for Visit: Surgical Weight Loss (Visit 5)    Type of Visit: Video Visit  Provider Location: [facility] [place] [facility]  Patient Location: Home      Subjective        Harry Panda is a 39 y.o. year old female who is attending a video visit today for follow-up and continued medical management of morbid obesity. Her current Body mass index is 43.4 kg/m². She states she suffers from high blood pressure, high cholesterol, diabetes, polycystic ovarian syndrome, and morbid obesity due to weight gain. She is currently following the 4 meals/day diet prescription. Harry Panda previously agreed to incorporate the prescription as provided, while also increasing physical exercise. Patient states she has been successful at eating 4 meals/day and getting adequate water and protein intake. Struggles to get vegetables with every meal, denice breakfast, and eating carbohydrates such as fruit after lunch. She has not been exercising but has been walking and gardening. Harry Panda also states she has been drinking 64 ounces of water and getting 65 grams of protein intake per day. She has lost 4 lbs of weight since her last visit.    Review of Systems   Constitutional: Positive for fatigue.   Eyes:        Wears glasses   Respiratory: Negative.    Cardiovascular: Negative.    Gastrointestinal: Negative.    Endocrine: Negative.    Genitourinary: Negative.    Musculoskeletal: Positive for arthralgias and back pain.   Skin: Positive for rash.   Neurological: Positive for numbness and headaches.   Hematological: Negative.    Psychiatric/Behavioral: The patient is nervous/anxious.         History  Past Medical History:   Diagnosis Date   • Anxiety    • Cholelithiasis 2004   • Chronic fatigue    • Diabetes mellitus (CMS/HCC)    • High cholesterol    • Hypertension 1999   • PCOS (polycystic ovarian syndrome)    • PONV (postoperative  nausea and vomiting)    • Rheumatoid arthritis (CMS/HCC)      Past Surgical History:   Procedure Laterality Date   • CHOLECYSTECTOMY      lap   • ENDOSCOPY N/A 2020    Procedure: ESOPHAGOGASTRODUODENOSCOPY WITH ANESTHESIA;  Surgeon: Kermit Marlow MD;  Location: [facility] ENDOSCOPY;  Service: General;  Laterality: N/A;  pre: pre op scope  post op: gastritis, polyp  PCP: Gayatri Aquino MD   • WISDOM TOOTH EXTRACTION       Family History   Problem Relation Age of Onset   • Cancer Mother    • Hypertension Mother    • Obesity Mother    • Diabetes Mother    • Colon cancer Mother    • COPD Father    • Diabetes Father    • Obesity Father    • Hypertension Father    • Arthritis Father    • Depression Father    • Hearing loss Father    • Breast cancer Neg Hx    • Ovarian cancer Neg Hx    • Uterine cancer Neg Hx      Social History     Tobacco Use   • Smoking status: Former Smoker     Packs/day: 0.50     Years: 8.00     Pack years: 4.00     Types: Cigarettes     Start date: 1999     Last attempt to quit: 3/15/2006     Years since quittin.3   • Smokeless tobacco: Never Used   Substance Use Topics   • Alcohol use: Not Currently     Frequency: Never   • Drug use: Never       (Not in a hospital admission)  Allergies:  Penicillins      Current Outpatient Medications:   •  cholecalciferol (VITAMIN D3) 25 MCG (1000 UT) tablet, Take 1,000 Units by mouth Daily., Disp: , Rfl:   •  coenzyme Q10 100 MG capsule, Take 100 mg by mouth Daily., Disp: , Rfl:   •  Cyanocobalamin (VITAMIN B 12 PO), Take  by mouth., Disp: , Rfl:   •  hydroCHLOROthiazide (HYDRODIURIL) 12.5 MG tablet, Take 1 tablet by mouth every day with olmesartan, Disp: 120 tablet, Rfl: 0  •  Insulin Glargine, 1 Unit Dial, (TOUJEO) 300 UNIT/ML solution pen-injector injection, Inject 50 Units into the skin nightly, Disp: 6 mL, Rfl: 1  •  Insulin Pen Needle 32G X 4 MM misc, Use one daily, Disp: 100 each, Rfl: 3  •  Liraglutide (VICTOZA) 18 MG/3ML solution  pen-injector injection, Inject 1.8 mg into the skin daily, Disp: 6 pen, Rfl: 3  •  Multiple Vitamins-Minerals (MULTIVITAMIN WITH MINERALS) tablet tablet, Take 1 tablet by mouth Daily., Disp: , Rfl:   •  nebivolol (BYSTOLIC) 10 MG tablet, Take 1 tablet by mouth every morning, Disp: 30 tablet, Rfl: 5  •  norethindrone-ethinyl estradiol FE (Microgestin FE 1/20) 1-20 MG-MCG per tablet, Take 1 tablet by mouth Daily., Disp: 84 tablet, Rfl: 0  •  olmesartan (BENICAR) 40 MG tablet, Take 1 tablet by mouth every day with hydrochlorothiazide. Replaces combo tablet, Disp: 120 tablet, Rfl: 0  •  olmesartan-hydrochlorothiazide (BENICAR HCT) 40-12.5 MG per tablet, Take 1 tablet by mouth daily, Disp: 90 tablet, Rfl: 0  •  pantoprazole (PROTONIX) 40 MG EC tablet, Take 1 tablet by mouth Daily., Disp: 30 tablet, Rfl: 1  •  rosuvastatin (CRESTOR) 40 MG tablet, Take 1 tablet by mouth daily, Disp: 90 tablet, Rfl: 0  •  vilazodone (VIIBRYD) 20 MG tablet tablet, Take 1 tablet by mouth daily, Disp: 30 tablet, Rfl: 2    Objective     Vital Signs     Body mass index is 43.4 kg/m².      07/02/20  0832   Weight: 111 kg (245 lb)       Physical Exam   Constitutional: She appears well-developed and well-nourished.   HENT:   Head: Normocephalic and atraumatic.   Eyes: Conjunctivae and EOM are normal.   Wearing glasses   Neck: Neck normal appearance.  Pulmonary/Chest: Effort normal.   Abdominal:   Obese   Musculoskeletal: Normal range of motion.   Neurological: She is alert.   Skin: Skin is dry.   Psychiatric: She has a normal mood and affect.       Results Review:   None      Assessment/Plan   Encounter Diagnoses   Name Primary?   • Class 3 severe obesity due to excess calories with serious comorbidity and body mass index (BMI) of 40.0 to 44.9 in adult (CMS/[facility]) Yes   • Essential hypertension    • High cholesterol    • Polycystic ovarian syndrome    • Type 2 diabetes mellitus without complication, without long-term current use of insulin (CMS/[facility])           1. Harry Panda was seen today via video for follow-up, obesity, nutrition counseling and weight loss. She has lost 4 lbs of weight since her last visit, making her Body mass index is 43.4 kg/m².. Today we discussed consistency with healthy changes in lifestyle, diet, and exercise for long term success. Harry Panda had received handouts to her explaining the recommendation on portion sizes/appetite control/reading nutrition labels. Intensive behavioral therapy for obesity was done today as well.    Goals for this month are: attend psychology appt as scheduled and continue to work towards following the meal prescription as provided focusing on eating 4 meals/day with vegetables at every meal, eliminating carbohydrates after lunch, and getting adequate water and protein intake. Patient encouraged to call with questions and/or struggles as they may arise prior to next scheduled appointment.    2. Current comorbid conditions of hypertension, high cholesterol, PCOS, and diabetes associated with her morbid obesity are reported to be stable on her current treatment regimen and medications. We anticipate the comorbid conditions to improve as we address her morbid obesity.    A total of 15 minutes was spent face to face with this patient on video and over half of the time was spent on counseling and coordination of care for the disease of obesity. We specifically reviewed the dietary prescription and I made recommendations toward increasing exercise as tolerated as well as focusing on training their behavior toward storing less.    Pre-op testing:    Seminar - Completed  EGD - Completed and Normal  H. Pylori - Negative   H. Pylori Stool -  N/A    Psychological Evaluation - Ordered, but not yet completed    EKG - Normal   Cardiology - N/A     TSH - Normal  Nicotine - N/A    Pulmonary Clearance - N/A   Drug Tests - N/A    Dietitian - Completed    Follow up in 1 month for a weight recheck.    Melinda Taveras,  APRN    07/02/20  08:56  Patient Care Team:  Gayatri Aquino MD as PCP - General  Gayatri Aquino MD as PCP - Family Medicine

## 2020-07-14 RX ORDER — NEBIVOLOL 10 MG/1
TABLET ORAL
Qty: 30 TABLET | Refills: 5 | Status: SHIPPED | OUTPATIENT
Start: 2020-07-14 | End: 2021-01-19 | Stop reason: SDUPTHER

## 2020-08-03 ENCOUNTER — TELEMEDICINE (OUTPATIENT)
Dept: BARIATRICS/WEIGHT MGMT | Facility: CLINIC | Age: 40
End: 2020-08-03

## 2020-08-03 VITALS — HEIGHT: 63 IN | BODY MASS INDEX: 42.88 KG/M2 | WEIGHT: 242 LBS

## 2020-08-03 DIAGNOSIS — I10 ESSENTIAL HYPERTENSION: ICD-10-CM

## 2020-08-03 DIAGNOSIS — E11.9 TYPE 2 DIABETES MELLITUS WITHOUT COMPLICATION, UNSPECIFIED WHETHER LONG TERM INSULIN USE (HCC): ICD-10-CM

## 2020-08-03 DIAGNOSIS — E28.2 POLYCYSTIC OVARIAN SYNDROME: ICD-10-CM

## 2020-08-03 DIAGNOSIS — E78.00 HIGH CHOLESTEROL: ICD-10-CM

## 2020-08-03 DIAGNOSIS — E66.01 CLASS 3 SEVERE OBESITY DUE TO EXCESS CALORIES WITH SERIOUS COMORBIDITY AND BODY MASS INDEX (BMI) OF 40.0 TO 44.9 IN ADULT (HCC): Primary | ICD-10-CM

## 2020-08-03 PROCEDURE — 99213 OFFICE O/P EST LOW 20 MIN: CPT | Performed by: NURSE PRACTITIONER

## 2020-08-03 NOTE — PROGRESS NOTES
Patient Care Team:  Gayatri Aquino MD as PCP - General  Gayatri Aquino MD as PCP - Family Medicine    Reason for Visit: Surgical Weight Loss (Visit 6)    Type of Visit: Video Visit  Provider Location: [facility] [place] [facility]  Patient Location: Home      Subjective        Harry Panda is a 39 y.o. year old female who is attending a video visit today for follow-up and continued medical management of morbid obesity. Her current Body mass index is 42.87 kg/m². She states she suffers from high blood pressure, high cholesterol, diabetes, polycystic ovarian syndrome, and morbid obesity due to weight gain. She is currently following the 4 meals/day diet prescription. Harry Panda previously agreed to incorporate the prescription as provided, while also increasing physical exercise. Patient states she has been successful at eating 4 meals/day and getting, eliminating carbohydrates after lunch, and getting adequate water and protein intake. She has struggled to get vegetables with every meal, denice breakfast. She has not been exercising but has been walking and gardening. Harry Panda also states she has been drinking 64 ounces of water and getting 65 grams of protein intake per day. She has lost 3 lbs of weight since her last visit.    Review of Systems   Constitutional: Positive for fatigue.   Eyes: Negative.         Wears glasses   Respiratory: Negative.    Cardiovascular: Negative.    Gastrointestinal: Negative.    Endocrine: Negative.    Genitourinary: Negative.    Musculoskeletal: Positive for arthralgias and back pain.   Skin: Negative.    Neurological: Positive for numbness and headaches.   Hematological: Negative.    Psychiatric/Behavioral: The patient is nervous/anxious.         History  Past Medical History:   Diagnosis Date   • Anxiety    • Cholelithiasis 2004   • Chronic fatigue    • Diabetes mellitus (CMS/HCC)    • High cholesterol    • Hypertension 1999   • PCOS (polycystic ovarian syndrome)    • PONV  (postoperative nausea and vomiting)    • Rheumatoid arthritis (CMS/HCC)      Past Surgical History:   Procedure Laterality Date   • CHOLECYSTECTOMY      lap   • ENDOSCOPY N/A 2020    Procedure: ESOPHAGOGASTRODUODENOSCOPY WITH ANESTHESIA;  Surgeon: Kermit Marlow MD;  Location: [facility] ENDOSCOPY;  Service: General;  Laterality: N/A;  pre: pre op scope  post op: gastritis, polyp  PCP: Gayatri Aquino MD   • WISDOM TOOTH EXTRACTION       Family History   Problem Relation Age of Onset   • Cancer Mother    • Hypertension Mother    • Obesity Mother    • Diabetes Mother    • Colon cancer Mother    • COPD Father    • Diabetes Father    • Obesity Father    • Hypertension Father    • Arthritis Father    • Depression Father    • Hearing loss Father    • Breast cancer Neg Hx    • Ovarian cancer Neg Hx    • Uterine cancer Neg Hx      Social History     Tobacco Use   • Smoking status: Former Smoker     Packs/day: 0.50     Years: 8.00     Pack years: 4.00     Types: Cigarettes     Start date: 1999     Last attempt to quit: 3/15/2006     Years since quittin.3   • Smokeless tobacco: Never Used   Substance Use Topics   • Alcohol use: Not Currently     Frequency: Never   • Drug use: Never       (Not in a hospital admission)  Allergies:  Penicillins      Current Outpatient Medications:   •  cholecalciferol (VITAMIN D3) 25 MCG (1000 UT) tablet, Take 1,000 Units by mouth Daily., Disp: , Rfl:   •  coenzyme Q10 100 MG capsule, Take 100 mg by mouth Daily., Disp: , Rfl:   •  Cyanocobalamin (VITAMIN B 12 PO), Take  by mouth., Disp: , Rfl:   •  hydroCHLOROthiazide (HYDRODIURIL) 12.5 MG tablet, Take 1 tablet by mouth every day with olmesartan, Disp: 120 tablet, Rfl: 0  •  Insulin Glargine, 1 Unit Dial, (TOUJEO) 300 UNIT/ML solution pen-injector injection, Inject 50 Units into the skin nightly, Disp: 6 mL, Rfl: 1  •  Insulin Pen Needle 32G X 4 MM misc, Use one daily, Disp: 100 each, Rfl: 3  •  Liraglutide (VICTOZA) 18 MG/3ML  solution pen-injector injection, Inject 1.8 mg into the skin daily, Disp: 6 pen, Rfl: 3  •  Multiple Vitamins-Minerals (MULTIVITAMIN WITH MINERALS) tablet tablet, Take 1 tablet by mouth Daily., Disp: , Rfl:   •  nebivolol (BYSTOLIC) 10 MG tablet, Take 1 tablet by mouth every morning, Disp: 30 tablet, Rfl: 5  •  norethindrone-ethinyl estradiol FE (Microgestin FE 1/20) 1-20 MG-MCG per tablet, Take 1 tablet by mouth Daily., Disp: 84 tablet, Rfl: 0  •  olmesartan (BENICAR) 40 MG tablet, Take 1 tablet by mouth every day with hydrochlorothiazide. Replaces combo tablet, Disp: 120 tablet, Rfl: 0  •  olmesartan-hydrochlorothiazide (BENICAR HCT) 40-12.5 MG per tablet, Take 1 tablet by mouth daily, Disp: 90 tablet, Rfl: 0  •  pantoprazole (PROTONIX) 40 MG EC tablet, Take 1 tablet by mouth Daily., Disp: 30 tablet, Rfl: 1  •  rosuvastatin (CRESTOR) 40 MG tablet, Take 1 tablet by mouth daily, Disp: 90 tablet, Rfl: 0  •  vilazodone (VIIBRYD) 20 MG tablet tablet, Take 1 tablet by mouth daily, Disp: 30 tablet, Rfl: 2    Objective     Vital Signs     Body mass index is 42.87 kg/m².      08/03/20  0900   Weight: 110 kg (242 lb)       Physical Exam   Constitutional: She appears well-developed and well-nourished.   HENT:   Head: Normocephalic and atraumatic.   Eyes: Conjunctivae and EOM are normal.   Wearing glasses   Neck: Neck normal appearance.  Pulmonary/Chest: Effort normal.   Abdominal:   Obese   Musculoskeletal: Normal range of motion.   Neurological: She is alert.   Skin: Skin is dry.   Psychiatric: She has a normal mood and affect.       Results Review:   None      Assessment/Plan   Encounter Diagnoses   Name Primary?   • Class 3 severe obesity due to excess calories with serious comorbidity and body mass index (BMI) of 40.0 to 44.9 in adult (CMS/HCC) Yes   • Essential hypertension    • High cholesterol    • Polycystic ovarian syndrome    • Type 2 diabetes mellitus without complication, unspecified whether long term insulin use  (CMS/[facility])          1. Harry Panda was seen today via video for follow-up, obesity, nutrition counseling and weight loss. She has lost 3 lbs of weight since her last visit, making her Body mass index is 42.87 kg/m².. Today we discussed consistency with healthy changes in lifestyle, diet, and exercise for long term success. Harry Panda had received handouts to her explaining the recommendation on portion sizes/appetite control/reading nutrition labels. Intensive behavioral therapy for obesity was done today as well.    Goals for this month are: try protein shake with coffee and continue to work towards following the meal prescription as provided focusing on eating 4 meals/day with vegetables at every meal, eliminating carbohydrates after lunch, and getting adequate water and protein intake. Patient encouraged to call with questions and/or struggles as they may arise prior to next scheduled appointment.    2. Current comorbid conditions of hypertension, high cholesterol, PCOS, and diabetes associated with her morbid obesity are reported to be stable on her current treatment regimen and medications. We anticipate the comorbid conditions to improve as we address her morbid obesity.    A total of 15 minutes was spent face to face with this patient on video and over half of the time was spent on counseling and coordination of care for the disease of obesity. We specifically reviewed the dietary prescription and I made recommendations toward increasing exercise as tolerated as well as focusing on training their behavior toward storing less.    Pre-op testing: Clearances done.    Seminar - Completed  EGD - Completed and Normal  H. Pylori - Negative   H. Pylori Stool -  N/A    Psychological Evaluation - Cleared  EKG - Normal   Cardiology - N/A     TSH - Normal  Nicotine - N/A    Pulmonary Clearance - N/A   Drug Tests - N/A    Dietitian - Completed    Follow up in 1 month, with Dr. Marlow, for a weight recheck and surgery  submit.    Melinda Taveras, APRN    08/03/20  09:11  Patient Care Team:  Gayatri Aquino MD as PCP - General  Gayatri Aquino MD as PCP - Family Medicine

## 2020-08-11 RX ORDER — INSULIN GLARGINE 300 U/ML
50 INJECTION, SOLUTION SUBCUTANEOUS NIGHTLY
Qty: 6 ML | Refills: 1 | Status: SHIPPED | OUTPATIENT
Start: 2020-08-11 | End: 2020-10-06

## 2020-08-11 NOTE — TELEPHONE ENCOUNTER
Becky Mock called requesting a refill of the below medication which has been pended for you:     Requested Prescriptions     Pending Prescriptions Disp Refills    TOUJEO SOLOSTAR 300 UNIT/ML SOPN [Pharmacy Med Name: Insulin Glargine (1 Unit Dial) 300 UNIT/ML Subcutaneous Solution Pen-injector (TOUJEO)] 6 mL 1     Sig: Inject 50 Units into the skin nightly       Last Appointment Date: 5/19/2020  Next Appointment Date: 8/19/2020    Allergies   Allergen Reactions    Penicillins      unknown

## 2020-08-13 ENCOUNTER — TRANSCRIBE ORDERS (OUTPATIENT)
Dept: ADMINISTRATIVE | Facility: HOSPITAL | Age: 40
End: 2020-08-13

## 2020-08-13 ENCOUNTER — LAB (OUTPATIENT)
Dept: LAB | Facility: HOSPITAL | Age: 40
End: 2020-08-13

## 2020-08-13 DIAGNOSIS — E11.9 TYPE 2 DIABETES MELLITUS WITHOUT COMPLICATION, UNSPECIFIED WHETHER LONG TERM INSULIN USE (HCC): ICD-10-CM

## 2020-08-13 DIAGNOSIS — E66.01 MORBID OBESITY (HCC): ICD-10-CM

## 2020-08-13 DIAGNOSIS — E78.2 MIXED HYPERLIPIDEMIA: Primary | ICD-10-CM

## 2020-08-13 DIAGNOSIS — F41.1 GENERALIZED ANXIETY DISORDER: ICD-10-CM

## 2020-08-13 DIAGNOSIS — I10 ESSENTIAL HYPERTENSION: ICD-10-CM

## 2020-08-13 LAB
25(OH)D3 SERPL-MCNC: 38.9 NG/ML (ref 30–100)
ALBUMIN SERPL-MCNC: 4.3 G/DL
ALBUMIN SERPL-MCNC: 4.3 G/DL (ref 3.5–5)
ALBUMIN/GLOB SERPL: 1.3 G/DL (ref 1.1–2.5)
ALP BLD-CCNC: 59 U/L
ALP SERPL-CCNC: 59 U/L (ref 24–120)
ALT SERPL W P-5'-P-CCNC: 25 U/L (ref 0–35)
ALT SERPL-CCNC: 25 U/L
ANION GAP SERPL CALCULATED.3IONS-SCNC: 8 MMOL/L
ANION GAP SERPL CALCULATED.3IONS-SCNC: 8 MMOL/L (ref 4–13)
AST SERPL-CCNC: 26 U/L
AST SERPL-CCNC: 26 U/L (ref 7–45)
AVERAGE GLUCOSE: NORMAL
BILIRUB SERPL-MCNC: 0.7 MG/DL (ref 0.1–1)
BILIRUB SERPL-MCNC: 0.7 MG/DL (ref 0.1–1.4)
BUN BLDV-MCNC: 13 MG/DL
BUN SERPL-MCNC: 13 MG/DL (ref 5–21)
BUN/CREAT SERPL: 20.3
CALCIUM SERPL-MCNC: 9.4 MG/DL
CALCIUM SPEC-SCNC: 9.4 MG/DL (ref 8.4–10.4)
CHLORIDE BLD-SCNC: 103 MMOL/L
CHLORIDE SERPL-SCNC: 103 MMOL/L (ref 98–110)
CHOLEST SERPL-MCNC: 167 MG/DL (ref 130–200)
CHOLESTEROL, TOTAL: 167 MG/DL
CHOLESTEROL/HDL RATIO: 1.49
CO2 SERPL-SCNC: 27 MMOL/L (ref 24–31)
CO2: 27 MMOL/L
CREAT SERPL-MCNC: 0.64 MG/DL
CREAT SERPL-MCNC: 0.64 MG/DL (ref 0.5–1.4)
GFR CALCULATED: 103
GFR SERPL CREATININE-BSD FRML MDRD: 103 ML/MIN/1.73
GLOBULIN UR ELPH-MCNC: 3.4 GM/DL
GLUCOSE BLD-MCNC: 124 MG/DL
GLUCOSE SERPL-MCNC: 124 MG/DL (ref 70–100)
HBA1C MFR BLD: 7.9 %
HBA1C MFR BLD: 7.9 % (ref 4.8–5.9)
HDLC SERPL-MCNC: 51 MG/DL
HDLC SERPL-MCNC: 51 MG/DL (ref 35–70)
LDL CHOLESTEROL CALCULATED: 76 MG/DL (ref 0–160)
LDLC SERPL CALC-MCNC: 76 MG/DL (ref 0–99)
LDLC/HDLC SERPL: 1.49 {RATIO}
POTASSIUM SERPL-SCNC: 4.4 MMOL/L
POTASSIUM SERPL-SCNC: 4.4 MMOL/L (ref 3.5–5.3)
PROT SERPL-MCNC: 7.7 G/DL (ref 6.3–8.7)
SODIUM BLD-SCNC: 138 MMOL/L
SODIUM SERPL-SCNC: 138 MMOL/L (ref 135–145)
TOTAL PROTEIN: 7.7
TRIGL SERPL-MCNC: 200 MG/DL
TRIGL SERPL-MCNC: 200 MG/DL (ref 0–149)
VITAMIN D 25-HYDROXY: 38.9
VITAMIN D2, 25 HYDROXY: NORMAL
VITAMIN D3,25 HYDROXY: NORMAL
VLDLC SERPL CALC-MCNC: 40 MG/DL
VLDLC SERPL-MCNC: 40 MG/DL

## 2020-08-13 PROCEDURE — 82306 VITAMIN D 25 HYDROXY: CPT | Performed by: INTERNAL MEDICINE

## 2020-08-13 PROCEDURE — 80053 COMPREHEN METABOLIC PANEL: CPT | Performed by: INTERNAL MEDICINE

## 2020-08-13 PROCEDURE — 83036 HEMOGLOBIN GLYCOSYLATED A1C: CPT | Performed by: INTERNAL MEDICINE

## 2020-08-13 PROCEDURE — 36415 COLL VENOUS BLD VENIPUNCTURE: CPT | Performed by: INTERNAL MEDICINE

## 2020-08-13 PROCEDURE — 80061 LIPID PANEL: CPT | Performed by: INTERNAL MEDICINE

## 2020-08-19 ENCOUNTER — OFFICE VISIT (OUTPATIENT)
Dept: INTERNAL MEDICINE | Age: 40
End: 2020-08-19
Payer: COMMERCIAL

## 2020-08-19 VITALS
BODY MASS INDEX: 43.05 KG/M2 | HEART RATE: 78 BPM | HEIGHT: 63 IN | WEIGHT: 243 LBS | DIASTOLIC BLOOD PRESSURE: 86 MMHG | OXYGEN SATURATION: 98 % | SYSTOLIC BLOOD PRESSURE: 120 MMHG | RESPIRATION RATE: 18 BRPM

## 2020-08-19 PROCEDURE — 3051F HG A1C>EQUAL 7.0%<8.0%: CPT | Performed by: INTERNAL MEDICINE

## 2020-08-19 PROCEDURE — 99214 OFFICE O/P EST MOD 30 MIN: CPT | Performed by: INTERNAL MEDICINE

## 2020-08-19 RX ORDER — FLUOXETINE 20 MG/1
20 TABLET, FILM COATED ORAL DAILY
Qty: 30 TABLET | Refills: 3 | Status: SHIPPED
Start: 2020-08-19 | End: 2020-09-10 | Stop reason: CLARIF

## 2020-08-19 ASSESSMENT — ENCOUNTER SYMPTOMS
SORE THROAT: 0
COUGH: 0
ABDOMINAL PAIN: 0
CHEST TIGHTNESS: 0
CONSTIPATION: 0
WHEEZING: 0

## 2020-08-19 NOTE — PROGRESS NOTES
Chief Complaint   Patient presents with    [address] makes the pt sick and wants to discuss this, also wants to discuss the ViiBryd     History of presenting illness:  Arturo Salinas is a40 y.o. female who presents today for follow up on her chronic medical conditions as noted below. Mixed hyperlipidemia-she has been taking her crestor 40 daily     Generalized anxiety disorder-  Change to viibryd 3 mo ago- states not feeling  better       Type 2 diabetes mellitus without complication, without long-term current use of insulin ([address]-   Appointment here in February 2020 significantly elevated A1c 9.5  At that time medications were changed, insulin was increased and Victoza was added  She has lost 5 pounds  She states her blood sugars have been doing better     Essential hypertension-Patient reports her Bp has been well controlled ( systolic below 560; diastolic below 90) at home when checked with home/ store equipment. No side effects related to blood pressure medications were reported by patient    Patient Active Problem List    Diagnosis Date Noted    Medication intolerance 01/17/2019     Overview Note:     Intolerance to metformin      Generalized anxiety disorder 09/19/2017    Essential hypertension 09/18/2017    Type 2 diabetes mellitus without complication, without long-term current use of insulin ([address] 09/18/2017    Fatty liver 09/18/2017    Morbid obesity due to excess calories ([address] 09/18/2017    Mixed hyperlipidemia 09/18/2017    Chronic midline low back pain without sciatica 09/18/2017     No past medical history on file.    Past Surgical History:   Procedure Laterality Date    CHOLECYSTECTOMY       Current Outpatient Medications   Medication Sig Dispense Refill    FLUoxetine (PROZAC) 20 MG tablet Take 1 tablet by mouth daily 30 tablet 3    TOUJEO SOLOSTAR 300 UNIT/ML SOPN Inject 50 Units into the skin nightly 6 mL 1    nebivolol (BYSTOLIC) 10 MG tablet Take 1 tablet by mouth every morning 30 tablet 5    olmesartan-hydrochlorothiazide (BENICAR HCT) 40-12.5 MG per tablet Take 1 tablet by mouth daily 90 tablet 0    rosuvastatin (CRESTOR) 40 MG tablet Take 1 tablet by mouth daily 90 tablet 0    Insulin Pen Needle 32G X 4 MM MISC 1 each by Does not apply route daily 100 each 3    Liraglutide (VICTOZA) 18 MG/3ML SOPN SC injection Inject 1.8 mg into the skin daily (Patient not taking: Reported on 2020) 3 pen 3     No current facility-administered medications for this visit. Allergies   Allergen Reactions    Penicillins      unknown     Social History     Tobacco Use    Smoking status: Former Smoker     Packs/day: 0.50     Years: 7.00     Pack years: 3.50     Types: Cigarettes     Last attempt to quit:      Years since quittin.6    Smokeless tobacco: Never Used   Substance Use Topics    Alcohol use: No      Family History   Problem Relation Age of Onset    Diabetes Mother     High Blood Pressure Mother     Colon Cancer Mother 61    Diabetes Father        Review of Systems   Constitutional: Positive for fatigue. Negative for chills and fever. HENT: Negative for congestion, ear pain, nosebleeds, postnasal drip and sore throat. Respiratory: Negative for cough, chest tightness and wheezing. Cardiovascular: Negative for chest pain, palpitations and leg swelling. Gastrointestinal: Negative for abdominal pain and constipation. Genitourinary: Negative for dysuria and urgency. Musculoskeletal: Negative. Negative for arthralgias. Skin: Negative for rash. Neurological: Negative for dizziness and headaches. Psychiatric/Behavioral: Negative. Vitals:    20 1122   BP: 120/86   Site: Left Upper Arm   Position: Sitting   Cuff Size: Large Adult   Pulse: 78   Resp: 18   SpO2: 98%   Weight: 243 lb (110.2 kg)   Height: 5' 3\" (1.6 m)     Body mass index is 43.05 kg/m². Physical Exam  Constitutional:       Appearance: She is well-developed. HENT:      Right Ear: External ear normal.      Left Ear: External ear normal.      Mouth/Throat:      Pharynx: No oropharyngeal exudate. Eyes:      Conjunctiva/sclera: Conjunctivae normal.      Pupils: Pupils are equal, round, and reactive to light. Neck:      Musculoskeletal: Neck supple. Thyroid: No thyromegaly. Vascular: No JVD. Cardiovascular:      Rate and Rhythm: Normal rate. Heart sounds: Normal heart sounds. No murmur. Pulmonary:      Effort: No respiratory distress. Breath sounds: Normal breath sounds. No wheezing or rales. Chest:      Chest wall: No tenderness. Abdominal:      General: Bowel sounds are normal.      Palpations: Abdomen is soft. Lymphadenopathy:      Cervical: No cervical adenopathy. Skin:     General: Skin is warm. Findings: No rash. Neurological:      Mental Status: She is oriented to person, place, and time.          Lab Review   Orders Only on 08/17/2020   Component Date Value    Vit D, 25-Hydroxy 08/13/2020 38.9    Orders Only on 08/13/2020   Component Date Value    Sodium 08/13/2020 138     Chloride 08/13/2020 103     Potassium 08/13/2020 4.4     BUN 08/13/2020 13     CREATININE 08/13/2020 0.64     Glucose 08/13/2020 124     AST 08/13/2020 26     ALT 08/13/2020 25     Calcium 08/13/2020 9.4     Total Protein 08/13/2020 7.7     CO2 08/13/2020 27.0     Alb 08/13/2020 4.30     Alkaline Phosphatase 08/13/2020 59     Total Bilirubin 08/13/2020 0.7     Gfr Calculated 08/13/2020 103     Anion Gap 08/13/2020 8.0     Cholesterol, Total 08/13/2020 167     HDL 08/13/2020 51     LDL Calculated 08/13/2020 76     Triglycerides 08/13/2020 200     Chol/HDL Ratio 08/13/2020 1.49     VLDL 08/13/2020 40     Hemoglobin A1C 08/13/2020 7.9            ASSESSMENT/PLAN:    Mixed hyperlipidemia- triglycerides 179( 211)(153)( 200),   LDL 76 ( 63) ( 86) (81)(143)(94 ) ,  Continue Crestor 40 + diet       Vit D level 26 2/2020  Cont  4000 daily     Type 2 diabetes mellitus without complication, without long-term current use of insulin (HCC)  Hyperglycemia  A1c is 7.9- 8/2020 (7.4 in 5/2020)  Previous 2/2020:9.15 ( 9.6) (8.1) (6.8) (7.8) (6.5)( 6.1) (7.9)( 7.2)  Plan as follows  # 1 cont  Victoza 1.8 daily- do not skip days  # 2 strict diet and wt loss  # 3 exercise  # 4 cont  long-acting insulin Toujeo 50 units daily  Patient will be recording blood sugars before each meal  #5 a1c repeat 3 mo     Obesity  This time patient has lost few lbs  She has met bariatric surgeon at [facility]  She is considering bariatric surgery later this summer/early fall 2020     Fatty liver-   LFTs 8/2020 + 2/2020 nl range  Previously alt 36 ( 54)/ AST 33 ( 48)  / strict diet and weight loss recommended     Essential hypertension-  she will continue current PDULRNF 97/80.7  AKEQ and Bystolic 10 mg daily  Her blood pressure has been good  Little high today but has been good     Generalized anxiety disorder-  She does not feel that Donel Comp has helped her much better than Lexapro  Recommendations today  Discontinue Viibryd  Start  Fluoxetine 20 mg daily    Patient is planning gastric sleeve surgery in September 2020  I discussed with her that we need to do a hospital follow-up and closely monitor her blood pressure and diabetes medication since most likely major changes will need to be made to those medications post operatively  Suggest hospital follow-up with me within 1 week post surgery  I have not scheduled follow-up appointment for her at this time since we do not know the surgery date yet    No orders of the defined types were placed in this encounter. New Prescriptions    FLUOXETINE (PROZAC) 20 MG TABLET    Take 1 tablet by mouth daily         No follow-ups on file. There are no Patient Instructions on file for this visit.   EMR Dragon/transcription disclaimer:Significant part of this  encounter note is electronic transcription/translationof spoken language to printed text. The electronic translation of spoken language may be erroneous, or at times, nonsensical words or phrases may be inadvertently transcribed.  Although I have reviewed the note for sucherrors, some may still exist.

## 2020-09-01 ENCOUNTER — OFFICE VISIT (OUTPATIENT)
Dept: BARIATRICS/WEIGHT MGMT | Facility: CLINIC | Age: 40
End: 2020-09-01

## 2020-09-01 VITALS
SYSTOLIC BLOOD PRESSURE: 118 MMHG | OXYGEN SATURATION: 98 % | HEIGHT: 64 IN | DIASTOLIC BLOOD PRESSURE: 87 MMHG | BODY MASS INDEX: 41.93 KG/M2 | TEMPERATURE: 97.8 F | HEART RATE: 78 BPM | WEIGHT: 245.6 LBS

## 2020-09-01 DIAGNOSIS — E11.9 TYPE 2 DIABETES MELLITUS WITHOUT COMPLICATION, UNSPECIFIED WHETHER LONG TERM INSULIN USE (HCC): ICD-10-CM

## 2020-09-01 DIAGNOSIS — I10 ESSENTIAL HYPERTENSION: ICD-10-CM

## 2020-09-01 DIAGNOSIS — E66.01 CLASS 3 SEVERE OBESITY DUE TO EXCESS CALORIES WITH SERIOUS COMORBIDITY AND BODY MASS INDEX (BMI) OF 40.0 TO 44.9 IN ADULT (HCC): Primary | ICD-10-CM

## 2020-09-01 DIAGNOSIS — E28.2 POLYCYSTIC OVARIAN SYNDROME: ICD-10-CM

## 2020-09-01 DIAGNOSIS — K76.0 FATTY LIVER: ICD-10-CM

## 2020-09-01 PROCEDURE — 99214 OFFICE O/P EST MOD 30 MIN: CPT | Performed by: SURGERY

## 2020-09-01 RX ORDER — ONDANSETRON 2 MG/ML
4 INJECTION INTRAMUSCULAR; INTRAVENOUS EVERY 6 HOURS PRN
Status: CANCELLED | OUTPATIENT
Start: 2020-09-01

## 2020-09-01 RX ORDER — APREPITANT 40 MG/1
40 CAPSULE ORAL DAILY
Qty: 1 CAPSULE | Refills: 0 | Status: SHIPPED | OUTPATIENT
Start: 2020-09-01 | End: 2020-10-12

## 2020-09-01 NOTE — PROGRESS NOTES
Patient Care Team:  Gayatri Aquino MD as PCP - General  Gayatri Aquino MD as PCP - Family Medicine    Reason for Visit:  Surgical Weight loss    Subjective     Patient is a 39 y.o. female presents with morbid obesity and her Body mass index is 42.49 kg/m².     She is here for discussion of surgical weight loss options.  She stated she has been with the disease of obesity for year(s).  She stated she suffers from fatty liver disease, hypertension, polycystic ovarian syndrome and morbid obesity due to her weight gain.  She stated that weight loss helps alleviate these symptoms.   She stated that she has tried multiple diet regimens including completing a medically supervised weight loss program to help with weight loss.  She stated that she has attempted these conservative methods for weight loss without maintaining long term success.  Today she would like to discuss surgical weight loss options such as the Laparoscopic Sleeve Gastrectomy or the Laparoscopic R - Y Gastric Bypass.     Review of Systems  General ROS: negative  Psychological ROS: negative  Ophthalmic ROS: negative  ENT ROS: negative  Hematological and Lymphatic ROS: negative  Endocrine ROS: negative  Respiratory ROS: no cough, shortness of breath, or wheezing  Cardiovascular ROS: no chest pain or dyspnea on exertion  Gastrointestinal ROS: no abdominal pain, change in bowel habits, or black or bloody stools  Genito-Urinary ROS: no dysuria, trouble voiding, or hematuria  Musculoskeletal ROS: positive for - joint pain    History  Past Medical History:   Diagnosis Date   • Anxiety    • Cholelithiasis 2004   • Chronic fatigue    • Diabetes mellitus (CMS/HCC)    • High cholesterol    • Hypertension 1999   • PCOS (polycystic ovarian syndrome)    • PONV (postoperative nausea and vomiting) 2004   • Rheumatoid arthritis (CMS/HCC)      Past Surgical History:   Procedure Laterality Date   • CHOLECYSTECTOMY      lap   • ENDOSCOPY N/A 6/19/2020    Procedure:  ESOPHAGOGASTRODUODENOSCOPY WITH ANESTHESIA;  Surgeon: Kermit Marlow MD;  Location: [facility] ENDOSCOPY;  Service: General;  Laterality: N/A;  pre: pre op scope  post op: gastritis, polyp  PCP: Gayatri Aquino MD   • WISDOM TOOTH EXTRACTION       Family History   Problem Relation Age of Onset   • Cancer Mother    • Hypertension Mother    • Obesity Mother    • Diabetes Mother    • Colon cancer Mother    • COPD Father    • Diabetes Father    • Obesity Father    • Hypertension Father    • Arthritis Father    • Depression Father    • Hearing loss Father    • Breast cancer Neg Hx    • Ovarian cancer Neg Hx    • Uterine cancer Neg Hx      Social History     Tobacco Use   • Smoking status: Former Smoker     Packs/day: 0.50     Years: 8.00     Pack years: 4.00     Types: Cigarettes     Start date: 1999     Last attempt to quit: 3/15/2006     Years since quittin.4   • Smokeless tobacco: Never Used   Substance Use Topics   • Alcohol use: Not Currently     Frequency: Never   • Drug use: Never       (Not in a hospital admission)  Allergies:  Penicillins      Current Outpatient Medications:   •  cholecalciferol (VITAMIN D3) 25 MCG (1000 UT) tablet, Take 1,000 Units by mouth Daily., Disp: , Rfl:   •  coenzyme Q10 100 MG capsule, Take 100 mg by mouth Daily., Disp: , Rfl:   •  Cyanocobalamin (VITAMIN B 12 PO), Take  by mouth., Disp: , Rfl:   •  FLUoxetine (PROzac) 20 MG capsule, Take 1 capsule by mouth Daily., Disp: 30 capsule, Rfl: 3  •  hydroCHLOROthiazide (HYDRODIURIL) 12.5 MG tablet, Take 1 tablet by mouth every day with olmesartan, Disp: 120 tablet, Rfl: 0  •  Insulin Glargine, 1 Unit Dial, (TOUJEO) 300 UNIT/ML solution pen-injector injection, Inject 50 Units into the skin nightly, Disp: 4.5 mL, Rfl: 1  •  Insulin Pen Needle 32G X 4 MM misc, Use one daily, Disp: 100 each, Rfl: 3  •  Multiple Vitamins-Minerals (MULTIVITAMIN WITH MINERALS) tablet tablet, Take 1 tablet by mouth Daily., Disp: , Rfl:   •  nebivolol  (BYSTOLIC) 10 MG tablet, Take 1 tablet by mouth every morning, Disp: 30 tablet, Rfl: 5  •  norethindrone-ethinyl estradiol FE (Microgestin FE 1/20) 1-20 MG-MCG per tablet, Take 1 tablet by mouth Daily., Disp: 84 tablet, Rfl: 0  •  olmesartan (BENICAR) 40 MG tablet, Take 1 tablet by mouth every day with hydrochlorothiazide. Replaces combo tablet, Disp: 120 tablet, Rfl: 0  •  pantoprazole (PROTONIX) 40 MG EC tablet, Take 1 tablet by mouth Daily., Disp: 30 tablet, Rfl: 1  •  rosuvastatin (CRESTOR) 40 MG tablet, Take 1 tablet by mouth daily, Disp: 90 tablet, Rfl: 0  •  olmesartan-hydrochlorothiazide (BENICAR HCT) 40-12.5 MG per tablet, Take 1 tablet by mouth daily, Disp: 90 tablet, Rfl: 0    Objective     Vital Signs  Temp:  [97.8 °F (36.6 °C)] 97.8 °F (36.6 °C)  Heart Rate:  [78] 78  BP: (118)/(87) 118/87  Body mass index is 42.49 kg/m².      09/01/20  0806   Weight: 111 kg (245 lb 9.6 oz)       General Appearance:  awake, alert, oriented, in no acute distress  Lungs:  Normal expansion.  Clear to auscultation.  No rales, rhonchi, or wheezing.  Heart:  Heart sounds are normal.  Regular rate and rhythm without murmur, gallop or rub.  Abdomen:  Soft, non-tender, normal bowel sounds; no bruits, organomegaly or masses.  Abnormal shape: obese  Extremities: Extremities warm to touch, pink, with no edema.      Results Review:   I reviewed the patient's new clinical results.        Assessment/Plan   Encounter Diagnoses   Name Primary?   • Class 3 severe obesity due to excess calories with serious comorbidity and body mass index (BMI) of 40.0 to 44.9 in adult (CMS/HCC) Yes   • Fatty liver    • Type 2 diabetes mellitus without complication, unspecified whether long term insulin use (CMS/HCC)    • Essential hypertension    • Polycystic ovarian syndrome        I believe this patient will be a good candidate for weight loss surgery.    She has chosen the da John assisted laparoscopic sleeve gastrectomy. I agree with this decision.   I have discussed the Fredrick - Y Gastric Bypass, laparoscopic sleeve gastrectomy and the Laparoscopic Gastric Band procedures to provide the alternatives which includes non surgical weight loss options as well.  We discussed the benefits of the surgeries including the benefit of weight loss and the possible reversal of co-morbid conditions associated with morbid obesity.  She is aware that the Sleeve procedure is not reversible.  We discussed the complications and risks which include the risk of perforation, leakage,bleeding, intra-abdominal organ injury, stenosis or ulcerations, the risk of venous thrombosis formation in the lungs, mesentery veins or lower extremities  leading to possible organ injury and death.  I explained to her the possibility that this procedure may not be performed laparoscopically and may require being converted to an opened procedure or aborted due to abnormal anatomy.  Also postoperatively there is a risk of increased GERD symptoms after this procedure.  I have explained if she develops intestinal metaplasia of her esophagus consideration for conversion to another weight loss procedure may be necessary.    The patient states that her fatty liver disease, hypertension, diabetes and polycystic ovarian syndrome have remained stable on the current treatment regimen.  I anticipate reversal of these comorbid conditions associated with morbid obesity with reversal of her morbid obesity.  Rob Report   As part of this patient's treatment plan I am prescribing controlled substances. The patient has been made aware of appropriate use of such medications, including potential risk of somnolence, limited ability to drive and /or work safely, and potential for dependence or overdose. It has also been made clear that these medications are for use by this patient only, without concomitant use of alcohol or other substances unless prescribed.    Harry Panda will be required to complete the educational  "program \"BO[employer]AMP\" detailing terms of continued prescribing of controlled substances, including monitoring ESTEVAN reports, urine drug screening, and pill counts if necessary. Harry Panda is aware that inappropriate use will result in cessation of prescribing such medications.    ESTEVAN report has been reviewed.      History and physical exam exhibit continued safe and appropriate use of controlled substances.       Harry Panda understands the surgical procedures and the different surgical options that are available.  She understands the lifestyle changes that are required after surgery and has agreed to follow the guidelines outlined in the weight management program. Harry Panda also expressed understanding of the risks involved and had all of female questions answered and desires to proceed.    Upon completion of our discussion and addressing and answering her questions to her satisfaction, informed consent was obtained.   She will be scheduled accordingly for a laparoscopic Sleeve gastrectomy procedure.      I discussed the patient's findings and my recommendations with patient.     I have also recommended that she obtain completion of her preoperative diet, and preoperative laboratory studies prior to surgery consideration.    Dr. Kermit Marlow MD [facility]    09/01/20  09:01  Patient Care Team:  Gayatri Aquino MD as PCP - General  Gayatri Aquino MD as PCP - Family Medicine    "

## 2020-09-03 ENCOUNTER — APPOINTMENT (OUTPATIENT)
Dept: GENERAL RADIOLOGY | Facility: HOSPITAL | Age: 40
End: 2020-09-03

## 2020-09-03 ENCOUNTER — TELEPHONE (OUTPATIENT)
Dept: INTERNAL MEDICINE | Age: 40
End: 2020-09-03

## 2020-09-03 ENCOUNTER — HOSPITAL ENCOUNTER (INPATIENT)
Facility: HOSPITAL | Age: 40
LOS: 1 days | Discharge: HOME | End: 2020-09-04
Attending: FAMILY MEDICINE | Admitting: INTERNAL MEDICINE

## 2020-09-03 DIAGNOSIS — I21.3 ST ELEVATION MYOCARDIAL INFARCTION (STEMI), UNSPECIFIED ARTERY (HCC): Primary | ICD-10-CM

## 2020-09-03 DIAGNOSIS — I21.02 ST ELEVATION MYOCARDIAL INFARCTION INVOLVING LEFT ANTERIOR DESCENDING (LAD) CORONARY ARTERY (HCC): ICD-10-CM

## 2020-09-03 LAB
ALBUMIN SERPL-MCNC: 3.8 G/DL (ref 3.5–5.2)
ALBUMIN/GLOB SERPL: 1.7 G/DL
ALP SERPL-CCNC: 48 U/L (ref 39–117)
ALT SERPL W P-5'-P-CCNC: 20 U/L (ref 1–33)
ANION GAP SERPL CALCULATED.3IONS-SCNC: 17 MMOL/L (ref 5–15)
AST SERPL-CCNC: 23 U/L (ref 1–32)
BASOPHILS # BLD AUTO: 0.05 10*3/MM3 (ref 0–0.2)
BASOPHILS NFR BLD AUTO: 0.5 % (ref 0–1.5)
BILIRUB SERPL-MCNC: 0.4 MG/DL (ref 0–1.2)
BUN SERPL-MCNC: 11 MG/DL (ref 6–20)
BUN/CREAT SERPL: 21.6 (ref 7–25)
CALCIUM SPEC-SCNC: 8.2 MG/DL (ref 8.6–10.5)
CHLORIDE SERPL-SCNC: 97 MMOL/L (ref 98–107)
CO2 SERPL-SCNC: 21 MMOL/L (ref 22–29)
CREAT SERPL-MCNC: 0.51 MG/DL (ref 0.57–1)
DEPRECATED RDW RBC AUTO: 36.6 FL (ref 37–54)
EOSINOPHIL # BLD AUTO: 0.1 10*3/MM3 (ref 0–0.4)
EOSINOPHIL NFR BLD AUTO: 1 % (ref 0.3–6.2)
ERYTHROCYTE [DISTWIDTH] IN BLOOD BY AUTOMATED COUNT: 12 % (ref 12.3–15.4)
GFR SERPL CREATININE-BSD FRML MDRD: 134 ML/MIN/1.73
GLOBULIN UR ELPH-MCNC: 2.2 GM/DL
GLUCOSE BLDC GLUCOMTR-MCNC: 137 MG/DL (ref 70–130)
GLUCOSE SERPL-MCNC: 171 MG/DL (ref 65–99)
HCT VFR BLD AUTO: 39.7 % (ref 34–46.6)
HGB BLD-MCNC: 13.4 G/DL (ref 12–15.9)
HOLD SPECIMEN: NORMAL
HOLD SPECIMEN: NORMAL
IMM GRANULOCYTES # BLD AUTO: 0.03 10*3/MM3 (ref 0–0.05)
IMM GRANULOCYTES NFR BLD AUTO: 0.3 % (ref 0–0.5)
LYMPHOCYTES # BLD AUTO: 3.47 10*3/MM3 (ref 0.7–3.1)
LYMPHOCYTES NFR BLD AUTO: 34.7 % (ref 19.6–45.3)
MCH RBC QN AUTO: 28.6 PG (ref 26.6–33)
MCHC RBC AUTO-ENTMCNC: 33.8 G/DL (ref 31.5–35.7)
MCV RBC AUTO: 84.6 FL (ref 79–97)
MONOCYTES # BLD AUTO: 0.69 10*3/MM3 (ref 0.1–0.9)
MONOCYTES NFR BLD AUTO: 6.9 % (ref 5–12)
NEUTROPHILS NFR BLD AUTO: 5.66 10*3/MM3 (ref 1.7–7)
NEUTROPHILS NFR BLD AUTO: 56.6 % (ref 42.7–76)
NRBC BLD AUTO-RTO: 0 /100 WBC (ref 0–0.2)
PLATELET # BLD AUTO: 244 10*3/MM3 (ref 140–450)
PMV BLD AUTO: 12.1 FL (ref 6–12)
POTASSIUM SERPL-SCNC: 3.1 MMOL/L (ref 3.5–5.2)
PROT SERPL-MCNC: 6 G/DL (ref 6–8.5)
RBC # BLD AUTO: 4.69 10*6/MM3 (ref 3.77–5.28)
SARS-COV-2 RDRP RESP QL NAA+PROBE: NOT DETECTED
SODIUM SERPL-SCNC: 135 MMOL/L (ref 136–145)
TROPONIN T SERPL-MCNC: <0.01 NG/ML (ref 0–0.03)
TROPONIN T SERPL-MCNC: <0.01 NG/ML (ref 0–0.03)
WBC # BLD AUTO: 10 10*3/MM3 (ref 3.4–10.8)
WHOLE BLOOD HOLD SPECIMEN: NORMAL
WHOLE BLOOD HOLD SPECIMEN: NORMAL

## 2020-09-03 PROCEDURE — C1887 CATHETER, GUIDING: HCPCS | Performed by: INTERNAL MEDICINE

## 2020-09-03 PROCEDURE — 027034Z DILATION OF CORONARY ARTERY, ONE ARTERY WITH DRUG-ELUTING INTRALUMINAL DEVICE, PERCUTANEOUS APPROACH: ICD-10-PCS | Performed by: INTERNAL MEDICINE

## 2020-09-03 PROCEDURE — 4A023N7 MEASUREMENT OF CARDIAC SAMPLING AND PRESSURE, LEFT HEART, PERCUTANEOUS APPROACH: ICD-10-PCS | Performed by: INTERNAL MEDICINE

## 2020-09-03 PROCEDURE — C1769 GUIDE WIRE: HCPCS | Performed by: INTERNAL MEDICINE

## 2020-09-03 PROCEDURE — 93458 L HRT ARTERY/VENTRICLE ANGIO: CPT | Performed by: INTERNAL MEDICINE

## 2020-09-03 PROCEDURE — 93005 ELECTROCARDIOGRAM TRACING: CPT | Performed by: FAMILY MEDICINE

## 2020-09-03 PROCEDURE — C9606 PERC D-E COR REVASC W AMI S: HCPCS | Performed by: INTERNAL MEDICINE

## 2020-09-03 PROCEDURE — 25010000002 MIDAZOLAM HCL (PF) 5 MG/5ML SOLUTION: Performed by: INTERNAL MEDICINE

## 2020-09-03 PROCEDURE — 87635 SARS-COV-2 COVID-19 AMP PRB: CPT | Performed by: FAMILY MEDICINE

## 2020-09-03 PROCEDURE — 80053 COMPREHEN METABOLIC PANEL: CPT | Performed by: FAMILY MEDICINE

## 2020-09-03 PROCEDURE — 99152 MOD SED SAME PHYS/QHP 5/>YRS: CPT | Performed by: INTERNAL MEDICINE

## 2020-09-03 PROCEDURE — B2111ZZ FLUOROSCOPY OF MULTIPLE CORONARY ARTERIES USING LOW OSMOLAR CONTRAST: ICD-10-PCS | Performed by: INTERNAL MEDICINE

## 2020-09-03 PROCEDURE — C1894 INTRO/SHEATH, NON-LASER: HCPCS | Performed by: INTERNAL MEDICINE

## 2020-09-03 PROCEDURE — C1874 STENT, COATED/COV W/DEL SYS: HCPCS | Performed by: INTERNAL MEDICINE

## 2020-09-03 PROCEDURE — 25010000002 BIVALIRUDIN TRIFLUOROACETATE 250 MG RECONSTITUTED SOLUTION: Performed by: INTERNAL MEDICINE

## 2020-09-03 PROCEDURE — 99223 1ST HOSP IP/OBS HIGH 75: CPT | Performed by: INTERNAL MEDICINE

## 2020-09-03 PROCEDURE — 84484 ASSAY OF TROPONIN QUANT: CPT | Performed by: FAMILY MEDICINE

## 2020-09-03 PROCEDURE — 25010000002 DIPHENHYDRAMINE PER 50 MG: Performed by: INTERNAL MEDICINE

## 2020-09-03 PROCEDURE — C1725 CATH, TRANSLUMIN NON-LASER: HCPCS | Performed by: INTERNAL MEDICINE

## 2020-09-03 PROCEDURE — B2151ZZ FLUOROSCOPY OF LEFT HEART USING LOW OSMOLAR CONTRAST: ICD-10-PCS | Performed by: INTERNAL MEDICINE

## 2020-09-03 PROCEDURE — 25010000002 HEPARIN (PORCINE): Performed by: INTERNAL MEDICINE

## 2020-09-03 PROCEDURE — 25010000002 FENTANYL CITRATE (PF) 100 MCG/2ML SOLUTION: Performed by: INTERNAL MEDICINE

## 2020-09-03 PROCEDURE — 85025 COMPLETE CBC W/AUTO DIFF WBC: CPT | Performed by: FAMILY MEDICINE

## 2020-09-03 PROCEDURE — 82962 GLUCOSE BLOOD TEST: CPT

## 2020-09-03 PROCEDURE — 0 IOPAMIDOL PER 1 ML: Performed by: INTERNAL MEDICINE

## 2020-09-03 PROCEDURE — C1760 CLOSURE DEV, VASC: HCPCS | Performed by: INTERNAL MEDICINE

## 2020-09-03 PROCEDURE — 99284 EMERGENCY DEPT VISIT MOD MDM: CPT

## 2020-09-03 PROCEDURE — 92941 PRQ TRLML REVSC TOT OCCL AMI: CPT | Performed by: INTERNAL MEDICINE

## 2020-09-03 DEVICE — XIENCE SIERRA™ EVEROLIMUS ELUTING CORONARY STENT SYSTEM 3.50 MM X 15 MM / RAPID-EXCHANGE
Type: IMPLANTABLE DEVICE | Status: FUNCTIONAL
Brand: XIENCE SIERRA™

## 2020-09-03 RX ORDER — SODIUM CHLORIDE 9 MG/ML
100 INJECTION, SOLUTION INTRAVENOUS CONTINUOUS
Status: DISCONTINUED | OUTPATIENT
Start: 2020-09-03 | End: 2020-09-04

## 2020-09-03 RX ORDER — LOSARTAN POTASSIUM 50 MG/1
100 TABLET ORAL
Status: DISCONTINUED | OUTPATIENT
Start: 2020-09-04 | End: 2020-09-04 | Stop reason: HOSPADM

## 2020-09-03 RX ORDER — SODIUM CHLORIDE 0.9 % (FLUSH) 0.9 %
10 SYRINGE (ML) INJECTION AS NEEDED
Status: DISCONTINUED | OUTPATIENT
Start: 2020-09-03 | End: 2020-09-04 | Stop reason: HOSPADM

## 2020-09-03 RX ORDER — DIPHENHYDRAMINE HYDROCHLORIDE 50 MG/ML
INJECTION INTRAMUSCULAR; INTRAVENOUS AS NEEDED
Status: DISCONTINUED | OUTPATIENT
Start: 2020-09-03 | End: 2020-09-03 | Stop reason: HOSPADM

## 2020-09-03 RX ORDER — DEXTROSE MONOHYDRATE 25 G/50ML
25 INJECTION, SOLUTION INTRAVENOUS
Status: DISCONTINUED | OUTPATIENT
Start: 2020-09-03 | End: 2020-09-04 | Stop reason: HOSPADM

## 2020-09-03 RX ORDER — ONDANSETRON 2 MG/ML
4 INJECTION INTRAMUSCULAR; INTRAVENOUS EVERY 6 HOURS PRN
Status: DISCONTINUED | OUTPATIENT
Start: 2020-09-03 | End: 2020-09-04 | Stop reason: HOSPADM

## 2020-09-03 RX ORDER — ASPIRIN 81 MG/1
81 TABLET ORAL DAILY
Status: DISCONTINUED | OUTPATIENT
Start: 2020-09-04 | End: 2020-09-04 | Stop reason: HOSPADM

## 2020-09-03 RX ORDER — ASPIRIN 81 MG/1
324 TABLET, CHEWABLE ORAL ONCE
Status: DISCONTINUED | OUTPATIENT
Start: 2020-09-03 | End: 2020-09-03 | Stop reason: SDUPTHER

## 2020-09-03 RX ORDER — HYDROCHLOROTHIAZIDE 25 MG/1
12.5 TABLET ORAL DAILY
Status: DISCONTINUED | OUTPATIENT
Start: 2020-09-04 | End: 2020-09-04 | Stop reason: HOSPADM

## 2020-09-03 RX ORDER — NITROGLYCERIN 0.4 MG/1
0.4 TABLET SUBLINGUAL
Status: DISCONTINUED | OUTPATIENT
Start: 2020-09-03 | End: 2020-09-04 | Stop reason: HOSPADM

## 2020-09-03 RX ORDER — MIDAZOLAM HYDROCHLORIDE 1 MG/ML
INJECTION, SOLUTION INTRAMUSCULAR; INTRAVENOUS AS NEEDED
Status: DISCONTINUED | OUTPATIENT
Start: 2020-09-03 | End: 2020-09-03 | Stop reason: HOSPADM

## 2020-09-03 RX ORDER — SODIUM CHLORIDE 0.9 % (FLUSH) 0.9 %
10 SYRINGE (ML) INJECTION AS NEEDED
Status: DISCONTINUED | OUTPATIENT
Start: 2020-09-03 | End: 2020-09-04

## 2020-09-03 RX ORDER — SODIUM CHLORIDE 0.9 % (FLUSH) 0.9 %
10 SYRINGE (ML) INJECTION EVERY 12 HOURS SCHEDULED
Status: DISCONTINUED | OUTPATIENT
Start: 2020-09-03 | End: 2020-09-04 | Stop reason: HOSPADM

## 2020-09-03 RX ORDER — FENTANYL CITRATE 50 UG/ML
INJECTION, SOLUTION INTRAMUSCULAR; INTRAVENOUS AS NEEDED
Status: DISCONTINUED | OUTPATIENT
Start: 2020-09-03 | End: 2020-09-03 | Stop reason: HOSPADM

## 2020-09-03 RX ORDER — NICOTINE POLACRILEX 4 MG
15 LOZENGE BUCCAL
Status: DISCONTINUED | OUTPATIENT
Start: 2020-09-03 | End: 2020-09-04 | Stop reason: HOSPADM

## 2020-09-03 RX ORDER — FLUOXETINE HYDROCHLORIDE 20 MG/1
20 CAPSULE ORAL DAILY
Status: DISCONTINUED | OUTPATIENT
Start: 2020-09-04 | End: 2020-09-04 | Stop reason: HOSPADM

## 2020-09-03 RX ORDER — ONDANSETRON 4 MG/1
4 TABLET, FILM COATED ORAL EVERY 6 HOURS PRN
Status: DISCONTINUED | OUTPATIENT
Start: 2020-09-03 | End: 2020-09-04 | Stop reason: HOSPADM

## 2020-09-03 RX ORDER — ROSUVASTATIN CALCIUM 20 MG/1
40 TABLET, COATED ORAL NIGHTLY
Status: DISCONTINUED | OUTPATIENT
Start: 2020-09-03 | End: 2020-09-04 | Stop reason: HOSPADM

## 2020-09-03 RX ORDER — ACETAMINOPHEN 325 MG/1
650 TABLET ORAL EVERY 4 HOURS PRN
Status: DISCONTINUED | OUTPATIENT
Start: 2020-09-03 | End: 2020-09-04 | Stop reason: HOSPADM

## 2020-09-03 RX ORDER — ASPIRIN 81 MG/1
324 TABLET, CHEWABLE ORAL ONCE
Status: DISCONTINUED | OUTPATIENT
Start: 2020-09-03 | End: 2020-09-04 | Stop reason: HOSPADM

## 2020-09-03 RX ORDER — PANTOPRAZOLE SODIUM 40 MG/1
40 TABLET, DELAYED RELEASE ORAL
Status: DISCONTINUED | OUTPATIENT
Start: 2020-09-04 | End: 2020-09-04 | Stop reason: HOSPADM

## 2020-09-03 RX ORDER — NEBIVOLOL 5 MG/1
10 TABLET ORAL EVERY MORNING
Status: DISCONTINUED | OUTPATIENT
Start: 2020-09-04 | End: 2020-09-04 | Stop reason: HOSPADM

## 2020-09-03 RX ORDER — NITROGLYCERIN 20 MG/100ML
5-200 INJECTION INTRAVENOUS
Status: DISCONTINUED | OUTPATIENT
Start: 2020-09-03 | End: 2020-09-03

## 2020-09-03 RX ADMIN — SODIUM CHLORIDE 100 ML/HR: 9 INJECTION, SOLUTION INTRAVENOUS at 20:20

## 2020-09-03 RX ADMIN — NITROGLYCERIN 5 MCG/MIN: 20 INJECTION INTRAVENOUS at 16:41

## 2020-09-03 RX ADMIN — SODIUM CHLORIDE, PRESERVATIVE FREE 10 ML: 5 INJECTION INTRAVENOUS at 20:17

## 2020-09-03 RX ADMIN — ROSUVASTATIN CALCIUM 40 MG: 20 TABLET, FILM COATED ORAL at 20:17

## 2020-09-03 NOTE — ED PROVIDER NOTES
Subjective   Ms. Panda is a 39-year-old female with a history significant for high cholesterol, diabetes and hypertension.  She also is obese.  She denies smoking or illicit drug use.  She does have a strong family history of cardiac disease.  Approximately an hour and half prior to presentation she developed substernal chest pain with diaphoresis.  She called EMS who gave her nitroglycerin which did relieve her symptoms somewhat.          Review of Systems   Cardiovascular: Positive for chest pain.   All other systems reviewed and are negative.      Past Medical History:   Diagnosis Date   • Anxiety    • Cholelithiasis 2004   • Chronic fatigue    • Diabetes mellitus (CMS/HCC)    • High cholesterol    • Hypertension 1999   • PCOS (polycystic ovarian syndrome)    • PONV (postoperative nausea and vomiting) 2004   • Rheumatoid arthritis (CMS/HCC)        Allergies   Allergen Reactions   • Penicillins Hives       Past Surgical History:   Procedure Laterality Date   • CHOLECYSTECTOMY      lap   • ENDOSCOPY N/A 6/19/2020    Procedure: ESOPHAGOGASTRODUODENOSCOPY WITH ANESTHESIA;  Surgeon: Kermit Marlow MD;  Location: [facility] ENDOSCOPY;  Service: General;  Laterality: N/A;  pre: pre op scope  post op: gastritis, polyp  PCP: Gayatri Aquino MD   • WISDOM TOOTH EXTRACTION         Family History   Problem Relation Age of Onset   • Cancer Mother    • Hypertension Mother    • Obesity Mother    • Diabetes Mother    • Colon cancer Mother    • COPD Father    • Diabetes Father    • Obesity Father    • Hypertension Father    • Arthritis Father    • Depression Father    • Hearing loss Father    • Breast cancer Neg Hx    • Ovarian cancer Neg Hx    • Uterine cancer Neg Hx        Social History     Socioeconomic History   • Marital status:      Spouse name: Not on file   • Number of children: Not on file   • Years of education: Not on file   • Highest education level: Not on file   Tobacco Use   • Smoking status: Former Smoker      Packs/day: 0.50     Years: 8.00     Pack years: 4.00     Types: Cigarettes     Start date: 1999     Last attempt to quit: 3/15/2006     Years since quittin.4   • Smokeless tobacco: Never Used   Substance and Sexual Activity   • Alcohol use: Not Currently     Frequency: Never   • Drug use: Never   • Sexual activity: Yes     Partners: Male     Birth control/protection: None           Objective   Physical Exam   Constitutional: She is oriented to person, place, and time. She appears well-developed and well-nourished.   HENT:   Head: Normocephalic and atraumatic.   Mouth/Throat: Oropharynx is clear and moist.   Eyes: Conjunctivae and EOM are normal.   Neck: Normal range of motion. Neck supple.   Cardiovascular: Normal rate, regular rhythm, normal heart sounds and intact distal pulses.   Pulmonary/Chest: Effort normal and breath sounds normal.   Abdominal: Soft. Bowel sounds are normal.   Musculoskeletal: Normal range of motion.   Neurological: She is alert and oriented to person, place, and time.   Skin: Skin is warm and dry. Capillary refill takes less than 2 seconds.   Psychiatric: She has a normal mood and affect. Her behavior is normal. Judgment and thought content normal.   Nursing note and vitals reviewed.      Procedures           ED Course                                           MDM  Number of Diagnoses or Management Options     Amount and/or Complexity of Data Reviewed  Clinical lab tests: ordered and reviewed  Tests in the radiology section of CPT®: ordered and reviewed    Patient Progress  Patient progress: stable      Final diagnoses:   ST elevation myocardial infarction (STEMI), unspecified artery (CMS/HCC)     The patient's EKG did suggest ST elevation MI and in the ED her symptoms recurred with worsening of the ST elevations and runs of brief episodes of V. tach and frequent PVCs.  I discussed the case with Dr. Nixon who agreed that she needed to go to the Cath Lab.  She is currently in  guarded condition       Yeison Moreira MD  09/03/20 [id number]

## 2020-09-03 NOTE — H&P
Chief Complaint   Patient presents with   • Chest Pain     HPI: This is a 39-year-old female with a history of type 2 diabetes mellitus, hypertension, hyperlipidemia who presents with chest pain.  Patient developed an acute onset of chest pain about 2 hours prior to arrival.  Substernal in location, severe, nonradiating and with associated diaphoresis.  Patient did not take anything for the pain but was given nitroglycerin on the way to the hospital which helped the pain mildly.  Patient has never had pain like this before.  Recently, she has been in her usual state of health.  She was scheduled for possible bariatric surgery in the near future.  At this time, she is still experiencing severe pain.    Chest Pain    This is a new problem. The current episode started today. The onset quality is sudden. The problem occurs constantly. The problem has been waxing and waning. The pain is present in the substernal region. The pain is severe. The quality of the pain is described as pressure. The pain does not radiate. Associated symptoms include diaphoresis. Pertinent negatives include no abdominal pain, back pain, claudication, cough, dizziness, fever, headaches, hemoptysis, irregular heartbeat, nausea, numbness, orthopnea, palpitations, PND, shortness of breath, syncope or vomiting. The pain is aggravated by nothing. She has tried nothing for the symptoms. Risk factors include obesity.   Her family medical history is significant for CAD.     Past Medical History:   Diagnosis Date   • Anxiety    • Cholelithiasis 2004   • Chronic fatigue    • Diabetes mellitus (CMS/HCC)    • High cholesterol    • Hypertension 1999   • PCOS (polycystic ovarian syndrome)    • PONV (postoperative nausea and vomiting) 2004   • Rheumatoid arthritis (CMS/HCC)      Past Surgical History:   Procedure Laterality Date   • CHOLECYSTECTOMY      lap   • ENDOSCOPY N/A 6/19/2020    Procedure: ESOPHAGOGASTRODUODENOSCOPY WITH ANESTHESIA;  Surgeon: Kashmir  Kermit ROBERTSON MD;  Location: [facility] ENDOSCOPY;  Service: General;  Laterality: N/A;  pre: pre op scope  post op: gastritis, polyp  PCP: Gayatri Aquino MD   • WISDOM TOOTH EXTRACTION       Prior to Admission medications    Medication Sig Start Date End Date Taking? Authorizing Provider   aprepitant (EMEND) 40 MG capsule Take 1 capsule by mouth Daily. Please take the day of surgery as directed 9/1/20   Kermit Marlow MD   cholecalciferol (VITAMIN D3) 25 MCG (1000 UT) tablet Take 1,000 Units by mouth Daily.    ProviderJosselin MD   coenzyme Q10 100 MG capsule Take 100 mg by mouth Daily.    ProviderJosselin MD   Cyanocobalamin (VITAMIN B 12 PO) Take  by mouth.    ProviderJosselin MD   FLUoxetine (PROzac) 20 MG capsule Take 1 capsule by mouth Daily. 8/19/20      hydroCHLOROthiazide (HYDRODIURIL) 12.5 MG tablet Take 1 tablet by mouth every day with olmesartan 2/12/20   Gayatri Aquino MD   Insulin Glargine, 1 Unit Dial, (TOUJEO) 300 UNIT/ML solution pen-injector injection Inject 50 Units into the skin nightly 8/11/20      Insulin Pen Needle 32G X 4 MM misc Use one daily 1/6/20   Gayatri Aquino MD   Multiple Vitamins-Minerals (MULTIVITAMIN WITH MINERALS) tablet tablet Take 1 tablet by mouth Daily.    ProviderJosselin MD   nebivolol (BYSTOLIC) 10 MG tablet Take 1 tablet by mouth every morning 7/14/20      norethindrone-ethinyl estradiol FE (Microgestin FE 1/20) 1-20 MG-MCG per tablet Take 1 tablet by mouth Daily. 6/26/20   Judy Pascal APRN   olmesartan (BENICAR) 40 MG tablet Take 1 tablet by mouth every day with hydrochlorothiazide. Replaces combo tablet 2/12/20   Gayatri Aquino MD   olmesartan-hydrochlorothiazide (BENICAR HCT) 40-12.5 MG per tablet Take 1 tablet by mouth daily 7/13/20      pantoprazole (PROTONIX) 40 MG EC tablet Take 1 tablet by mouth Daily. 6/19/20   Kermit Marlow MD   rosuvastatin (CRESTOR) 40 MG tablet Take 1 tablet by mouth daily 6/20/20        Allergies   Allergen  Reactions   • Penicillins Hives     Social History     Tobacco Use   • Smoking status: Former Smoker     Packs/day: 0.50     Years: 8.00     Pack years: 4.00     Types: Cigarettes     Start date: 1999     Last attempt to quit: 3/15/2006     Years since quittin.4   • Smokeless tobacco: Never Used   Substance Use Topics   • Alcohol use: Not Currently     Frequency: Never     Family History   Problem Relation Age of Onset   • Cancer Mother    • Hypertension Mother    • Obesity Mother    • Diabetes Mother    • Colon cancer Mother    • COPD Father    • Diabetes Father    • Obesity Father    • Hypertension Father    • Arthritis Father    • Depression Father    • Hearing loss Father    • Breast cancer Neg Hx    • Ovarian cancer Neg Hx    • Uterine cancer Neg Hx      Review of Systems   Constitution: Positive for diaphoresis. Negative for chills, fever, night sweats and weight loss.   HENT: Negative for congestion and hearing loss.    Eyes: Negative for blurred vision and pain.   Cardiovascular: Positive for chest pain. Negative for claudication, dyspnea on exertion, irregular heartbeat, leg swelling, orthopnea, palpitations, paroxysmal nocturnal dyspnea and syncope.   Respiratory: Negative for cough, hemoptysis, shortness of breath and wheezing.    Endocrine: Negative for cold intolerance, heat intolerance, polydipsia and polyuria.   Hematologic/Lymphatic: Negative for adenopathy and bleeding problem. Does not bruise/bleed easily.   Skin: Negative for color change, poor wound healing and rash.   Musculoskeletal: Negative for arthritis, back pain, joint pain, joint swelling, myalgias and neck pain.   Gastrointestinal: Negative for abdominal pain, change in bowel habit, constipation, diarrhea, heartburn, hematochezia, melena, nausea and vomiting.   Genitourinary: Negative for dysuria, frequency, hematuria and nocturia.   Neurological: Negative for dizziness, focal weakness, headaches, light-headedness, loss of  "balance and numbness.   Psychiatric/Behavioral: Negative for altered mental status, memory loss and substance abuse.   Allergic/Immunologic: Negative for hives and persistent infections.     Physical Exam:  /88   Pulse 74   Temp 98.1 °F (36.7 °C)   Resp 16   Ht 160 cm (63\")   Wt 112 kg (247 lb 9.6 oz)   SpO2 97%   BMI 43.86 kg/m²   Temp:  [98.1 °F (36.7 °C)] 98.1 °F (36.7 °C)  Heart Rate:  [74] 74  Resp:  [16] 16  BP: (131)/(88) 131/88    Physical Exam   Constitutional: She is oriented to person, place, and time. Vital signs are normal. She appears well-developed and well-nourished. She is cooperative.  Non-toxic appearance. She appears distressed.   HENT:   Head: Normocephalic and atraumatic.   Right Ear: External ear normal.   Left Ear: External ear normal.   Nose: Nose normal.   Mouth/Throat: Uvula is midline, oropharynx is clear and moist and mucous membranes are normal. Mucous membranes are not pale, not dry and not cyanotic. No oropharyngeal exudate.   Eyes: Pupils are equal, round, and reactive to light. EOM and lids are normal.   Neck: Normal range of motion. Neck supple. No hepatojugular reflux and no JVD present. Carotid bruit is not present. No tracheal deviation and no edema present. No thyroid mass and no thyromegaly present.   Cardiovascular: Normal rate, regular rhythm, S1 normal, S2 normal, normal heart sounds, intact distal pulses and normal pulses.  No extrasystoles are present. PMI is not displaced. Exam reveals no gallop and no friction rub.   No murmur heard.  Pulses:       Radial pulses are 2+ on the right side, and 2+ on the left side.        Femoral pulses are 2+ on the right side, and 2+ on the left side.       Dorsalis pedis pulses are 2+ on the right side, and 2+ on the left side.        Posterior tibial pulses are 2+ on the right side, and 2+ on the left side.   Pulmonary/Chest: Effort normal and breath sounds normal. No accessory muscle usage. No respiratory distress. She " has no wheezes. She has no rales. She exhibits no tenderness.   Abdominal: Soft. Normal appearance and bowel sounds are normal. She exhibits no distension, no abdominal bruit and no pulsatile midline mass. There is no hepatosplenomegaly. There is no tenderness.   Musculoskeletal: Normal range of motion. She exhibits no edema, tenderness or deformity.   Lymphadenopathy:     She has no cervical adenopathy.   Neurological: She is oriented to person, place, and time. She has normal strength. No cranial nerve deficit.   Skin: Skin is warm, dry and intact. No rash noted. She is not diaphoretic. No cyanosis or erythema. Nails show no clubbing.   Psychiatric: She has a normal mood and affect. Her speech is normal and behavior is normal. Thought content normal.   Vitals reviewed.    Diagnostic Data:    No labs available at the time of my initial evaluation.    ECG: Sinus rhythm with ST elevation in V1, V2, V3, V4 with slight ST depression in 3 and aVF    ASSESSMENT/PLAN:    1.  Acute anterior STEMI  2.  Essential hypertension  3.  Mixed hyperlipidemia  4.  Type 2 diabetes mellitus, insulin requiring, without obvious diabetic complication    -Given this patient's presentation with ongoing chest pain concern for acute anterior STEMI based on the EKG, we will perform cardiac catheterization at this time.  -I discussed cardiac catheterization, the procedure, risks (including bleeding, infection, vascular damage [including minor oozing, bruising, bleeding, and up to and including the need for vascular surgery], contrast reaction, renal failure, respiratory failure, heart attack, stroke, arrhythmia and even death), benefits, and alternatives and the patient has voiced understanding and is willing to proceed.  -Further plans pending the results of the patient's cardiac catheterization.

## 2020-09-04 ENCOUNTER — APPOINTMENT (OUTPATIENT)
Dept: CARDIOLOGY | Facility: HOSPITAL | Age: 40
End: 2020-09-04

## 2020-09-04 ENCOUNTER — TELEPHONE (OUTPATIENT)
Dept: BARIATRICS/WEIGHT MGMT | Facility: CLINIC | Age: 40
End: 2020-09-04

## 2020-09-04 ENCOUNTER — EPISODE CHANGES (OUTPATIENT)
Dept: CASE MANAGEMENT | Facility: OTHER | Age: 40
End: 2020-09-04

## 2020-09-04 VITALS
BODY MASS INDEX: 44.22 KG/M2 | DIASTOLIC BLOOD PRESSURE: 80 MMHG | SYSTOLIC BLOOD PRESSURE: 136 MMHG | WEIGHT: 249.56 LBS | OXYGEN SATURATION: 97 % | TEMPERATURE: 98.3 F | RESPIRATION RATE: 14 BRPM | HEIGHT: 63 IN | HEART RATE: 83 BPM

## 2020-09-04 LAB
ANION GAP SERPL CALCULATED.3IONS-SCNC: 14 MMOL/L (ref 5–15)
BH CV ECHO MEAS - AO MAX PG (FULL): 3 MMHG
BH CV ECHO MEAS - AO MAX PG: 7.3 MMHG
BH CV ECHO MEAS - AO MEAN PG (FULL): 2 MMHG
BH CV ECHO MEAS - AO MEAN PG: 5 MMHG
BH CV ECHO MEAS - AO ROOT AREA (BSA CORRECTED): 1.4
BH CV ECHO MEAS - AO ROOT AREA: 6.6 CM^2
BH CV ECHO MEAS - AO ROOT DIAM: 2.9 CM
BH CV ECHO MEAS - AO V2 MAX: 135 CM/SEC
BH CV ECHO MEAS - AO V2 MEAN: 104 CM/SEC
BH CV ECHO MEAS - AO V2 VTI: 28.4 CM
BH CV ECHO MEAS - AVA(I,A): 2.5 CM^2
BH CV ECHO MEAS - AVA(I,D): 2.5 CM^2
BH CV ECHO MEAS - AVA(V,A): 2.6 CM^2
BH CV ECHO MEAS - AVA(V,D): 2.6 CM^2
BH CV ECHO MEAS - BSA(HAYCOCK): 2.3 M^2
BH CV ECHO MEAS - BSA: 2.1 M^2
BH CV ECHO MEAS - BZI_BMI: 44.1 KILOGRAMS/M^2
BH CV ECHO MEAS - BZI_METRIC_HEIGHT: 160 CM
BH CV ECHO MEAS - BZI_METRIC_WEIGHT: 112.9 KG
BH CV ECHO MEAS - EDV(CUBED): 108.5 ML
BH CV ECHO MEAS - EDV(MOD-SP4): 95.2 ML
BH CV ECHO MEAS - EDV(TEICH): 106 ML
BH CV ECHO MEAS - EF(CUBED): 51.4 %
BH CV ECHO MEAS - EF(MOD-SP4): 57.7 %
BH CV ECHO MEAS - EF(TEICH): 43.4 %
BH CV ECHO MEAS - ESV(CUBED): 52.7 ML
BH CV ECHO MEAS - ESV(MOD-SP4): 40.3 ML
BH CV ECHO MEAS - ESV(TEICH): 60 ML
BH CV ECHO MEAS - FS: 21.4 %
BH CV ECHO MEAS - IVS/LVPW: 1.2
BH CV ECHO MEAS - IVSD: 1.2 CM
BH CV ECHO MEAS - LA DIMENSION: 2.5 CM
BH CV ECHO MEAS - LA/AO: 0.86
BH CV ECHO MEAS - LAT PEAK E' VEL: 9.1 CM/SEC
BH CV ECHO MEAS - LV DIASTOLIC VOL/BSA (35-75): 44.9 ML/M^2
BH CV ECHO MEAS - LV MASS(C)D: 185.5 GRAMS
BH CV ECHO MEAS - LV MASS(C)DI: 87.4 GRAMS/M^2
BH CV ECHO MEAS - LV MAX PG: 4.2 MMHG
BH CV ECHO MEAS - LV MEAN PG: 3 MMHG
BH CV ECHO MEAS - LV SYSTOLIC VOL/BSA (12-30): 19 ML/M^2
BH CV ECHO MEAS - LV V1 MAX: 103 CM/SEC
BH CV ECHO MEAS - LV V1 MEAN: 81.1 CM/SEC
BH CV ECHO MEAS - LV V1 VTI: 20.5 CM
BH CV ECHO MEAS - LVIDD: 4.8 CM
BH CV ECHO MEAS - LVIDS: 3.8 CM
BH CV ECHO MEAS - LVLD AP4: 8 CM
BH CV ECHO MEAS - LVLS AP4: 6.6 CM
BH CV ECHO MEAS - LVOT AREA (M): 3.5 CM^2
BH CV ECHO MEAS - LVOT AREA: 3.5 CM^2
BH CV ECHO MEAS - LVOT DIAM: 2.1 CM
BH CV ECHO MEAS - LVPWD: 0.97 CM
BH CV ECHO MEAS - MED PEAK E' VEL: 5.22 CM/SEC
BH CV ECHO MEAS - MV A MAX VEL: 71.3 CM/SEC
BH CV ECHO MEAS - MV DEC TIME: 0.19 SEC
BH CV ECHO MEAS - MV E MAX VEL: 85.4 CM/SEC
BH CV ECHO MEAS - MV E/A: 1.2
BH CV ECHO MEAS - PA MAX PG: 1.2 MMHG
BH CV ECHO MEAS - PA V2 MAX: 55.3 CM/SEC
BH CV ECHO MEAS - SI(AO): 88.4 ML/M^2
BH CV ECHO MEAS - SI(CUBED): 26.3 ML/M^2
BH CV ECHO MEAS - SI(LVOT): 33.5 ML/M^2
BH CV ECHO MEAS - SI(MOD-SP4): 25.9 ML/M^2
BH CV ECHO MEAS - SI(TEICH): 21.6 ML/M^2
BH CV ECHO MEAS - SV(AO): 187.6 ML
BH CV ECHO MEAS - SV(CUBED): 55.8 ML
BH CV ECHO MEAS - SV(LVOT): 71 ML
BH CV ECHO MEAS - SV(MOD-SP4): 54.9 ML
BH CV ECHO MEAS - SV(TEICH): 45.9 ML
BH CV ECHO MEASUREMENTS AVERAGE E/E' RATIO: 11.93
BUN SERPL-MCNC: 9 MG/DL (ref 6–20)
BUN/CREAT SERPL: 18 (ref 7–25)
CALCIUM SPEC-SCNC: 8.7 MG/DL (ref 8.6–10.5)
CHLORIDE SERPL-SCNC: 101 MMOL/L (ref 98–107)
CHOLEST SERPL-MCNC: 130 MG/DL (ref 0–200)
CO2 SERPL-SCNC: 25 MMOL/L (ref 22–29)
CREAT SERPL-MCNC: 0.5 MG/DL (ref 0.57–1)
DEPRECATED RDW RBC AUTO: 37.4 FL (ref 37–54)
ERYTHROCYTE [DISTWIDTH] IN BLOOD BY AUTOMATED COUNT: 12.1 % (ref 12.3–15.4)
GFR SERPL CREATININE-BSD FRML MDRD: 137 ML/MIN/1.73
GLUCOSE BLDC GLUCOMTR-MCNC: 131 MG/DL (ref 70–130)
GLUCOSE BLDC GLUCOMTR-MCNC: 138 MG/DL (ref 70–130)
GLUCOSE SERPL-MCNC: 116 MG/DL (ref 65–99)
HBA1C MFR BLD: 7.7 % (ref 4.8–5.6)
HCT VFR BLD AUTO: 35.1 % (ref 34–46.6)
HDLC SERPL-MCNC: 41 MG/DL (ref 40–60)
HGB BLD-MCNC: 12 G/DL (ref 12–15.9)
LDLC SERPL CALC-MCNC: 63 MG/DL (ref 0–100)
LDLC/HDLC SERPL: 1.53 {RATIO}
LEFT ATRIUM VOLUME INDEX: 20 ML/M2
LEFT ATRIUM VOLUME: 42.4 CM3
MAXIMAL PREDICTED HEART RATE: 181 BPM
MCH RBC QN AUTO: 29 PG (ref 26.6–33)
MCHC RBC AUTO-ENTMCNC: 34.2 G/DL (ref 31.5–35.7)
MCV RBC AUTO: 84.8 FL (ref 79–97)
PLATELET # BLD AUTO: 231 10*3/MM3 (ref 140–450)
PMV BLD AUTO: 12.2 FL (ref 6–12)
POTASSIUM SERPL-SCNC: 3.6 MMOL/L (ref 3.5–5.2)
RBC # BLD AUTO: 4.14 10*6/MM3 (ref 3.77–5.28)
SODIUM SERPL-SCNC: 140 MMOL/L (ref 136–145)
STRESS TARGET HR: 154 BPM
TRIGL SERPL-MCNC: 132 MG/DL (ref 0–150)
VLDLC SERPL-MCNC: 26.4 MG/DL
WBC # BLD AUTO: 8.38 10*3/MM3 (ref 3.4–10.8)

## 2020-09-04 PROCEDURE — 25010000002 ENOXAPARIN PER 10 MG: Performed by: INTERNAL MEDICINE

## 2020-09-04 PROCEDURE — 93306 TTE W/DOPPLER COMPLETE: CPT | Performed by: INTERNAL MEDICINE

## 2020-09-04 PROCEDURE — 93306 TTE W/DOPPLER COMPLETE: CPT

## 2020-09-04 PROCEDURE — 80061 LIPID PANEL: CPT | Performed by: INTERNAL MEDICINE

## 2020-09-04 PROCEDURE — 82962 GLUCOSE BLOOD TEST: CPT

## 2020-09-04 PROCEDURE — 80048 BASIC METABOLIC PNL TOTAL CA: CPT | Performed by: INTERNAL MEDICINE

## 2020-09-04 PROCEDURE — 85027 COMPLETE CBC AUTOMATED: CPT | Performed by: INTERNAL MEDICINE

## 2020-09-04 PROCEDURE — 83036 HEMOGLOBIN GLYCOSYLATED A1C: CPT | Performed by: INTERNAL MEDICINE

## 2020-09-04 PROCEDURE — 99238 HOSP IP/OBS DSCHRG MGMT 30/<: CPT | Performed by: INTERNAL MEDICINE

## 2020-09-04 PROCEDURE — 25010000002 PERFLUTREN 6.52 MG/ML SUSPENSION: Performed by: INTERNAL MEDICINE

## 2020-09-04 RX ORDER — PANTOPRAZOLE SODIUM 40 MG/1
40 TABLET, DELAYED RELEASE ORAL DAILY
Qty: 30 TABLET | Refills: 11 | Status: SHIPPED | OUTPATIENT
Start: 2020-09-04 | End: 2020-10-12 | Stop reason: SDUPTHER

## 2020-09-04 RX ORDER — HYDROCHLOROTHIAZIDE 12.5 MG/1
12.5 TABLET ORAL DAILY
Qty: 30 TABLET | Refills: 11 | Status: SHIPPED | OUTPATIENT
Start: 2020-09-05 | End: 2020-10-12 | Stop reason: SDUPTHER

## 2020-09-04 RX ORDER — NEBIVOLOL 10 MG/1
10 TABLET ORAL EVERY MORNING
Qty: 30 TABLET | Refills: 11 | Status: SHIPPED | OUTPATIENT
Start: 2020-09-05 | End: 2020-10-12 | Stop reason: SDUPTHER

## 2020-09-04 RX ORDER — ROSUVASTATIN CALCIUM 40 MG/1
40 TABLET, COATED ORAL NIGHTLY
Qty: 30 TABLET | Refills: 11 | Status: SHIPPED | OUTPATIENT
Start: 2020-09-04 | End: 2020-10-12

## 2020-09-04 RX ORDER — LOSARTAN POTASSIUM 100 MG/1
100 TABLET ORAL
Qty: 30 TABLET | Refills: 11 | Status: SHIPPED | OUTPATIENT
Start: 2020-09-05 | End: 2020-10-12 | Stop reason: SDUPTHER

## 2020-09-04 RX ORDER — ASPIRIN 81 MG/1
81 TABLET ORAL DAILY
Qty: 30 TABLET | Refills: 11 | Status: SHIPPED | OUTPATIENT
Start: 2020-09-05

## 2020-09-04 RX ORDER — NITROGLYCERIN 0.4 MG/1
0.4 TABLET SUBLINGUAL
Qty: 25 TABLET | Refills: 12 | Status: SHIPPED | OUTPATIENT
Start: 2020-09-04

## 2020-09-04 RX ORDER — FLUOXETINE HYDROCHLORIDE 20 MG/1
20 CAPSULE ORAL DAILY
Qty: 30 CAPSULE | Refills: 11 | Status: SHIPPED | OUTPATIENT
Start: 2020-09-05 | End: 2020-10-12 | Stop reason: SDUPTHER

## 2020-09-04 RX ADMIN — PERFLUTREN 9.78 MG: 6.52 INJECTION, SUSPENSION INTRAVENOUS at 12:04

## 2020-09-04 RX ADMIN — LOSARTAN POTASSIUM 100 MG: 50 TABLET, FILM COATED ORAL at 08:00

## 2020-09-04 RX ADMIN — ASPIRIN 81 MG: 81 TABLET ORAL at 08:01

## 2020-09-04 RX ADMIN — HYDROCHLOROTHIAZIDE 12.5 MG: 25 TABLET ORAL at 08:00

## 2020-09-04 RX ADMIN — SODIUM CHLORIDE, PRESERVATIVE FREE 10 ML: 5 INJECTION INTRAVENOUS at 08:01

## 2020-09-04 RX ADMIN — TICAGRELOR 90 MG: 90 TABLET ORAL at 05:19

## 2020-09-04 RX ADMIN — PANTOPRAZOLE SODIUM 40 MG: 40 TABLET, DELAYED RELEASE ORAL at 05:19

## 2020-09-04 RX ADMIN — FLUOXETINE HYDROCHLORIDE 20 MG: 20 CAPSULE ORAL at 08:01

## 2020-09-04 RX ADMIN — NEBIVOLOL HYDROCHLORIDE 10 MG: 5 TABLET ORAL at 08:01

## 2020-09-04 RX ADMIN — ENOXAPARIN SODIUM 40 MG: 40 INJECTION SUBCUTANEOUS at 05:19

## 2020-09-04 NOTE — DISCHARGE SUMMARY
"Date of Admission: 09/03/20  Date of Discharge: 09/04/20    Admission Diagnosis:  1.  Acute anterior ST segment elevation myocardial infarction    Discharge Diagnosis:  1.  Same as above    Consults: None    Procedures:  1.  Cardiac catheterization and PCI to the LAD on the day of admission with Xience drug-eluting stent  2.  Echocardiogram on the day of discharge showing left ventricular ejection fraction of 50-55%    HPI: This patient presented with acute onset chest pain and was found to have an acute anterior ST segment elevation myocardial infarction and was urgently taken to the cardiac catheterization lab.    Hospital Course: The patient was brought emergently to the cardiac catheterization lab and underwent coronary angiography as well as PCI to the LAD with Xience drug-eluting stent.  She was monitored overnight.  Her cardiac rhythm remained stable.  She was examined on the day of discharge and transferred to the telemetry floor.  She has been ambulatory throughout the day and has done well.  She had an echocardiogram showing low normal systolic function.  The patient is stable at this time for discharge home.    Discharge Day Exam:    S: No recurrent chest pain.  No acute events overnight.    O:  /80 (BP Location: Left arm, Patient Position: Lying)   Pulse 83   Temp 98.3 °F (36.8 °C) (Oral)   Resp 14   Ht 160 cm (63\")   Wt 113 kg (249 lb 9 oz)   SpO2 97%   BMI 44.21 kg/m²   Temp:  [98.1 °F (36.7 °C)-98.4 °F (36.9 °C)] 98.3 °F (36.8 °C)  Heart Rate:  [69-91] 83  Resp:  [14-17] 14  BP: ()/() 136/80    Gen: NAD  CV: RRR, no mrg  Pulm: CTAB  GI: s, nt, nd, +bs  Ext: no edema, right femoral cath site ok    Labs:    Lab Results   Component Value Date    WBC 8.38 09/04/2020    HGB 12.0 09/04/2020    HCT 35.1 09/04/2020    MCV 84.8 09/04/2020     09/04/2020     Lab Results   Component Value Date    GLUCOSE 116 (H) 09/04/2020    CALCIUM 8.7 09/04/2020     09/04/2020    K 3.6 " 09/04/2020    CO2 25.0 09/04/2020     09/04/2020    BUN 9 09/04/2020    CREATININE 0.50 (L) 09/04/2020    EGFRIFNONA 137 09/04/2020    BCR 18.0 09/04/2020    ANIONGAP 14.0 09/04/2020     Lab Results   Component Value Date    CHOL 130 09/04/2020    CHLPL 157 05/17/2016    TRIG 132 09/04/2020    HDL 41 09/04/2020    LDL 63 09/04/2020     A1C: 7.7    Discharge Medications:       Discharge Medications      New Medications      Instructions Start Date   aspirin 81 MG EC tablet   81 mg, Oral, Daily   Start Date:  September 5, 2020     losartan 100 MG tablet  Commonly known as:  COZAAR  Replaces:  olmesartan 40 MG tablet   100 mg, Oral, Every 24 Hours Scheduled   Start Date:  September 5, 2020     nitroglycerin 0.4 MG SL tablet  Commonly known as:  NITROSTAT   0.4 mg, Sublingual, Every 5 Minutes PRN, Take no more than 3 doses in 15 minutes.      ticagrelor 90 MG tablet tablet  Commonly known as:  BRILINTA   90 mg, Oral, Every 12 Hours Scheduled         Changes to Medications      Instructions Start Date   Bystolic 10 MG tablet  Generic drug:  nebivolol  What changed:  Another medication with the same name was added. Make sure you understand how and when to take each.   10 mg, Oral, Every Morning      nebivolol 10 MG tablet  Commonly known as:  BYSTOLIC  What changed:  You were already taking a medication with the same name, and this prescription was added. Make sure you understand how and when to take each.   10 mg, Oral, Every Morning   Start Date:  September 5, 2020     FLUoxetine 20 MG capsule  Commonly known as:  PROzac  What changed:  Another medication with the same name was added. Make sure you understand how and when to take each.   20 mg, Oral, Daily      FLUoxetine 20 MG capsule  Commonly known as:  PROzac  What changed:  You were already taking a medication with the same name, and this prescription was added. Make sure you understand how and when to take each.   20 mg, Oral, Daily   Start Date:  September  5, 2020     hydroCHLOROthiazide 12.5 MG tablet  Commonly known as:  HYDRODIURIL  What changed:    · how much to take  · how to take this  · when to take this   12.5 mg, Oral, Daily   Start Date:  September 5, 2020     pantoprazole 40 MG EC tablet  Commonly known as:  PROTONIX  What changed:  Another medication with the same name was added. Make sure you understand how and when to take each.   40 mg, Oral, Daily      pantoprazole 40 MG EC tablet  Commonly known as:  PROTONIX  What changed:  You were already taking a medication with the same name, and this prescription was added. Make sure you understand how and when to take each.   40 mg, Oral, Daily      rosuvastatin 40 MG tablet  Commonly known as:  CRESTOR  What changed:  Another medication with the same name was added. Make sure you understand how and when to take each.   40 mg, Oral, Daily      rosuvastatin 40 MG tablet  Commonly known as:  CRESTOR  What changed:  You were already taking a medication with the same name, and this prescription was added. Make sure you understand how and when to take each.   40 mg, Oral, Nightly      Toujeo SoloStar 300 UNIT/ML solution pen-injector injection  Generic drug:  Insulin Glargine (1 Unit Dial)  What changed:    · how much to take  · when to take this   Subcutaneous         Continue These Medications      Instructions Start Date   aprepitant 40 MG capsule  Commonly known as:  EMEND   40 mg, Oral, Daily, Please take the day of surgery as directed      BD Pen Needle Es U/F 32G X 4 MM misc  Generic drug:  Insulin Pen Needle   Use one daily      Blisovi FE 1/20 1-20 MG-MCG per tablet  Generic drug:  norethindrone-ethinyl estradiol FE   1 tablet, Oral, Daily      cholecalciferol 25 MCG (1000 UT) tablet  Commonly known as:  VITAMIN D3   1,000 Units, Oral, Daily      coenzyme Q10 100 MG capsule   100 mg, Oral, Daily      multivitamin with minerals tablet tablet   1 tablet, Oral, Daily      olmesartan-hydrochlorothiazide  40-12.5 MG per tablet  Commonly known as:  BENICAR HCT   1 tablet, Oral, Daily      Vitamin B 12 500 MCG tablet   1 tablet, Oral, Daily         Stop These Medications    olmesartan 40 MG tablet  Commonly known as:  BENICAR  Replaced by:  losartan 100 MG tablet          Discharge Instructions: Patient was instructed about medications, including dual antiplatelet therapies and the need for absolute compliance without any missed doses.  Patient was instructed about weight restrictions for 1 week of 5 pounds, gradually increase activity, no tub baths for 1 week.    Follow-up care: PCP in 1-2 weeks.  Cardiology clinic in 4 weeks.    Disposition: Home in stable condition.

## 2020-09-04 NOTE — TELEPHONE ENCOUNTER
Patient called in and spoke to Thelma and requested that I call her back to discuss her surgery.    Returned call to patient. Patient informed me that she was currently in the hospital and that she had a heart attack yesterday. I told the patient I would make sure Dr. Marlow knew about her heart attack and that we would be putting her surgery on hold and to give us a call when she is out of the hospital and we would get her in for a check up with Dr. Marlow and go from there.

## 2020-09-04 NOTE — PAYOR COMM NOTE
"9/4/20 [facility] [phone]  [phone]    PATIENT ER ADMISSION TO INPATIENT ON 9/3/20.      VIRY CLINICAL FOR REVIEW    Harry Panda (39 y.o. Female)     Date of Birth Social Security Number Address Home Phone MRN    1980  [id number] [facility] [id number] [phone] [id number]    [place] Marital Status          Spiritism        Admission Date Admission Type Admitting Provider Attending Provider Department, Room/Bed    9/3/20 Emergency Guille Nixon MD Faulkner, Michael Wade, MD [facility] 4B, 440/1    Discharge Date Discharge Disposition Discharge Destination                       Attending Provider:  Guille Nixon MD    Allergies:  Penicillins    Isolation:  None   Infection:  None   Code Status:  CPR    Ht:  160 cm (63\")   Wt:  113 kg (249 lb 9 oz)    Admission Cmt:  None   Principal Problem:  None                Active Insurance as of 9/3/2020     Primary Coverage     Payor Plan Insurance Group Employer/Plan Group    [facility] [employer] [id number]     Payor Plan Address Payor Plan Phone Number Payor Plan Fax Number Effective Dates    [address] [phone]  1/1/2018 - None Entered    [place] [id number]       Subscriber Name Subscriber Birth Date Member ID       HARRY PANDA 1980 [id number]                 Emergency Contacts      (Rel.) Home Phone Work Phone Mobile Phone    Mitchel Panda (Spouse) [phone] -- [phone]        Guille Nixon MD   Physician   Cardiology   H&P   Signed   Date of Service:  09/03/20 1645   Creation Time:  09/03/20 1737            Signed        Expand All Collapse All      Show:Clear all  [x]Manual[x]Template[]Copied    Added by:  [x]Guille Nixon MD    []Trav for details     Chief Complaint   Patient presents with   • Chest Pain      HPI: This is a 39-year-old female with a history of type 2 diabetes mellitus, " hypertension, hyperlipidemia who presents with chest pain.  Patient developed an acute onset of chest pain about 2 hours prior to arrival.  Substernal in location, severe, nonradiating and with associated diaphoresis.  Patient did not take anything for the pain but was given nitroglycerin on the way to the hospital which helped the pain mildly.  Patient has never had pain like this before.  Recently, she has been in her usual state of health.  She was scheduled for possible bariatric surgery in the near future.  At this time, she is still experiencing severe pain.     Chest Pain    This is a new problem. The current episode started today. The onset quality is sudden. The problem occurs constantly. The problem has been waxing and waning. The pain is present in the substernal region. The pain is severe. The quality of the pain is described as pressure. The pain does not radiate. Associated symptoms include diaphoresis. Pertinent negatives include no abdominal pain, back pain, claudication, cough, dizziness, fever, headaches, hemoptysis, irregular heartbeat, nausea, numbness, orthopnea, palpitations, PND, shortness of breath, syncope or vomiting. The pain is aggravated by nothing. She has tried nothing for the symptoms. Risk factors include obesity.   Her family medical history is significant for CAD.                  Past Medical History:   Diagnosis Date   • Anxiety     • Cholelithiasis 2004   • Chronic fatigue     • Diabetes mellitus (CMS/HCC)     • High cholesterol     • Hypertension 1999   • PCOS (polycystic ovarian syndrome)     • PONV (postoperative nausea and vomiting) 2004   • Rheumatoid arthritis (CMS/HCC)           Review of Systems   Constitution: Positive for diaphoresis. Negative for chills, fever, night sweats and weight loss.   HENT: Negative for congestion and hearing loss.    Eyes: Negative for blurred vision and pain.   Cardiovascular: Positive for chest pain. Negative for claudication, dyspnea on  "exertion, irregular heartbeat, leg swelling, orthopnea, palpitations, paroxysmal nocturnal dyspnea and syncope.   Respiratory: Negative for cough, hemoptysis, shortness of breath and wheezing.    Endocrine: Negative for cold intolerance, heat intolerance, polydipsia and polyuria.   Hematologic/Lymphatic: Negative for adenopathy and bleeding problem. Does not bruise/bleed easily.   Skin: Negative for color change, poor wound healing and rash.   Musculoskeletal: Negative for arthritis, back pain, joint pain, joint swelling, myalgias and neck pain.   Gastrointestinal: Negative for abdominal pain, change in bowel habit, constipation, diarrhea, heartburn, hematochezia, melena, nausea and vomiting.   Genitourinary: Negative for dysuria, frequency, hematuria and nocturia.   Neurological: Negative for dizziness, focal weakness, headaches, light-headedness, loss of balance and numbness.   Psychiatric/Behavioral: Negative for altered mental status, memory loss and substance abuse.   Allergic/Immunologic: Negative for hives and persistent infections.      Physical Exam:  /88   Pulse 74   Temp 98.1 °F (36.7 °C)   Resp 16   Ht 160 cm (63\")   Wt 112 kg (247 lb 9.6 oz)   SpO2 97%   BMI 43.86 kg/m²   Temp:  [98.1 °F (36.7 °C)] 98.1 °F (36.7 °C)  Heart Rate:  [74] 74  Resp:  [16] 16  BP: (131)/(88) 131/88     Physical Exam     Physical Exam   Constitutional: She is oriented to person, place, and time. Vital signs are normal. She appears well-developed and well-nourished. She is cooperative.  Non-toxic appearance. She appears distressed.   HENT:   Head: Normocephalic and atraumatic.   Right Ear: External ear normal.   Left Ear: External ear normal.   Nose: Nose normal.   Mouth/Throat: Uvula is midline, oropharynx is clear and moist and mucous membranes are normal. Mucous membranes are not pale, not dry and not cyanotic. No oropharyngeal exudate.   Eyes: Pupils are equal, round, and reactive to light. EOM and lids are " normal.   Neck: Normal range of motion. Neck supple. No hepatojugular reflux and no JVD present. Carotid bruit is not present. No tracheal deviation and no edema present. No thyroid mass and no thyromegaly present.   Cardiovascular: Normal rate, regular rhythm, S1 normal, S2 normal, normal heart sounds, intact distal pulses and normal pulses.  No extrasystoles are present. PMI is not displaced. Exam reveals no gallop and no friction rub.   No murmur heard.  Pulses:       Radial pulses are 2+ on the right side, and 2+ on the left side.        Femoral pulses are 2+ on the right side, and 2+ on the left side.       Dorsalis pedis pulses are 2+ on the right side, and 2+ on the left side.        Posterior tibial pulses are 2+ on the right side, and 2+ on the left side.   Pulmonary/Chest       Posterior tibial pulses are 2+ on the right side, and 2+ on the left side.   Pulmonary/Chest: Effort normal and breath sounds normal. No accessory muscle usage. No respiratory distress. She has no wheezes. She has no rales. She exhibits no tenderness.   Abdominal: Soft. Normal appearance and bowel sounds are normal. She exhibits no distension, no abdominal bruit and no pulsatile midline mass. There is no hepatosplenomegaly. There is no tenderness.   Musculoskeletal: Normal range of motion. She exhibits no edema, tenderness or deformity.   Lymphadenopathy:     She has no cervical adenopathy.   Neurological: She is oriented to person, place, and time. She has normal strength. No cranial nerve deficit.   Skin: Skin is warm, dry and intact. No rash noted. She is not diaphoretic. No cyanosis or erythema. Nails show no clubbing.   Psychiatric: She has a normal mood and affect. Her speech is normal and behavior is normal. Thought content normal.   Vitals reviewed.     labs available at the time of my initial evaluation.     ECG: Sinus rhythm with ST elevation in V1, V2, V3, V4 with slight ST depression in 3 and  "aVF     ASSESSMENT/PLAN:     1.  Acute anterior STEMI  2.  Essential hypertension  3.  Mixed hyperlipidemia  4.  Type 2 diabetes mellitus, insulin requiring, without obvious diabetic complication     -Given this patient's presentation with ongoing chest pain concern for acute anterior STEMI based on the EKG, we will perform cardiac catheterization at this time.  -I discussed cardiac catheterization, the procedure, risks (including bleeding, infection, vascular damage [including minor oozing, bruising, bleeding, and up to and including the need for vascular surgery], contrast reaction, renal failure, respiratory failure, heart attack, stroke, arrhythmia and even death), benefits, and alternatives and the patient has voiced understanding and is willing to proceed.  -Further plans pending the results of the patient's cardiac catheterization.                   [facility] CATH LAB  [address] [place] [id number] [phone]             Harry Panda   Cardiac Catheterization/Vascular Study   Order# [id number]   Reading physician: Guille Nixon MD Ordering physician: Guille Nixon MD Study date: 9/3/20    Procedures     Left Heart Cath   Percutaneous Coronary Intervention      Patient Information     Patient Name  Harry Panda MRN  [id number] Sex  Female  (Age)  1980 (39 y.o.)   Race Ethnicity Encounter Category   White or  Not  or  Emergency   Patient Hx Of Height, Weight, and Vitals     Height Weight BSA (Calculated - sq m) BMI (kg/m2) Pulse BP   160 cm (63\") 113 kg (248 lb 3.8 oz) 2.12 sq meters 44.07 91 97/73   Physicians     Panel Physicians Referring Physician Case Authorizing Physician   Guille Nixon MD (Primary)     Admission Information     Admission Date/Time Discharge Date/Time Room/Bed   20  1622  440/1   Pre-procedure Diagnosis     STEMI       Conclusion     Date of Procedure: 20     Procedures " Performed:     1.  Selective coronary angiography  2.  Acute myocardial infarction percutaneous coronary intervention to the left anterior descending artery  3.  Left heart catheterization  4.  Left ventriculography  5.  Administration and monitoring of conscious sedation during the procedure     Indication: Acute anterior STEMI     Risk, Benefits, and Alternatives discussed with the patient and/or family.  Plan is for moderate sedation, and the patient agrees to proceed with the procedure.  An immediate assessment was done prior to the administration of moderate sedation.     Access: 6 Portuguese right femoral artery, Mynx  Diagnostic Catheters: 6 Portuguese JL4, JR4     Procedural Details: After written and informed consent was obtained, the patient was brought to the cardiac catheterization lab in a fasting state. The skin overlying the patient's right groin was prepped and draped in the usual sterile fashion. Timeout was taken to confirm the correct patient and procedure. IV Versed and fentanyl were used to achieve conscious sedation. Lidocaine was administered for local anesthesia over the right femoral artery.  Modified Seldinger technique was then used to place a 6 Portuguese sheath in the right femoral artery. Diagnostic coronary angiography was then performed with 6 Portuguese JL4 and JR4 diagnostic coronary catheters. Multiple hand injected angiograms were performed. After this the decision was made to proceed with a percutaneous coronary intervention. The details of this procedure will be described separately below.  Following the PCI, a 6 Portuguese angled pigtail catheter was used to cross the aortic valve.  Hemodynamic measurements were made in the left ventricular cavity and hand injected ventriculography was performed.  Pullback gradient was then assessed.  Upon completion of all procedures, a femoral angiogram and was performed and revealed arteriotomy site suitable for a closure device. A 6 Portuguese Mynx was used to  "achieve hemostasis. The patient tolerated the procedures well. There were no immediate complications.  During the procedure, I supervised the administration and monitoring of conscious sedation.  This included monitoring of the patient's status, oximetry, hemodynamics.  The patient received the first dose of IV sedation at 4:54 PM and I left the room at 5:22 PM, accounting for a total of 28 minutes of face-to-face time with the patient in the cath lab today.     Results:     Selective Coronary Angiography:     Left Main Coronary Artery: The left main coronary artery arises from the left coronary cusp and bifurcates into the LAD and left circumflex arteries.  The left main coronary artery is a short vessel with no significant disease.     Left Anterior Descending Artery: The left anterior descending artery arises from the distal left main coronary artery and supplies one diagonal branch before being acutely occluded in the midportion the vessel.     Left Circumflex: The left circumflex artery arises from the distal left main artery and is dominant for the posterior circulation, supplying a large caliber first obtuse marginal branch with multiple terminal branches and a PL system along with PDA.  Left circumflex system has no more than mild luminal irregularities.     Right Coronary Artery: The right coronary artery arises from the right coronary cusp and is a small caliber nondominant vessel with no significant disease.     Percutaneous Coronary Intervention to the mid left anterior descending artery:      Guide Catheter: 6 Tamazight XB 3.5  Anticoagulation: Angiomax bolus and drip  Wire(s): 0.014\" Prowater  Balloon: 2.0 x 12  Stent (s): 3.5 x 15 mm Xience     After the initial diagnostic images were obtained, the LAD was found to be acutely occluded.  An XB 3.5 guide catheter was advanced to the left coronary ostium.  A 0.014 inch pro-water wire was advanced across the occlusion and the distal end of the wire resided " in the left anterior descending artery.  Even with the wire crossing the lesion, flow was restored in the left anterior descending artery.  A 2.0 mm balloon was used to dilate a severe stenosis that was the culprit lesion.  Flow was then restored in the LAD, revealing a large diagonal branch near the previously occluded segment.  The level of occlusion was in the mid left anterior descending artery.  The lesion in the mid left anterior descending artery was then stented with a 3.5 x 15 mm Xience drug-eluting stent.  Following stent deployment, the stent appears to have no residual stenosis and there is DARWIN-3 flow down the LAD all the way into the recurrent apical branch.  The flow down the diagonal branch remains preserved with no obstruction of flow into the diagonal branch and DARWIN-3 flow.  No evidence of dissection, perforation or distal embolization.  The angiographic result is considered excellent.     Left Heart Catheterization:  -Left ventricular pressure: 123/5 mmHg  -Aortic pressure: 125/75 mmHg     Left Ventriculography: Preserved left ventricular ejection fraction with no regional wall motion abnormalities appreciated and no significant mitral valve regurgitation noted.     Total contrast: 200 mL     Fluoroscopy time: 6.7 min     X-ray exposure: 886 mGy     Estimated Blood Loss: Minimal     Specimens: None     Complications: None     Impression:  1.  Acute anterior STEMI secondary to LAD occlusion.  2.  Successful PCI to the mid left anterior descending artery with 3.5 x 15 mm Xience drug-eluting stent.  3.  Preserved left ventricular ejection fraction.  4.  Normal left ventricular end-diastolic pressure.     Plan:  1. Routine post procedure orders, including 4 hours bedrest, gentle IV fluids, admission to the CCU.  2.  Initiate dual antiplatelet therapy with aspirin and Brilinta.  3.  Complete 1 hour Angiomax infusion then discontinue.  4.  Continue beta-blocker and high intensity statin therapies.  5.   Check hemoglobin A1c, lipid profile.            Radiation      Event Details User   5:21 PM Radiation Tracking Panel 1: Guille Nixon MD   Cumulative Air Kerma (mGy) = 886.000; Fluoro Time (min) = 6.7; DAP (mGy-cm2) = 649.640 RW   Stent Inflated      Event Details User   5:12 PM Stent Inflated Stnt Xience Mone Everolimus Alex 3.5x15mm - First balloon inflation max pressure = 13 fabian. First balloon inflation duration = 16 seconds.  RW   Balloon Inflated      Event Details User   5:08 PM Balloon Inflated Baln Trek Mini Rx 2x12mm - First balloon inflation max pressure = 8 fabian. First balloon inflation duration = 8 seconds.  RW        09/03/20 1653      Date/Time  Temp  Pulse  Resp  BP  Device (Oxygen Therapy)  Flow (L/min)  SpO2   09/04/20 0953  98.3 (36.8)  83  14  136/80  room air  --  97   09/04/20 0930  --  --  --  --  room air  --  --   09/04/20 0900  --  85  --  127/104Abnormal   --  --  97   09/04/20 0830  --  84  --  129/86  --  --  97   09/04/20 0800  --  85  --  131/79  --  --  96   09/04/20 0730  --  74  --  130/77  --  --  97   09/04/20                                 Current Facility-Administered Medications   Medication Dose Route Frequency Provider Last Rate Last Dose   • acetaminophen (TYLENOL) tablet 650 mg  650 mg Oral Q4H PRN Guille Nixon MD       • aspirin chewable tablet 324 mg  324 mg Oral Once Guille Nixon MD        And   • aspirin EC tablet 81 mg  81 mg Oral Daily Guille Nixon MD   81 mg at 09/04/20 0801   • dextrose (D50W) 25 g/ 50mL Intravenous Solution 25 g  25 g Intravenous Q15 Min PRN Guille Nixon MD       • dextrose (GLUTOSE) oral gel 15 g  15 g Oral Q15 Min PRN Guille Nixon MD       • enoxaparin (LOVENOX) syringe 40 mg  40 mg Subcutaneous Q24H Guille Nixon MD   40 mg at 09/04/20 0519   • FLUoxetine (PROzac) capsule 20 mg  20 mg Oral Daily Guille Nixon MD   20 mg at 09/04/20 0801   • glucagon (human  recombinant) (GLUCAGEN DIAGNOSTIC) injection 1 mg  1 mg Subcutaneous Q15 Min PRN Guille Nixon MD       • hydroCHLOROthiazide (HYDRODIURIL) tablet 12.5 mg  12.5 mg Oral Daily Guille Nixon MD   12.5 mg at 09/04/20 0800   • insulin lispro (humaLOG) injection 2-7 Units  2-7 Units Subcutaneous TID AC Guille Nixon MD       • losartan (COZAAR) tablet 100 mg  100 mg Oral Q24H Guille Nixon MD   100 mg at 09/04/20 0800   • nebivolol (BYSTOLIC) tablet 10 mg  10 mg Oral QAM Guille Nixon MD   10 mg at 09/04/20 0801   • nitroglycerin (NITROSTAT) SL tablet 0.4 mg  0.4 mg Sublingual Q5 Min PRN Guille Nixon MD       • ondansetron (ZOFRAN) tablet 4 mg  4 mg Oral Q6H PRN Guille Nixon MD        Or   • ondansetron (ZOFRAN) injection 4 mg  4 mg Intravenous Q6H PRN Guille Nixon MD       • pantoprazole (PROTONIX) EC tablet 40 mg  40 mg Oral Q AM Guille Nixon MD   40 mg at 09/04/20 0519   • rosuvastatin (CRESTOR) tablet 40 mg  40 mg Oral Nightly Guille Nxion MD   40 mg at 09/03/20 2017   • sodium chloride 0.9 % flush 10 mL  10 mL Intravenous PRN Guille Nixon MD       • sodium chloride 0.9 % flush 10 mL  10 mL Intravenous Q12H Guille Nixon MD   10 mL at 09/04/20 0801   • ticagrelor (BRILINTA) tablet 90 mg  90 mg Oral Q12H Guille Nixon MD   90 mg at 09/04/20 0519

## 2020-09-04 NOTE — PLAN OF CARE
VSS. No complaints of chest pain. Patient followed bed rest protocol. Patient A&Ox4. Patient able to get to bedside commode once bed rest was lifted. Continue to monitor.

## 2020-09-05 ENCOUNTER — READMISSION MANAGEMENT (OUTPATIENT)
Dept: CALL CENTER | Facility: HOSPITAL | Age: 40
End: 2020-09-05

## 2020-09-05 NOTE — OUTREACH NOTE
Prep Survey      Responses   [facility] facility patient discharged from?  [facility]   Is LACE score < 7 ?  No   Eligibility  Readm Mgmt   Discharge diagnosis  Acute anterior ST segment elevation    Does the patient have one of the following disease processes/diagnoses(primary or secondary)?  Acute MI (STEMI,NSTEMI)   Does the patient have Home health ordered?  No   Is there a DME ordered?  No   Medication alerts for this patient  ASA    General alerts for this patient  Heart Cath    Prep survey completed?  Yes          Elaine White RN

## 2020-09-08 ENCOUNTER — TELEPHONE (OUTPATIENT)
Dept: INTERNAL MEDICINE | Age: 40
End: 2020-09-08

## 2020-09-08 NOTE — TELEPHONE ENCOUNTER
[facility] Transitions Initial Follow Up Call    Outreach made within 2 business days of discharge: Yes    Patient: Lyle Thomson Patient : 1980 MRN: [id number]    Date of Admission: 20  Date of Discharge: 20    Admission Diagnosis:  1. Acute anterior ST segment elevation myocardial infarction    Discharge Diagnosis:  1. Same as above      Spoke with: attempted to reach patient, no answer. Message left with intent of my call. I will reach out again later today.      Discharge department/facility: [facility]     Scheduled appointment with PCP within 7-14 days    Follow Up  Future Appointments   Date Time Provider Eliana Moser   9/10/2020  1:00 PM María Colvin MD [address]

## 2020-09-08 NOTE — TELEPHONE ENCOUNTER
[facility] Transitions Initial Follow Up Call    Outreach made within 2 business days of discharge: Yes    Patient: Panfilo Garcia          Patient : 1980 MRN: [id number]         Date of Admission: 20  Date of Discharge: 20    Admission Diagnosis:  1. Acute anterior ST segment elevation myocardial infarction    Discharge Diagnosis:  1. Same as above                 Spoke with: patient      Discharge department/facility: [facility] Interactive Patient Contact:  Was patient able to fill all prescriptions: Yes  Was patient instructed to bring all medications to the follow-up visit: Yes  Is patient taking all medications as directed in the discharge summary? Yes  Does patient understand their discharge instructions: Yes  Does patient have questions or concerns that need addressed prior to 7-14 day follow up office visit: no    I spoke with patient, she is home and doing ok. She states her groin is still a little sore and bruised from the heart cath. She reports they had to place a stent. She is feeling better now. Her appetite is good. Bowels and bladder are moving ok. She denies any needs at this time and will follow up on Thursday as scheduled.      Scheduled appointment with PCP within 7-14 days    Follow Up  Future Appointments   Date Time Provider Eliana Moser   9/10/2020  1:00 PM Genoveva Davila MD [address]

## 2020-09-09 ENCOUNTER — READMISSION MANAGEMENT (OUTPATIENT)
Dept: CALL CENTER | Facility: HOSPITAL | Age: 40
End: 2020-09-09

## 2020-09-09 NOTE — OUTREACH NOTE
AMI Week 1 Survey      Responses   [facility] patient discharged from?  [place]   Does the patient have one of the following disease processes/diagnoses(primary or secondary)?  Acute MI (STEMI,NSTEMI)   Is there a successful TCM telephone encounter documented?  No   Week 1 attempt successful?  No   Unsuccessful attempts  Attempt 1          Lorrie Da Silva RN

## 2020-09-10 ENCOUNTER — OFFICE VISIT (OUTPATIENT)
Dept: INTERNAL MEDICINE | Age: 40
End: 2020-09-10
Payer: COMMERCIAL

## 2020-09-10 VITALS
WEIGHT: 240 LBS | OXYGEN SATURATION: 98 % | BODY MASS INDEX: 42.51 KG/M2 | HEART RATE: 74 BPM | SYSTOLIC BLOOD PRESSURE: 118 MMHG | DIASTOLIC BLOOD PRESSURE: 78 MMHG | RESPIRATION RATE: 18 BRPM

## 2020-09-10 PROCEDURE — 99496 TRANSJ CARE MGMT HIGH F2F 7D: CPT | Performed by: INTERNAL MEDICINE

## 2020-09-10 RX ORDER — PANTOPRAZOLE SODIUM 40 MG/1
40 GRANULE, DELAYED RELEASE ORAL
COMMUNITY
End: 2021-10-20 | Stop reason: CLARIF

## 2020-09-10 RX ORDER — ASPIRIN 81 MG/1
81 TABLET ORAL DAILY
COMMUNITY

## 2020-09-10 RX ORDER — UBIDECARENONE 100 MG
100 CAPSULE ORAL DAILY
COMMUNITY

## 2020-09-10 RX ORDER — FLUOXETINE HYDROCHLORIDE 20 MG/1
20 CAPSULE ORAL DAILY
COMMUNITY
End: 2020-12-29

## 2020-09-10 RX ORDER — HYDROCHLOROTHIAZIDE 12.5 MG/1
25 TABLET ORAL DAILY
COMMUNITY
End: 2020-09-10

## 2020-09-10 RX ORDER — LOSARTAN POTASSIUM 100 MG/1
100 TABLET ORAL DAILY
COMMUNITY
End: 2020-09-10

## 2020-09-10 RX ORDER — NORETHINDRONE ACETATE AND ETHINYL ESTRADIOL 1MG-20(21)
1 KIT ORAL DAILY
COMMUNITY
End: 2020-10-22 | Stop reason: CLARIF

## 2020-09-10 RX ORDER — NITROGLYCERIN 0.4 MG/1
0.4 TABLET SUBLINGUAL EVERY 5 MIN PRN
COMMUNITY

## 2020-09-10 ASSESSMENT — ENCOUNTER SYMPTOMS
ABDOMINAL PAIN: 0
SORE THROAT: 0
COUGH: 0
CHEST TIGHTNESS: 0
WHEEZING: 0
CONSTIPATION: 0

## 2020-09-10 ASSESSMENT — PATIENT HEALTH QUESTIONNAIRE - PHQ9
SUM OF ALL RESPONSES TO PHQ QUESTIONS 1-9: 0
2. FEELING DOWN, DEPRESSED OR HOPELESS: 0
1. LITTLE INTEREST OR PLEASURE IN DOING THINGS: 0
SUM OF ALL RESPONSES TO PHQ QUESTIONS 1-9: 0
SUM OF ALL RESPONSES TO PHQ9 QUESTIONS 1 & 2: 0

## 2020-09-10 NOTE — PROGRESS NOTES
Hospital Follow-up Visit      Lyle Thomson   YOB: 1980    Date of Visit:  9/10/2020    Vitals:    09/10/20 1307   BP: 118/78   Site: Left Upper Arm   Position: Sitting   Cuff Size: Large Adult   Pulse: 74   Resp: 18   SpO2: 98%   Weight: 240 lb (108.9 kg)     Body mass index is 42.51 kg/m². Wt Readings from Last 3 Encounters:   09/10/20 240 lb (108.9 kg)   08/19/20 243 lb (110.2 kg)   05/19/20 244 lb (110.7 kg)     BP Readings from Last 3 Encounters:   09/10/20 118/78   08/19/20 120/86   05/19/20 (!) 143/88       Allergies   Allergen Reactions    Penicillins      unknown     Outpatient Medications Marked as Taking for the 9/10/20 encounter (Office Visit) with María Colvin MD   Medication Sig Dispense Refill    aspirin 81 MG EC tablet Take 81 mg by mouth daily      norethindrone-ethinyl estradiol (JUNEL FE 1/20) 1-20 MG-MCG per tablet Take 1 tablet by mouth daily      vitamin D 25 MCG (1000 UT) CAPS Take by mouth      coenzyme Q10 100 MG CAPS capsule Take 100 mg by mouth daily      FLUoxetine (PROZAC) 20 MG capsule Take 20 mg by mouth daily      nitroGLYCERIN (NITROSTAT) 0.4 MG SL tablet Place 0.4 mg under the tongue every 5 minutes as needed for Chest pain up to max of 3 total doses. If no relief after 1 dose, call 911.       pantoprazole sodium (PROTONIX) 40 MG PACK packet Take 40 mg by mouth every morning (before breakfast)      ticagrelor (BRILINTA) 90 MG TABS tablet Take 90 mg by mouth 2 times daily      [DISCONTINUED] FLUoxetine (PROZAC) 20 MG tablet Take 1 tablet by mouth daily 30 tablet 3    TOUJEO SOLOSTAR 300 UNIT/ML SOPN Inject 50 Units into the skin nightly 6 mL 1    nebivolol (BYSTOLIC) 10 MG tablet Take 1 tablet by mouth every morning 30 tablet 5    olmesartan-hydrochlorothiazide (BENICAR HCT) 40-12.5 MG per tablet Take 1 tablet by mouth daily 90 tablet 0    rosuvastatin (CRESTOR) 40 MG tablet Take 1 tablet by mouth daily 90 tablet 0  Insulin Pen Needle 32G X 4 MM MISC 1 each by Does not apply route daily 100 each 3         CC:  Patient presents for hospital discharge follow-upafter admission   (following highlighted text has been copied from this specialist note)    Discharge Summaries  - documented in this encounter  Conchita Michel MD - 09/04/2020 2:21 PM CDT  Formatting of this note might be different from the original.  Date of Admission: 09/03/20  Date of Discharge: 09/04/20    Admission Diagnosis:  1. Acute anterior ST segment elevation myocardial infarction    Discharge Diagnosis:  1. Same as above    Consults: None    Procedures:  1. Cardiac catheterization and PCI to the LAD on the day of admission with Xience drug-eluting stent  2. Echocardiogram on the day of discharge showing left ventricular ejection fraction of 50-55%    HPI: This patient presented with acute onset chest pain and was found to have an acute anterior ST segment elevation myocardial infarction and was urgently taken to the cardiac catheterization lab. Hospital Course: The patient was brought emergently to the cardiac catheterization lab and underwent coronary angiography as well as PCI to the LAD with Xience drug-eluting stent. She was monitored overnight. Her cardiac rhythm remained stable. She was examined on the day of discharge and transferred to the telemetry floor. She has been ambulatory throughout the day and has done well. She had an echocardiogram showing low normal systolic function. The patient is stable at this time for discharge home.   Discharge Medications     New Medications   Instructions Start Date   aspirin 81 MG EC tablet  81 mg, Oral, Daily  Start Date: September 5, 2020    losartan 100 MG tablet  Commonly known as: COZAAR  Replaces: olmesartan 40 MG tablet  100 mg, Oral, Every 24 Hours Scheduled  Start Date: September 5, 2020    nitroglycerin 0.4 MG SL tablet  Commonly known as: NITROSTAT  0.4 mg, Sublingual, Every 5 Minutes PRN, Take no more than 3 doses in 15 minutes. ticagrelor 90 MG tablet tablet  Commonly known as: BRILINTA  90 mg, Oral, Every 12 Hours Scheduled      Changes to Medications   Instructions Start Date   Bystolic 10 MG tablet  Generic drug: nebivolol  What changed: Another medication with the same name was added. Make sure you understand how and when to take each. 10 mg, Oral, Every Morning    nebivolol 10 MG tablet  Commonly known as: BYSTOLIC  What changed: You were already taking a medication with the same name, and this prescription was added. Make sure you understand how and when to take each. 10 mg, Oral, Every Morning  Start Date: September 5, 2020    FLUoxetine 20 MG capsule  Commonly known as: PROzac  What changed: Another medication with the same name was added. Make sure you understand how and when to take each. 20 mg, Oral, Daily    FLUoxetine 20 MG capsule  Commonly known as: PROzac  What changed: You were already taking a medication with the same name, and this prescription was added. Make sure you understand how and when to take each. 20 mg, Oral, Daily  Start Date: September 5, 2020    hydroCHLOROthiazide 12.5 MG tablet  Commonly known as: HYDRODIURIL  What changed:   · how much to take  · how to take this  · when to take this  12.5 mg, Oral, Daily  Start Date: September 5, 2020    pantoprazole 40 MG EC tablet  Commonly known as: PROTONIX  What changed: Another medication with the same name was added. Make sure you understand how and when to take each. 40 mg, Oral, Daily    pantoprazole 40 MG EC tablet  Commonly known as: PROTONIX  What changed: You were already taking a medication with the same name, and this prescription was added. Make sure you understand how and when to take each. 40 mg, Oral, Daily    rosuvastatin 40 MG tablet  Commonly known as: CRESTOR  What changed: Another medication with the same name was added. Make sure you understand how and when to take each.   40 mg, Oral, Daily    rosuvastatin 40 MG tablet  Commonly known as: CRESTOR  What changed: You were already taking a medication with the same name, and this prescription was added. Make sure you understand how and when to take each. 40 mg, Oral, Nightly    Toujeo SoloStar 300 UNIT/ML solution pen-injector injection  Generic drug: Insulin Glargine (1 Unit Dial)  What changed:   · how much to take  · when to take this  Subcutaneous      Continue These Medications   Instructions Start Date   aprepitant 40 MG capsule  Commonly known as: EMEND  40 mg, Oral, Daily, Please take the day of surgery as directed    BD Pen Needle Marisa U/F 32G X 4 MM misc  Generic drug: Insulin Pen Needle  Use one daily    Blisovi FE 1/20 1-20 MG-MCG per tablet  Generic drug: norethindrone-ethinyl estradiol FE  1 tablet, Oral, Daily    cholecalciferol 25 MCG (1000 UT) tablet  Commonly known as: VITAMIN D3  1,000 Units, Oral, Daily    coenzyme Q10 100 MG capsule  100 mg, Oral, Daily    multivitamin with minerals tablet tablet  1 tablet, Oral, Daily    olmesartan-hydrochlorothiazide 40-12.5 MG per tablet  Commonly known as: BENICAR HCT  1 tablet, Oral, Daily    Vitamin B 12 500 MCG tablet  1 tablet, Oral, Daily      Stop These Medications   olmesartan 40 MG tablet  Commonly known as: BENICAR  Replaced by: losartan 100 MG tablet    Medications are reconciled and reviewed. Inpatient course: Discharge summary reviewed- see chart. Current status: stable    Review of Systems   Constitutional: Positive for fatigue. Negative for chills and fever. HENT: Negative for congestion, ear pain, nosebleeds, postnasal drip and sore throat. Respiratory: Negative for cough, chest tightness and wheezing. Cardiovascular: Negative for chest pain, palpitations and leg swelling. Gastrointestinal: Negative for abdominal pain and constipation. Genitourinary: Negative for dysuria and urgency. Musculoskeletal: Negative. Negative for arthralgias.    Skin: Negative for rash.   Neurological: Negative for dizziness and headaches. Psychiatric/Behavioral: Negative. Physical Exam  Constitutional:       Appearance: She is well-developed. HENT:      Right Ear: External ear normal.      Left Ear: External ear normal.      Mouth/Throat:      Pharynx: No oropharyngeal exudate. Eyes:      Conjunctiva/sclera: Conjunctivae normal.      Pupils: Pupils are equal, round, and reactive to light. Neck:      Musculoskeletal: Neck supple. Thyroid: No thyromegaly. Vascular: No JVD. Cardiovascular:      Rate and Rhythm: Normal rate. Heart sounds: Normal heart sounds. No murmur. Pulmonary:      Effort: No respiratory distress. Breath sounds: Normal breath sounds. No wheezing or rales. Chest:      Chest wall: No tenderness. Abdominal:      General: Bowel sounds are normal.      Palpations: Abdomen is soft. Musculoskeletal: Normal range of motion. Lymphadenopathy:      Cervical: No cervical adenopathy. Skin:     General: Skin is warm. Findings: No rash. Neurological:      Mental Status: She is oriented to person, place, and time.        Lab Results   Component Value Date     08/13/2020    K 4.4 08/13/2020     08/13/2020    CO2 27.0 08/13/2020    BUN 13 08/13/2020    CREATININE 0.64 08/13/2020    GLUCOSE 124 08/13/2020    CALCIUM 9.4 08/13/2020     Lab Results   Component Value Date    WBC 8.28 02/11/2020    HGB 13.2 02/11/2020    HCT 38.0 02/11/2020    MCV 85.6 02/11/2020     02/11/2020     Lab Results   Component Value Date    CHOL 167 08/13/2020    TRIG 200 08/13/2020    HDL 51 08/13/2020       Assessment/Plan:    History of acute anterior wall MI 9/3/20  Status post insertion of drug-eluting stent into left anterior descending (LAD) artery    Type 2 diabetes mellitus without complication, without long-term current use of insulin (HCC)  A1c is 7.7 -9/2020 (7.9- 8/2020 (7.4 in 5/2020)  Previous 2/2020:9.15

## 2020-09-14 ENCOUNTER — READMISSION MANAGEMENT (OUTPATIENT)
Dept: CALL CENTER | Facility: HOSPITAL | Age: 40
End: 2020-09-14

## 2020-09-14 NOTE — OUTREACH NOTE
AMI Week 1 Survey      Responses   [facility] patient discharged from?  [place]   Does the patient have one of the following disease processes/diagnoses(primary or secondary)?  Acute MI (STEMI,NSTEMI)   Is there a successful TCM telephone encounter documented?  No   Week 1 attempt successful?  Yes   Call start time  2357   Call end time  1159   General alerts for this patient  Heart Cath    Discharge diagnosis  Acute anterior ST segment elevation    Meds reviewed with patient/caregiver?  Yes   Is the patient having any side effects they believe may be caused by any medication additions or changes?  No   Does the patient have all prescriptions related to this admission filled (includes statins,anticoagulants,HTN meds,anti-arrhythmia meds)  Yes   Is the patient taking all medications as directed (includes completed medication regime)?  Yes   Does the patient have a primary care provider?   Yes   Does the patient have an appointment with their PCP,cardiologist,or clinic within 7 days of discharge?  Yes   Has the patient kept scheduled appointments due by today?  Yes   Has home health visited the patient within 72 hours of discharge?  N/A   Psychosocial issues?  No   Did the patient receive a copy of their discharge instructions?  Yes   Nursing interventions  Reviewed instructions with patient   What is the patient's perception of their health status since discharge?  Improving   Nursing interventions  Nurse provided patient education   Is the patient/caregiver able to teach back signs and symptoms of when to call for help immediately:  Sudden chest discomfort, Sudden discomfort in arms, back, neck or jaw   Nursing interventions  Nurse provided patient education   Is the pateint /caregiver able to teach back the importance of cardiac rehab?  Yes   Nursing interventions  Provided education on importance of cardiac rehab   Is the patient/caregiver able to teach back lifestyle changes to help prevent MIs  Regular  exercise as approved by provider, Heart healthy diet   Is the patient/caregiver able to teach back ways to prevent a second heart attack:  Take medications, Follow up with MD, Participate in Cardiac Rehab   Is the patient/caregiver able to teach back the hierarchy of who to call/visit for symptoms/problems? PCP, Specialist, Home health nurse, Urgent Care, ED, 911  Yes   Week 1 call completed?  Yes          Evans Radford RN

## 2020-09-22 RX ORDER — OLMESARTAN MEDOXOMIL AND HYDROCHLOROTHIAZIDE 40/12.5 40; 12.5 MG/1; MG/1
1 TABLET ORAL DAILY
Qty: 90 TABLET | Refills: 1 | Status: SHIPPED | OUTPATIENT
Start: 2020-09-22 | End: 2020-12-14 | Stop reason: SDUPTHER

## 2020-09-23 ENCOUNTER — READMISSION MANAGEMENT (OUTPATIENT)
Dept: CALL CENTER | Facility: HOSPITAL | Age: 40
End: 2020-09-23

## 2020-09-23 NOTE — OUTREACH NOTE
AMI Week 2 Survey      Responses   [facility] patient discharged from?  [place]   Does the patient have one of the following disease processes/diagnoses(primary or secondary)?  Acute MI (STEMI,NSTEMI)   Week 2 attempt successful?  Yes   Call start time  0935   Call end time  0939   Discharge diagnosis  Acute anterior ST segment elevation    Meds reviewed with patient/caregiver?  Yes   Is the patient taking all medications as directed (includes completed medication regime)?  Yes   Has the patient kept scheduled appointments due by today?  Yes   What is the patient's perception of their health status since discharge?  Improving   Nursing interventions  Provided education on importance of cardiac rehab   Week 2 call completed?  Yes          Theresa Tripathi RN

## 2020-09-25 ENCOUNTER — OFFICE VISIT (OUTPATIENT)
Dept: OBSTETRICS AND GYNECOLOGY | Facility: CLINIC | Age: 40
End: 2020-09-25

## 2020-09-25 VITALS
SYSTOLIC BLOOD PRESSURE: 110 MMHG | DIASTOLIC BLOOD PRESSURE: 64 MMHG | BODY MASS INDEX: 42.88 KG/M2 | WEIGHT: 242 LBS | HEIGHT: 63 IN

## 2020-09-25 DIAGNOSIS — Z87.891 FORMER SMOKER: ICD-10-CM

## 2020-09-25 DIAGNOSIS — Z30.40 ENCOUNTER FOR SURVEILLANCE OF CONTRACEPTIVES, UNSPECIFIED CONTRACEPTIVE: Primary | ICD-10-CM

## 2020-09-25 PROCEDURE — 99213 OFFICE O/P EST LOW 20 MIN: CPT | Performed by: NURSE PRACTITIONER

## 2020-09-25 RX ORDER — NORETHINDRONE ACETATE AND ETHINYL ESTRADIOL 1MG-20(21)
1 KIT ORAL DAILY
Qty: 84 TABLET | Refills: 0 | Status: CANCELLED | OUTPATIENT
Start: 2020-09-25

## 2020-10-01 ENCOUNTER — TREATMENT (OUTPATIENT)
Dept: CARDIAC REHAB | Facility: HOSPITAL | Age: 40
End: 2020-10-01

## 2020-10-01 DIAGNOSIS — I21.02 ST ELEVATION MYOCARDIAL INFARCTION INVOLVING LEFT ANTERIOR DESCENDING (LAD) CORONARY ARTERY (HCC): Primary | ICD-10-CM

## 2020-10-01 PROCEDURE — 93798 PHYS/QHP OP CAR RHAB W/ECG: CPT

## 2020-10-06 RX ORDER — INSULIN GLARGINE 300 U/ML
50 INJECTION, SOLUTION SUBCUTANEOUS NIGHTLY
Qty: 4.5 ML | Refills: 1 | Status: SHIPPED | OUTPATIENT
Start: 2020-10-06 | End: 2020-12-05 | Stop reason: SDUPTHER

## 2020-10-12 ENCOUNTER — OFFICE VISIT (OUTPATIENT)
Dept: CARDIOLOGY | Facility: CLINIC | Age: 40
End: 2020-10-12

## 2020-10-12 VITALS
HEART RATE: 90 BPM | HEIGHT: 63 IN | DIASTOLIC BLOOD PRESSURE: 78 MMHG | OXYGEN SATURATION: 97 % | SYSTOLIC BLOOD PRESSURE: 120 MMHG | BODY MASS INDEX: 43.05 KG/M2 | WEIGHT: 243 LBS

## 2020-10-12 DIAGNOSIS — I10 ESSENTIAL HYPERTENSION: ICD-10-CM

## 2020-10-12 DIAGNOSIS — E66.01 CLASS 3 SEVERE OBESITY DUE TO EXCESS CALORIES WITH SERIOUS COMORBIDITY AND BODY MASS INDEX (BMI) OF 40.0 TO 44.9 IN ADULT (HCC): ICD-10-CM

## 2020-10-12 DIAGNOSIS — E11.9 TYPE 2 DIABETES MELLITUS WITHOUT COMPLICATION, WITHOUT LONG-TERM CURRENT USE OF INSULIN (HCC): ICD-10-CM

## 2020-10-12 DIAGNOSIS — E78.2 MIXED HYPERLIPIDEMIA: ICD-10-CM

## 2020-10-12 DIAGNOSIS — I25.10 CORONARY ARTERY DISEASE INVOLVING NATIVE CORONARY ARTERY OF NATIVE HEART WITHOUT ANGINA PECTORIS: ICD-10-CM

## 2020-10-12 DIAGNOSIS — I21.02 ST ELEVATION MYOCARDIAL INFARCTION INVOLVING LEFT ANTERIOR DESCENDING (LAD) CORONARY ARTERY (HCC): Primary | ICD-10-CM

## 2020-10-12 PROBLEM — I21.3 ST ELEVATION MYOCARDIAL INFARCTION (STEMI): Status: RESOLVED | Noted: 2020-09-03 | Resolved: 2020-10-12

## 2020-10-12 PROCEDURE — 93000 ELECTROCARDIOGRAM COMPLETE: CPT | Performed by: NURSE PRACTITIONER

## 2020-10-12 PROCEDURE — 99214 OFFICE O/P EST MOD 30 MIN: CPT | Performed by: NURSE PRACTITIONER

## 2020-10-12 NOTE — PATIENT INSTRUCTIONS
Hypertension, Adult  Hypertension is another name for high blood pressure. High blood pressure forces your heart to work harder to pump blood. This can cause problems over time.  There are two numbers in a blood pressure reading. There is a top number (systolic) over a bottom number (diastolic). It is best to have a blood pressure that is below 120/80. Healthy choices can help lower your blood pressure, or you may need medicine to help lower it.  What are the causes?  The cause of this condition is not known. Some conditions may be related to high blood pressure.  What increases the risk?  · Smoking.  · Having type 2 diabetes mellitus, high cholesterol, or both.  · Not getting enough exercise or physical activity.  · Being overweight.  · Having too much fat, sugar, calories, or salt (sodium) in your diet.  · Drinking too much alcohol.  · Having long-term (chronic) kidney disease.  · Having a family history of high blood pressure.  · Age. Risk increases with age.  · Race. You may be at higher risk if you are .  · Gender. Men are at higher risk than women before age 45. After age 65, women are at higher risk than men.  · Having obstructive sleep apnea.  · Stress.  What are the signs or symptoms?  · High blood pressure may not cause symptoms. Very high blood pressure (hypertensive crisis) may cause:  ? Headache.  ? Feelings of worry or nervousness (anxiety).  ? Shortness of breath.  ? Nosebleed.  ? A feeling of being sick to your stomach (nausea).  ? Throwing up (vomiting).  ? Changes in how you see.  ? Very bad chest pain.  ? Seizures.  How is this treated?  · This condition is treated by making healthy lifestyle changes, such as:  ? Eating healthy foods.  ? Exercising more.  ? Drinking less alcohol.  · Your health care provider may prescribe medicine if lifestyle changes are not enough to get your blood pressure under control, and if:  ? Your top number is above 130.  ? Your bottom number is above  80.  · Your personal target blood pressure may vary.  Follow these instructions at home:  Eating and drinking    · If told, follow the DASH eating plan. To follow this plan:  ? Fill one half of your plate at each meal with fruits and vegetables.  ? Fill one fourth of your plate at each meal with whole grains. Whole grains include whole-wheat pasta, brown rice, and whole-grain bread.  ? Eat or drink low-fat dairy products, such as skim milk or low-fat yogurt.  ? Fill one fourth of your plate at each meal with low-fat (lean) proteins. Low-fat proteins include fish, chicken without skin, eggs, beans, and tofu.  ? Avoid fatty meat, cured and processed meat, or chicken with skin.  ? Avoid pre-made or processed food.  · Eat less than 1,500 mg of salt each day.  · Do not drink alcohol if:  ? Your doctor tells you not to drink.  ? You are pregnant, may be pregnant, or are planning to become pregnant.  · If you drink alcohol:  ? Limit how much you use to:  § 0-1 drink a day for women.  § 0-2 drinks a day for men.  ? Be aware of how much alcohol is in your drink. In the [place] one drink equals one 12 oz bottle of beer (355 mL), one 5 oz glass of wine (148 mL), or one 1½ oz glass of hard liquor (44 mL).  Lifestyle    · Work with your doctor to stay at a healthy weight or to lose weight. Ask your doctor what the best weight is for you.  · Get at least 30 minutes of exercise most days of the week. This may include walking, swimming, or biking.  · Get at least 30 minutes of exercise that strengthens your muscles (resistance exercise) at least 3 days a week. This may include lifting weights or doing Pilates.  · Do not use any products that contain nicotine or tobacco, such as cigarettes, e-cigarettes, and chewing tobacco. If you need help quitting, ask your doctor.  · Check your blood pressure at home as told by your doctor.  · Keep all follow-up visits as told by your doctor. This is important.  Medicines  · Take over-the-counter  and prescription medicines only as told by your doctor. Follow directions carefully.  · Do not skip doses of blood pressure medicine. The medicine does not work as well if you skip doses. Skipping doses also puts you at risk for problems.  · Ask your doctor about side effects or reactions to medicines that you should watch for.  Contact a doctor if you:  · Think you are having a reaction to the medicine you are taking.  · Have headaches that keep coming back (recurring).  · Feel dizzy.  · Have swelling in your ankles.  · Have trouble with your vision.  Get help right away if you:  · Get a very bad headache.  · Start to feel mixed up (confused).  · Feel weak or numb.  · Feel faint.  · Have very bad pain in your:  ? Chest.  ? Belly (abdomen).  · Throw up more than once.  · Have trouble breathing.  Summary  · Hypertension is another name for high blood pressure.  · High blood pressure forces your heart to work harder to pump blood.  · For most people, a normal blood pressure is less than 120/80.  · Making healthy choices can help lower blood pressure. If your blood pressure does not get lower with healthy choices, you may need to take medicine.  This information is not intended to replace advice given to you by your health care provider. Make sure you discuss any questions you have with your health care provider.  Document Released: 06/05/2009 Document Revised: 08/28/2019 Document Reviewed: 08/28/2019  [employer] Patient Education © 2020 [employer]  Coronary Artery Disease, Female  Coronary artery disease (CAD) is a condition in which the arteries that lead to the heart (coronary arteries) become narrow or blocked. The narrowing or blockage can lead to decreased blood flow to the heart. Prolonged reduced blood flow can cause a heart attack (myocardial infarction or MI). This condition may also be called coronary heart disease.  Because CAD is the leading cause of death in women, it is important to understand what  causes this condition and how it is treated.  What are the causes?  CAD is most often caused by atherosclerosis. This is the buildup of fat and cholesterol (plaque) on the inside of the arteries. Over time, the plaque may narrow or block the artery, reducing blood flow to the heart. Plaque can also become weak and break off within a coronary artery and cause a sudden blockage. Other less common causes of CAD include:  · A blood clot or a piece of a blood clot or other substance that blocks the flow of blood in a coronary artery (embolism).  · A tearing of the artery (spontaneous coronary artery dissection).  · An enlargement of an artery (aneurysm).  · Inflammation (vasculitis) in the artery wall.  What increases the risk?  The following factors may make you more likely to develop this condition:  · Age. Women over age 55 are at a greater risk of CAD.  · Family history of CAD.  · High blood pressure (hypertension).  · Diabetes.  · High cholesterol levels.  · Tobacco use.  · Lack of exercise.  · Menopause.  ? All postmenopausal women are at greater risk of CAD.  ? Women who have experienced menopause between the ages of 40-45 (early menopause) are at a higher risk of CAD.  ? Women who have experienced menopause before age 40 (premature menopause) are at a very high risk of CAD.  · Excessive alcohol use.  · A diet high in saturated and trans fats, such as fried food and processed meat.  Other possible risk factors include:  · High stress levels.  · Depression.  · Obesity.  · Sleep apnea.  What are the signs or symptoms?  Many people do not have any symptoms during the early stages of CAD. As the condition progresses, symptoms may include:  · Chest pain (angina). The pain can:  ? Feel like crushing or squeezing, or like a tightness, pressure, fullness, or heaviness in the chest.  ? Last more than a few minutes or can stop and recur. The pain tends to get worse with exercise or stress and to fade with rest.  · Pain in  the arms, neck, jaw, ear, or back.  · Unexplained heartburn or indigestion.  · Shortness of breath.  · Nausea.  · Sudden cold sweats.  · Sudden light-headedness.  · Fluttering or fast heartbeat (palpitations).  Many women have chest discomfort and the other symptoms. However, women often have unusual (atypical) symptoms, such as:  · Fatigue.  · Vomiting.  · Unexplained feelings of nervousness or anxiety.  · Unexplained weakness.  · Dizziness or fainting.  How is this diagnosed?  This condition is diagnosed based on:  · Your family and medical history.  · A physical exam.  · Tests, including:  ? A test to check the electrical signals in your heart (electrocardiogram).  ? Exercise stress test. This looks for signs of blockage when the heart is stressed with exercise, such as running on a treadmill.  ? Pharmacologic stress test. This test looks for signs of blockage when the heart is being stressed with a medicine.  ? Blood tests.  ? Coronary angiogram. This is a procedure to look at the coronary arteries to see if there is any blockage. During this test, a dye is injected into your arteries so they appear on an X-ray.  ? Coronary artery CT scan. This CT scan helps detect calcium deposits in your coronary arteries. Calcium deposits are an indicator of CAD.  ? A test that uses sound waves to take a picture of your heart (echocardiogram).  ? Chest X-ray.  How is this treated?  This condition may be treated by:  · Healthy lifestyle changes to reduce risk factors.  · Medicines such as:  ? Antiplatelet medicines and blood-thinning medicines, such as aspirin. These help to prevent blood clots.  ? Nitroglycerin.  ? Blood pressure medicines.  ? Cholesterol-lowering medicine.  · Coronary angioplasty and stenting. During this procedure, a thin, flexible tube is inserted through a blood vessel and into a blocked artery. A balloon or similar device on the end of the tube is inflated to open up the artery. In some cases, a small,  mesh tube (stent) is inserted into the artery to keep it open.  · Coronary artery bypass surgery. During this surgery, veins or arteries from other parts of the body are used to create a bypass around the blockage and allow blood to reach your heart.  Follow these instructions at home:  Medicines  · Take over-the-counter and prescription medicines only as told by your health care provider.  · Do not take the following medicines unless your health care provider approves:  ? NSAIDs, such as ibuprofen, naproxen, or celecoxib.  ? Vitamin supplements that contain vitamin A, vitamin E, or both.  ? Hormone replacement therapy that contains estrogen with or without progestin.  Lifestyle  · Follow an exercise program approved by your health care provider. Aim for 150 minutes of moderate exercise or 75 minutes of vigorous exercise each week.  · Maintain a healthy weight or lose weight as approved by your health care provider.  · Learn to manage stress or try to limit your stress. Ask your health care provider for suggestions if you need help.  · Get screened for depression and seek treatment, if needed.  · Do not use any products that contain nicotine or tobacco, such as cigarettes, e-cigarettes, and chewing tobacco. If you need help quitting, ask your health care provider.  · Do not use illegal drugs.  Eating and drinking    · Follow a heart-healthy diet. A dietitian can help educate you about healthy food options and changes. In general, eat plenty of fruits and vegetables, lean meats, and whole grains.  · Avoid foods high in:  ? Sugar.  ? Salt (sodium).  ? Saturated fats, such as processed or fatty meat.  ? Trans fats, such as fried food.  · Use healthy cooking methods such as roasting, grilling, broiling, baking, poaching, steaming, or stir-frying.  · Do not drink alcohol if:  ? Your health care provider tells you not to drink.  ? You are pregnant, may be pregnant, or are planning to become pregnant.  · If you drink  alcohol:  ? Limit how much you have to 0-1 drink a day.  ? Be aware of how much alcohol is in your drink. In the [place] one drink equals one 12 oz bottle of beer (355 mL), one 5 oz glass of wine (148 mL), or one 1½ oz glass of hard liquor (44 mL).  General instructions  · Manage any other health conditions, such as hypertension and diabetes. These conditions affect your heart.  · Your health care provider may ask you to monitor your blood pressure. Ideally, your blood pressure should be below 130/80.  · Keep all follow-up visits as told by your health care provider. This is important.  Get help right away if:  · You have pain in your chest, neck, ear, arm, jaw, stomach, or back that:  ? Lasts more than a few minutes.  ? Is recurring.  ? Is not relieved by taking medicine under your tongue (sublingual nitroglycerin).  · You have profuse sweating without cause.  · You have unexplained:  ? Heartburn or indigestion.  ? Shortness of breath or difficulty breathing.  ? Fluttering or fast heartbeat (palpitations).  ? Nausea or vomiting.  ? Fatigue.  ? Feelings of nervousness or anxiety.  ? Weakness.  ? Diarrhea.  · You have sudden light-headedness or dizziness.  · You faint.  · You feel like hurting yourself or think about taking your own life.  These symptoms may represent a serious problem that is an emergency. Do not wait to see if the symptoms will go away. Get medical help right away. Call your local emergency services (911 in the [place] Do not drive yourself to the hospital.  Summary  · Coronary artery disease (CAD) is a condition in which the arteries that lead to the heart (coronary arteries) become narrow or blocked. The narrowing or blockage can lead to a heart attack.  · Many women have chest discomfort and other common symptoms of CAD. However, women often have unusual (atypical) symptoms, such as fatigue, vomiting, weakness, or dizziness.  · CAD can be treated with lifestyle changes, medicines, surgery, or a  combination of these treatments.  This information is not intended to replace advice given to you by your health care provider. Make sure you discuss any questions you have with your health care provider.  Document Released: 03/11/2013 Document Revised: 09/06/2019 Document Reviewed: 08/27/2019  [employer] Patient Education © 2020 [employer]

## 2020-10-12 NOTE — PROGRESS NOTES
Subjective:     Encounter Date:10/12/2020      Patient ID: Harry Panda is a 39 y.o. female who presented to the hospital with chest pain and treated for STEMI. She has hx of DM, HTN, HLD, and obesity. She presents today for routine discharge follow up.     Chief Complaint: Discharge Follow up  Coronary Artery Disease  Presents for follow-up visit. Pertinent negatives include no chest pain, chest pressure, dizziness, leg swelling, palpitations or shortness of breath. Risk factors include hyperlipidemia and obesity. The symptoms have been stable. Compliance with diet is good. Compliance with exercise is good. Compliance with medications is good.   Hypertension  This is a chronic problem. The current episode started more than 1 year ago. The problem is controlled. Pertinent negatives include no chest pain, headaches, malaise/fatigue, orthopnea, palpitations, peripheral edema, PND or shortness of breath. Risk factors for coronary artery disease include diabetes mellitus, dyslipidemia and obesity. Current antihypertension treatment includes diuretics, beta blockers and angiotensin blockers. The current treatment provides significant improvement. There are no compliance problems.  There is no history of angina, CAD/MI, CVA or heart failure.   Hyperlipidemia  This is a chronic problem. The current episode started more than 1 year ago. The problem is controlled. Recent lipid tests were reviewed and are low. Exacerbating diseases include diabetes and obesity. Pertinent negatives include no chest pain, focal weakness or shortness of breath. Current antihyperlipidemic treatment includes statins. The current treatment provides significant improvement of lipids. Risk factors for coronary artery disease include diabetes mellitus, dyslipidemia, obesity and hypertension.     Harry presents today for discharge follow up. She was treated for anterior STEMI in early September when she presented to the hospital with complaints  "of chest pain. She has been well since her discharge. She denies any recurrent chest pain, pressure, or discomfort. She denies shortness of breath, fatigue, dizziness, lightheadedness. She is tolerating her medications. She is compliant with DAPT, aspirin and brilinta. She has seen her PCP who is monitoring her diabetes and HTN. She is back on her home dose of Benicar which had been substituted out during her stay due to hospital formulary options.  She reports that her blood pressure has been well controlled.  Her lipids are well controlled. She is on new trial of Ozempic. She does not tolerate Jardiance due to yeast infections. She noted some \"flutters\" a couple days after her discharge, however, they have all subsided.  She feels well. She attended a session of cardiac rehab and felt well. Due to copay she intends on pursing exercise and activity through her personal gym membership.    The following portions of the patient's history were reviewed and updated as appropriate: allergies, current medications, past family history, past medical history, past social history and past surgical history.     Allergies   Allergen Reactions   • Penicillins Hives       Current Outpatient Medications:   •  aspirin 81 MG EC tablet, Take 1 tablet by mouth Daily., Disp: 30 tablet, Rfl: 11  •  cholecalciferol (VITAMIN D3) 25 MCG (1000 UT) tablet, Take 1,000 Units by mouth Daily., Disp: , Rfl:   •  coenzyme Q10 100 MG capsule, Take 100 mg by mouth Daily., Disp: , Rfl:   •  Cyanocobalamin (VITAMIN B 12) 500 MCG tablet, Take 1 tablet by mouth Daily., Disp: , Rfl:   •  FLUoxetine (PROzac) 20 MG capsule, Take 1 capsule by mouth Daily., Disp: 30 capsule, Rfl: 3  •  Insulin Glargine, 1 Unit Dial, (Toujeo SoloStar) 300 UNIT/ML solution pen-injector injection, Inject 50 Units into the skin nightly, Disp: 4.5 mL, Rfl: 1  •  Insulin Pen Needle 32G X 4 MM misc, Use one daily, Disp: 100 each, Rfl: 3  •  Multiple Vitamins-Minerals (MULTIVITAMIN " WITH MINERALS) tablet tablet, Take 1 tablet by mouth Daily., Disp: , Rfl:   •  nebivolol (BYSTOLIC) 10 MG tablet, Take 1 tablet by mouth every morning, Disp: 30 tablet, Rfl: 5  •  nitroglycerin (NITROSTAT) 0.4 MG SL tablet, Place 1 tablet under the tongue Every 5 (Five) Minutes As Needed for Chest Pain. Take no more than 3 doses in 15 minutes., Disp: 25 tablet, Rfl: 12  •  pantoprazole (PROTONIX) 40 MG EC tablet, Take 1 tablet by mouth Daily., Disp: 30 tablet, Rfl: 1  •  rosuvastatin (CRESTOR) 40 MG tablet, Take 1 tablet by mouth daily, Disp: 90 tablet, Rfl: 0  •  ticagrelor (BRILINTA) 90 MG tablet tablet, Take 1 tablet by mouth Every 12 (Twelve) Hours., Disp: 60 tablet, Rfl: 11  •  olmesartan-hydrochlorothiazide (BENICAR HCT) 40-12.5 MG per tablet, Take 1 tablet by mouth daily, Disp: 90 tablet, Rfl: 0    Review of Systems   Constitution: Negative for fever and malaise/fatigue.   HENT: Negative for nosebleeds.    Cardiovascular: Negative for chest pain, dyspnea on exertion, irregular heartbeat, leg swelling, near-syncope, orthopnea, palpitations, paroxysmal nocturnal dyspnea and syncope.   Respiratory: Negative for cough, shortness of breath, sputum production and wheezing.    Hematologic/Lymphatic: Negative for bleeding problem. Does not bruise/bleed easily.   Gastrointestinal: Negative for bloating, abdominal pain, nausea and vomiting.   Neurological: Negative for dizziness, focal weakness, headaches, light-headedness and weakness.   Psychiatric/Behavioral: Negative for altered mental status.         ECG 12 Lead    Date/Time: 10/12/2020 10:08 AM  Performed by: Gregoria Horner APRN  Authorized by: Gregoria Horner APRN   Comparison: compared with previous ECG from 9/3/2020  Similar to previous ECG  Rhythm: sinus rhythm  Rate: normal  BPM: 90  Conduction: non-specific intraventricular conduction delay  ST Segments: ST segments normal  T inversion: V2 and V3  Other findings: low voltage    Clinical impression:  "abnormal EKG          /78   Pulse 90   Ht 160 cm (63\")   Wt 110 kg (243 lb)   SpO2 97%   BMI 43.05 kg/m²        Objective:     Vitals signs reviewed.   Constitutional:       General: Not in acute distress.     Appearance: Well-developed and not in distress. Not diaphoretic.   Eyes:      General:         Right eye: No discharge.         Left eye: No discharge.   HENT:      Head: Normocephalic and atraumatic.    Mouth/Throat:      Pharynx: No oropharyngeal exudate.   Neck:      Musculoskeletal: Normal range of motion and neck supple.   Pulmonary:      Effort: Pulmonary effort is normal. No respiratory distress.      Breath sounds: Normal breath sounds. No wheezing. No rhonchi. No rales.   Chest:      Chest wall: Not tender to palpatation.   Cardiovascular:      Normal rate. Regular rhythm.      Murmurs: There is no murmur.      No gallop. No rub.   Edema:     Peripheral edema absent.   Abdominal:      General: There is no distension.      Palpations: Abdomen is soft.      Tenderness: There is no abdominal tenderness.   Musculoskeletal: Normal range of motion.   Skin:     General: Skin is warm and dry.      Coloration: Skin is not pale.      Findings: No erythema or rash.   Neurological:      Mental Status: Alert, oriented to person, place, and time and oriented to person, place and time.   Psychiatric:         Mood and Affect: Mood normal.         Speech: Speech normal.         Behavior: Behavior normal. Behavior is cooperative.         Thought Content: Thought content normal.         Cognition and Memory: Cognition normal.         Judgment: Judgment normal.       Lab Review:   Results for orders placed during the hospital encounter of 09/03/20   Adult Transthoracic Echo Complete W/ Cont if Necessary Per Protocol    Narrative · Left ventricular systolic function is low normal. Estimated EF appears   to be in the range of 51 - 55%.  · The following left ventricular wall segments are hypokinetic: apical "   septal, basal inferoseptal, mid inferoseptal, mid anteroseptal and basal   inferoseptal.  · Left ventricular wall thickness is consistent with mild concentric   hypertrophy.  · No hemodynamically significant valvular abnormalities identified on this   study.      Cardiac Catherization 9/3/20  Impression:  1.  Acute anterior STEMI secondary to LAD occlusion.  2.  Successful PCI to the mid left anterior descending artery with 3.5 x 15 mm Xience drug-eluting stent.  3.  Preserved left ventricular ejection fraction.  4.  Normal left ventricular end-diastolic pressure.        Assessment:          Diagnosis Plan   1. ST elevation myocardial infarction involving left anterior descending (LAD) coronary artery (CMS/[facility])     2. Coronary artery disease involving native coronary artery of native heart without angina pectoris     3. Essential hypertension     4. Type 2 diabetes mellitus without complication, without long-term current use of insulin (CMS/[facility])     5. Mixed hyperlipidemia     6. Class 3 severe obesity due to excess calories with serious comorbidity and body mass index (BMI) of 40.0 to 44.9 in adult (CMS/[facility])            Plan:       STEMI: Anterior STEMI 9/3/2020 with successful PCI by Dr. Guille Nixon to the left anterior descending artery with a 3.5 x 15 mm drug-eluting stent.    Coronary artery disease: Stable.  No complaints of chest pain, chest pressure, or chest discomfort.  Compliant with dual antiplatelet therapy including aspirin 81 mg daily and Brilinta 90 mg twice daily.  She will continue on DAPT for minimum of 12 months with plans for baby aspirin indefinitely.    Hypertension: This is monitored closely by her primary care physician.  She reports a well-controlled blood pressure at home. Continue current therapies with diuretic, ARB, and beta blocker.    Diabetes mellitus: The patient is monitored through her primary care physician's office for management of her diabetes.  She has recently started  a trial of Ozempic.  She did not tolerate Jardiance in the past due to yeast infections.  Her hemoglobin A1c was checked during her hospitalization and was found to be 7.70%.    Hyperlipidemia: She is on rosuvastatin for lipid management.  Her most recent lipid panel was drawn during her hospitalization revealing an LDL of 63.  Continue current management.    Obesity: BMI 43.05.  Diet and exercise has been recommended.  The patient started cardiac rehab, however due to her co-pay, she plans on continuing her exercise regimen through her personal gym membership.    Follow-up with Dr. Nixon in 3 months.  Call sooner if needed.  Continue management of blood pressure and diabetes through primary care physician office.

## 2020-10-13 ENCOUNTER — TELEPHONE (OUTPATIENT)
Dept: CARDIAC REHAB | Facility: HOSPITAL | Age: 40
End: 2020-10-13

## 2020-10-13 ENCOUNTER — APPOINTMENT (OUTPATIENT)
Dept: CARDIAC REHAB | Facility: HOSPITAL | Age: 40
End: 2020-10-13

## 2020-10-13 NOTE — TELEPHONE ENCOUNTER
Patient has not returned back to cardiac rehab or contacted us since first session on 10/1/2020. Closing out chart.

## 2020-10-15 ENCOUNTER — APPOINTMENT (OUTPATIENT)
Dept: CARDIAC REHAB | Facility: HOSPITAL | Age: 40
End: 2020-10-15

## 2020-10-20 ENCOUNTER — APPOINTMENT (OUTPATIENT)
Dept: CARDIAC REHAB | Facility: HOSPITAL | Age: 40
End: 2020-10-20

## 2020-10-20 ENCOUNTER — PROCEDURE VISIT (OUTPATIENT)
Dept: OBSTETRICS AND GYNECOLOGY | Facility: CLINIC | Age: 40
End: 2020-10-20

## 2020-10-20 VITALS
SYSTOLIC BLOOD PRESSURE: 136 MMHG | HEIGHT: 63 IN | DIASTOLIC BLOOD PRESSURE: 82 MMHG | BODY MASS INDEX: 42.7 KG/M2 | WEIGHT: 241 LBS

## 2020-10-20 DIAGNOSIS — Z87.891 FORMER SMOKER: ICD-10-CM

## 2020-10-20 DIAGNOSIS — Z11.3 SCREEN FOR STD (SEXUALLY TRANSMITTED DISEASE): ICD-10-CM

## 2020-10-20 DIAGNOSIS — Z30.430 ENCOUNTER FOR IUD INSERTION: Primary | ICD-10-CM

## 2020-10-20 PROCEDURE — 81025 URINE PREGNANCY TEST: CPT | Performed by: NURSE PRACTITIONER

## 2020-10-20 PROCEDURE — 58300 INSERT INTRAUTERINE DEVICE: CPT | Performed by: NURSE PRACTITIONER

## 2020-10-22 ENCOUNTER — OFFICE VISIT (OUTPATIENT)
Dept: INTERNAL MEDICINE | Age: 40
End: 2020-10-22
Payer: COMMERCIAL

## 2020-10-22 ENCOUNTER — APPOINTMENT (OUTPATIENT)
Dept: CARDIAC REHAB | Facility: HOSPITAL | Age: 40
End: 2020-10-22

## 2020-10-22 VITALS
DIASTOLIC BLOOD PRESSURE: 80 MMHG | OXYGEN SATURATION: 98 % | RESPIRATION RATE: 18 BRPM | HEIGHT: 63 IN | BODY MASS INDEX: 43.23 KG/M2 | SYSTOLIC BLOOD PRESSURE: 120 MMHG | HEART RATE: 89 BPM | WEIGHT: 244 LBS

## 2020-10-22 PROBLEM — I25.10 CORONARY ARTERY DISEASE INVOLVING NATIVE CORONARY ARTERY WITHOUT ANGINA PECTORIS: Status: ACTIVE | Noted: 2020-10-22

## 2020-10-22 PROCEDURE — 99213 OFFICE O/P EST LOW 20 MIN: CPT | Performed by: INTERNAL MEDICINE

## 2020-10-22 PROCEDURE — 3051F HG A1C>EQUAL 7.0%<8.0%: CPT | Performed by: INTERNAL MEDICINE

## 2020-10-22 RX ORDER — SEMAGLUTIDE 1.34 MG/ML
1 INJECTION, SOLUTION SUBCUTANEOUS WEEKLY
Qty: 6 PEN | Refills: 1 | Status: SHIPPED | OUTPATIENT
Start: 2020-10-22 | End: 2020-11-21

## 2020-10-22 RX ORDER — SEMAGLUTIDE 1.34 MG/ML
1 INJECTION, SOLUTION SUBCUTANEOUS WEEKLY
COMMUNITY
End: 2021-01-19 | Stop reason: SDUPTHER

## 2020-10-22 RX ORDER — SEMAGLUTIDE 1.34 MG/ML
1 INJECTION, SOLUTION SUBCUTANEOUS WEEKLY
Qty: 2 PEN | Refills: 3 | Status: SHIPPED | OUTPATIENT
Start: 2020-10-22 | End: 2020-10-22 | Stop reason: SDUPTHER

## 2020-10-22 ASSESSMENT — ENCOUNTER SYMPTOMS
SORE THROAT: 0
WHEEZING: 0
CONSTIPATION: 0
ABDOMINAL PAIN: 0
CHEST TIGHTNESS: 0
COUGH: 0

## 2020-10-22 NOTE — PROGRESS NOTES
Chief Complaint   Patient presents with    Follow-up     6 week follow up     History of presenting illness:  Antonella Lewis is a40 y.o. female who presents today for follow up on her chronic medical conditions as noted below. Type 2 diabetes mellitus without complication, without long-term current use of insulin ([facility])  A1c  7.7 -9/2020 (7.9- 8/2020 (7.4 in 5/2020)(2/2020:9.15) ( 9.6)   Was unable to take Victoza  Any dose- nausea  Started ozempic 6 weeks ago  Now on 0.5 mg weekly  States bs have been good  Some nausea but able to tolerate    CAD  History of acute anterior wall MI 9/3/20  Status post insertion of drug-eluting stent into left anterior descending (LAD) artery  Under care of cardiology  Doing well       HTN- bp has been controlled      Patient Active Problem List    Diagnosis Date Noted    Medication intolerance 01/17/2019     Overview Note:     Intolerance to metformin      Generalized anxiety disorder 09/19/2017    Essential hypertension 09/18/2017    Type 2 diabetes mellitus without complication, without long-term current use of insulin ([facility] [address] 09/18/2017    Fatty liver 09/18/2017    Morbid obesity due to excess calories ([facility] [address] 09/18/2017    Mixed hyperlipidemia 09/18/2017    Chronic midline low back pain without sciatica 09/18/2017     No past medical history on file.    Past Surgical History:   Procedure Laterality Date    CHOLECYSTECTOMY       Current Outpatient Medications   Medication Sig Dispense Refill    Semaglutide, 1 MG/DOSE, (OZEMPIC, 1 MG/DOSE,) 2 MG/1.5ML SOPN Inject 1 mg into the skin once a week      Semaglutide, 1 MG/DOSE, (OZEMPIC, 1 MG/DOSE,) 2 MG/1.5ML SOPN Inject 1 mg into the skin once a week 6 pen 1    TOUJEO SOLOSTAR 300 UNIT/ML SOPN Inject 50 Units into the skin nightly 4.5 mL 1    olmesartan-hydroCHLOROthiazide (BENICAR HCT) 40-12.5 MG per tablet Take 1 tablet by mouth daily 90 tablet 1    aspirin 81 MG EC tablet Take 81 mg by mouth daily      vitamin D 25 MCG (1000 UT) CAPS Take by mouth      coenzyme Q10 100 MG CAPS capsule Take 100 mg by mouth daily      FLUoxetine (PROZAC) 20 MG capsule Take 20 mg by mouth daily      nitroGLYCERIN (NITROSTAT) 0.4 MG SL tablet Place 0.4 mg under the tongue every 5 minutes as needed for Chest pain up to max of 3 total doses. If no relief after 1 dose, call 911.  pantoprazole sodium (PROTONIX) 40 MG PACK packet Take 40 mg by mouth every morning (before breakfast)      ticagrelor (BRILINTA) 90 MG TABS tablet Take 90 mg by mouth 2 times daily      nebivolol (BYSTOLIC) 10 MG tablet Take 1 tablet by mouth every morning 30 tablet 5    rosuvastatin (CRESTOR) 40 MG tablet Take 1 tablet by mouth daily 90 tablet 0    Insulin Pen Needle 32G X 4 MM MISC 1 each by Does not apply route daily 100 each 3     No current facility-administered medications for this visit. Allergies   Allergen Reactions    Penicillins      unknown     Social History     Tobacco Use    Smoking status: Former Smoker     Packs/day: 0.50     Years: 7.00     Pack years: 3.50     Types: Cigarettes     Last attempt to quit:      Years since quittin.8    Smokeless tobacco: Never Used   Substance Use Topics    Alcohol use: No      Family History   Problem Relation Age of Onset    Diabetes Mother     High Blood Pressure Mother     Colon Cancer Mother 61    Diabetes Father        Review of Systems   Constitutional: Positive for fatigue. Negative for chills and fever. HENT: Negative for congestion, ear pain, nosebleeds, postnasal drip and sore throat. Respiratory: Negative for cough, chest tightness and wheezing. Cardiovascular: Negative for chest pain, palpitations and leg swelling. Gastrointestinal: Negative for abdominal pain and constipation. Genitourinary: Negative for dysuria and urgency. Musculoskeletal: Negative. Negative for arthralgias. Skin: Negative for rash. Neurological: Negative for dizziness and headaches. 4 cont  long-acting insulin Toujeo 50 units daily  #5 a1c repeat 3 mo  # 6 fu with bs readigns and lab in 6 weeks    Essential hypertension  cont Benicar 40/12.5       CAD  History of acute anterior wall MI 9/3/20  Status post insertion of drug-eluting stent into left anterior descending (LAD) artery  Under care of cardiology  Doing well    Orders Placed This Encounter   Procedures    Comprehensive Metabolic Panel    Lipid Panel    Hemoglobin A1C    Vitamin D 25 Hydroxy     New Prescriptions    SEMAGLUTIDE, 1 MG/DOSE, (OZEMPIC, 1 MG/DOSE,) 2 MG/1.5ML SOPN    Inject 1 mg into the skin once a week         No follow-ups on file. There are no Patient Instructions on file for this visit. EMR Dragon/transcription disclaimer:Significant part of this  encounter note is electronic transcription/translationof spoken language to printed text. The electronic translation of spoken language may be erroneous, or at times, nonsensical words or phrases may be inadvertently transcribed.  Although I have reviewed the note for sucherrors, some may still exist.

## 2020-10-23 LAB
C TRACH RRNA SPEC QL NAA+PROBE: NEGATIVE
N GONORRHOEA RRNA SPEC QL NAA+PROBE: NEGATIVE

## 2020-10-27 ENCOUNTER — APPOINTMENT (OUTPATIENT)
Dept: CARDIAC REHAB | Facility: HOSPITAL | Age: 40
End: 2020-10-27

## 2020-10-29 ENCOUNTER — APPOINTMENT (OUTPATIENT)
Dept: CARDIAC REHAB | Facility: HOSPITAL | Age: 40
End: 2020-10-29

## 2020-11-02 LAB
B-HCG UR QL: NEGATIVE
INTERNAL NEGATIVE CONTROL: NEGATIVE
INTERNAL POSITIVE CONTROL: POSITIVE
Lab: NORMAL

## 2020-11-03 ENCOUNTER — APPOINTMENT (OUTPATIENT)
Dept: CARDIAC REHAB | Facility: HOSPITAL | Age: 40
End: 2020-11-03

## 2020-11-05 ENCOUNTER — APPOINTMENT (OUTPATIENT)
Dept: CARDIAC REHAB | Facility: HOSPITAL | Age: 40
End: 2020-11-05

## 2020-11-10 ENCOUNTER — APPOINTMENT (OUTPATIENT)
Dept: CARDIAC REHAB | Facility: HOSPITAL | Age: 40
End: 2020-11-10

## 2020-11-20 ENCOUNTER — OFFICE VISIT (OUTPATIENT)
Dept: OBSTETRICS AND GYNECOLOGY | Facility: CLINIC | Age: 40
End: 2020-11-20

## 2020-11-20 VITALS
HEIGHT: 63 IN | WEIGHT: 239 LBS | BODY MASS INDEX: 42.35 KG/M2 | DIASTOLIC BLOOD PRESSURE: 62 MMHG | SYSTOLIC BLOOD PRESSURE: 108 MMHG

## 2020-11-20 DIAGNOSIS — Z87.891 FORMER SMOKER: ICD-10-CM

## 2020-11-20 DIAGNOSIS — Z30.431 IUD CHECK UP: Primary | ICD-10-CM

## 2020-11-20 PROCEDURE — 99213 OFFICE O/P EST LOW 20 MIN: CPT | Performed by: NURSE PRACTITIONER

## 2020-12-06 NOTE — PATIENT INSTRUCTIONS
"BMI for Adults  What is BMI?  Body mass index (BMI) is a number that is calculated from a person's weight and height. BMI can help estimate how much of a person's weight is composed of fat. BMI does not measure body fat directly. Rather, it is an alternative to procedures that directly measure body fat, which can be difficult and expensive.  BMI can help identify people who may be at higher risk for certain medical problems.  What are BMI measurements used for?  BMI is used as a screening tool to identify possible weight problems. It helps determine whether a person is obese, overweight, a healthy weight, or underweight.  BMI is useful for:  · Identifying a weight problem that may be related to a medical condition or may increase the risk for medical problems.  · Promoting changes, such as changes in diet and exercise, to help reach a healthy weight. BMI screening can be repeated to see if these changes are working.  How is BMI calculated?  BMI involves measuring your weight in relation to your height. Both height and weight are measured, and the BMI is calculated from those numbers. This can be done either in English ([place] or metric measurements. Note that charts and online BMI calculators are available to help you find your BMI quickly and easily without having to do these calculations yourself.  To calculate your BMI in English ([place] measurements:    1. Measure your weight in pounds (lb).  2. Multiply the number of pounds by 703.  ? For example, for a person who weighs 180 lb, multiply that number by 703, which equals 126,540.  3. Measure your height in inches. Then multiply that number by itself to get a measurement called \"inches squared.\"  ? For example, for a person who is 70 inches tall, the \"inches squared\" measurement is 70 inches x 70 inches, which equals 4,900 inches squared.  4. Divide the total from step 2 (number of lb x 703) by the total from step 3 (inches squared): 126,540 ÷ 4,900 = 25.8. This is " "your BMI.  To calculate your BMI in metric measurements:  1. Measure your weight in kilograms (kg).  2. Measure your height in meters (m). Then multiply that number by itself to get a measurement called \"meters squared.\"  ? For example, for a person who is 1.75 m tall, the \"meters squared\" measurement is 1.75 m x 1.75 m, which is equal to 3.1 meters squared.  3. Divide the number of kilograms (your weight) by the meters squared number. In this example: 70 ÷ 3.1 = 22.6. This is your BMI.  What do the results mean?  BMI charts are used to identify whether you are underweight, normal weight, overweight, or obese. The following guidelines will be used:  · Underweight: BMI less than 18.5.  · Normal weight: BMI between 18.5 and 24.9.  · Overweight: BMI between 25 and 29.9.  · Obese: BMI of 30 or above.  Keep these notes in mind:  · Weight includes both fat and muscle, so someone with a muscular build, such as an athlete, may have a BMI that is higher than 24.9. In cases like these, BMI is not an accurate measure of body fat.  · To determine if excess body fat is the cause of a BMI of 25 or higher, further assessments may need to be done by a health care provider.  · BMI is usually interpreted in the same way for men and women.  Where to find more information  For more information about BMI, including tools to quickly calculate your BMI, go to these websites:  · Centers for Disease Control and Prevention: www.cdc.gov  · American Heart Association: www.heart.org  · National Heart, Lung, and Blood [place]: www.nhlbi.nih.gov  Summary  · Body mass index (BMI) is a number that is calculated from a person's weight and height.  · BMI may help estimate how much of a person's weight is composed of fat. BMI can help identify those who may be at higher risk for certain medical problems.  · BMI can be measured using English measurements or metric measurements.  · BMI charts are used to identify whether you are underweight, normal " weight, overweight, or obese.  This information is not intended to replace advice given to you by your health care provider. Make sure you discuss any questions you have with your health care provider.  Document Revised: 09/09/2020 Document Reviewed: 07/17/2020  Elsevier Patient Education © 2020 [employer]

## 2020-12-07 RX ORDER — INSULIN GLARGINE 300 U/ML
50 INJECTION, SOLUTION SUBCUTANEOUS NIGHTLY
Qty: 4.5 ML | Refills: 1 | Status: SHIPPED | OUTPATIENT
Start: 2020-12-07 | End: 2021-01-04 | Stop reason: SDUPTHER

## 2020-12-07 NOTE — TELEPHONE ENCOUNTER
Melissa Plan called requesting a refill of the below medication which has been pended for you:     Requested Prescriptions     Pending Prescriptions Disp Refills    Insulin Glargine, 1 Unit Dial, (TOUJEO SOLOSTAR) 300 UNIT/ML SOPN 4.5 mL 1     Sig: Inject 50 Units into the skin nightly       Last Appointment Date: 10/22/2020  Next Appointment Date: 12/18/2020    Allergies   Allergen Reactions    Penicillins      unknown

## 2020-12-14 RX ORDER — ROSUVASTATIN CALCIUM 40 MG/1
40 TABLET, COATED ORAL DAILY
Qty: 90 TABLET | Refills: 4 | Status: SHIPPED | OUTPATIENT
Start: 2020-12-14 | End: 2021-01-19 | Stop reason: SDUPTHER

## 2020-12-14 RX ORDER — OLMESARTAN MEDOXOMIL AND HYDROCHLOROTHIAZIDE 40/12.5 40; 12.5 MG/1; MG/1
1 TABLET ORAL DAILY
Qty: 90 TABLET | Refills: 1 | Status: SHIPPED | OUTPATIENT
Start: 2020-12-14 | End: 2021-01-19 | Stop reason: SDUPTHER

## 2020-12-14 NOTE — TELEPHONE ENCOUNTER
Smiley Krueger called requesting a refill of the below medication which has been pended for you:     Requested Prescriptions     Pending Prescriptions Disp Refills    rosuvastatin (CRESTOR) 40 MG tablet [Pharmacy Med Name: Rosuvastatin Calcium 40 MG Oral Tablet (CRESTOR)] 90 tablet 4     Sig: Take 1 tablet by mouth daily       Last Appointment Date: 10/22/2020  Next Appointment Date: 1/19/2021    Allergies   Allergen Reactions    Penicillins      unknown

## 2020-12-14 NOTE — TELEPHONE ENCOUNTER
Cameron called requesting a refill of the below medication which has been pended for you:     Requested Prescriptions     Pending Prescriptions Disp Refills    olmesartan-hydroCHLOROthiazide (BENICAR HCT) 40-12.5 MG per tablet 90 tablet 1     Sig: Take 1 tablet by mouth daily       Last Appointment Date: 10/22/2020  Next Appointment Date: 12/14/2020    Allergies   Allergen Reactions    Penicillins      unknown

## 2020-12-29 RX ORDER — FLUOXETINE HYDROCHLORIDE 20 MG/1
CAPSULE ORAL
Qty: 30 CAPSULE | Refills: 3 | Status: SHIPPED | OUTPATIENT
Start: 2020-12-29 | End: 2021-01-04 | Stop reason: SDUPTHER

## 2020-12-29 NOTE — TELEPHONE ENCOUNTER
Cameron called requesting a refill of the below medication which has been pended for you:     Requested Prescriptions     Pending Prescriptions Disp Refills    FLUoxetine (PROZAC) 20 MG capsule [Pharmacy Med Name: FLUoxetine HCl 20 MG Oral Capsule (PROzac)] 30 capsule 3     Sig: Take 1 capsule by mouth Daily.        Last Appointment Date: 10/22/2020  Next Appointment Date: 1/19/2021    Allergies   Allergen Reactions    Penicillins      unknown

## 2021-01-04 DIAGNOSIS — E11.9 TYPE 2 DIABETES MELLITUS WITHOUT COMPLICATION, WITHOUT LONG-TERM CURRENT USE OF INSULIN (HCC): ICD-10-CM

## 2021-01-04 RX ORDER — FLUOXETINE HYDROCHLORIDE 20 MG/1
20 CAPSULE ORAL DAILY
Qty: 30 CAPSULE | Refills: 3 | Status: SHIPPED | OUTPATIENT
Start: 2021-01-04 | End: 2021-01-19 | Stop reason: SDUPTHER

## 2021-01-04 RX ORDER — INSULIN GLARGINE 300 U/ML
50 INJECTION, SOLUTION SUBCUTANEOUS NIGHTLY
Qty: 4.5 ML | Refills: 1 | Status: SHIPPED | OUTPATIENT
Start: 2021-01-04 | End: 2021-01-19 | Stop reason: SDUPTHER

## 2021-01-04 NOTE — TELEPHONE ENCOUNTER
Cameron called requesting a refill of the below medication which has been pended for you:     Requested Prescriptions     Pending Prescriptions Disp Refills    Insulin Pen Needle 32G X 4 MM MISC 100 each 3     Si each by Does not apply route daily    Insulin Glargine, 1 Unit Dial, (TOUJEO SOLOSTAR) 300 UNIT/ML SOPN 4.5 mL 1     Sig: Inject 50 Units into the skin nightly    FLUoxetine (PROZAC) 20 MG capsule 30 capsule 3     Sig: Take 1 capsule by mouth daily       Last Appointment Date: 10/22/2020  Next Appointment Date: 2021    Allergies   Allergen Reactions    Penicillins      unknown

## 2021-01-08 ENCOUNTER — TELEPHONE (OUTPATIENT)
Dept: BARIATRICS/WEIGHT MGMT | Facility: CLINIC | Age: 41
End: 2021-01-08

## 2021-01-12 ENCOUNTER — OFFICE VISIT (OUTPATIENT)
Dept: CARDIOLOGY | Facility: CLINIC | Age: 41
End: 2021-01-12

## 2021-01-12 VITALS
WEIGHT: 244 LBS | HEART RATE: 90 BPM | DIASTOLIC BLOOD PRESSURE: 68 MMHG | SYSTOLIC BLOOD PRESSURE: 112 MMHG | HEIGHT: 63 IN | BODY MASS INDEX: 43.23 KG/M2

## 2021-01-12 DIAGNOSIS — I21.02 ST ELEVATION MYOCARDIAL INFARCTION INVOLVING LEFT ANTERIOR DESCENDING (LAD) CORONARY ARTERY (HCC): Chronic | ICD-10-CM

## 2021-01-12 DIAGNOSIS — E11.9 TYPE 2 DIABETES MELLITUS WITHOUT COMPLICATION, WITHOUT LONG-TERM CURRENT USE OF INSULIN (HCC): Chronic | ICD-10-CM

## 2021-01-12 DIAGNOSIS — E78.2 MIXED HYPERLIPIDEMIA: Chronic | ICD-10-CM

## 2021-01-12 DIAGNOSIS — I25.10 CORONARY ARTERY DISEASE INVOLVING NATIVE CORONARY ARTERY OF NATIVE HEART WITHOUT ANGINA PECTORIS: Primary | Chronic | ICD-10-CM

## 2021-01-12 DIAGNOSIS — E66.01 CLASS 3 SEVERE OBESITY DUE TO EXCESS CALORIES WITH SERIOUS COMORBIDITY AND BODY MASS INDEX (BMI) OF 40.0 TO 44.9 IN ADULT (HCC): Chronic | ICD-10-CM

## 2021-01-12 DIAGNOSIS — I10 ESSENTIAL HYPERTENSION: Chronic | ICD-10-CM

## 2021-01-12 PROBLEM — E66.813 CLASS 3 SEVERE OBESITY DUE TO EXCESS CALORIES WITH SERIOUS COMORBIDITY AND BODY MASS INDEX (BMI) OF 40.0 TO 44.9 IN ADULT: Chronic | Status: ACTIVE | Noted: 2020-04-06

## 2021-01-12 PROCEDURE — 93000 ELECTROCARDIOGRAM COMPLETE: CPT | Performed by: NURSE PRACTITIONER

## 2021-01-12 PROCEDURE — 99214 OFFICE O/P EST MOD 30 MIN: CPT | Performed by: NURSE PRACTITIONER

## 2021-01-12 NOTE — PROGRESS NOTES
Subjective:     Encounter Date:01/12/2021      Patient ID: Harry Panda is a 40 y.o. female who has a PMH of CAD STEMI, DM, HTN, HLD, and obesity. She presents today for follow up.     Chief Complaint: Routine Follow up  Coronary Artery Disease  Presents for follow-up visit. Pertinent negatives include no chest pain, chest pressure, dizziness, leg swelling, palpitations or shortness of breath. Risk factors include hyperlipidemia and obesity. The symptoms have been stable. Compliance with diet is good. Compliance with exercise is good. Compliance with medications is good.   Hypertension  This is a chronic problem. The current episode started more than 1 year ago. The problem is controlled. Pertinent negatives include no chest pain, headaches, malaise/fatigue, orthopnea, palpitations, peripheral edema, PND or shortness of breath. Risk factors for coronary artery disease include diabetes mellitus, dyslipidemia and obesity. Current antihypertension treatment includes diuretics, beta blockers and angiotensin blockers. The current treatment provides significant improvement. There are no compliance problems.  There is no history of angina, CAD/MI, CVA or heart failure.   Hyperlipidemia  This is a chronic problem. The current episode started more than 1 year ago. The problem is controlled. Recent lipid tests were reviewed and are low. Exacerbating diseases include diabetes and obesity. Pertinent negatives include no chest pain, focal weakness or shortness of breath. Current antihyperlipidemic treatment includes statins. The current treatment provides significant improvement of lipids. Risk factors for coronary artery disease include diabetes mellitus, dyslipidemia, obesity and hypertension.     Harry presents today for follow up. She was treated for anterior STEMI in early September when she presented to the hospital with complaints of chest pain.  Since her hospitalization she has been doing well. She denies any  chest pain, chest pressure, chest discomfort.  She denies any shortness of breath, dyspnea on exertion.  She continues to exercise at home without any side effects.  She denies any palpitations, near syncope, lightheadedness, dizziness.  She is compliant with her medications and follows with her PCP closely.    The following portions of the patient's history were reviewed and updated as appropriate: allergies, current medications, past family history, past medical history, past social history and past surgical history.     Allergies   Allergen Reactions   • Penicillins Hives       Current Outpatient Medications:   •  aspirin 81 MG EC tablet, Take 1 tablet by mouth Daily., Disp: 30 tablet, Rfl: 11  •  cholecalciferol (VITAMIN D3) 25 MCG (1000 UT) tablet, Take 1,000 Units by mouth Daily., Disp: , Rfl:   •  coenzyme Q10 100 MG capsule, Take 100 mg by mouth Daily., Disp: , Rfl:   •  Cyanocobalamin (VITAMIN B 12) 500 MCG tablet, Take 1 tablet by mouth Daily., Disp: , Rfl:   •  FLUoxetine (PROzac) 20 MG capsule, Take 1 capsule by mouth daily, Disp: 30 capsule, Rfl: 3  •  Insulin Glargine, 1 Unit Dial, (Toujeo SoloStar) 300 UNIT/ML solution pen-injector injection, Inject 50 Units into the skin nightly, Disp: 4.5 mL, Rfl: 1  •  Insulin Pen Needle 32G X 4 MM misc, Inject 1 each under the skin into the appropriate area as directed Daily., Disp: 100 each, Rfl: 3  •  Levonorgestrel (MIRENA, 52 MG, IU), 52 mg by Intrauterine route Continuous., Disp: , Rfl:   •  Multiple Vitamins-Minerals (MULTIVITAMIN WITH MINERALS) tablet tablet, Take 1 tablet by mouth Daily., Disp: , Rfl:   •  nebivolol (BYSTOLIC) 10 MG tablet, Take 1 tablet by mouth every morning, Disp: 30 tablet, Rfl: 5  •  nitroglycerin (NITROSTAT) 0.4 MG SL tablet, Place 1 tablet under the tongue Every 5 (Five) Minutes As Needed for Chest Pain. Take no more than 3 doses in 15 minutes., Disp: 25 tablet, Rfl: 12  •  olmesartan-hydrochlorothiazide (BENICAR HCT) 40-12.5 MG per  "tablet, Take 1 tablet by mouth daily, Disp: 90 tablet, Rfl: 1  •  pantoprazole (PROTONIX) 40 MG EC tablet, Take 1 tablet by mouth Daily., Disp: 30 tablet, Rfl: 1  •  rosuvastatin (CRESTOR) 40 MG tablet, Take 1 tablet by mouth daily, Disp: 90 tablet, Rfl: 4  •  Semaglutide, 1 MG/DOSE, (Ozempic, 1 MG/DOSE,) 2 MG/1.5ML solution pen-injector, Inject 1 mg into the skin once a week, Disp: 6 pen, Rfl: 1  •  ticagrelor (BRILINTA) 90 MG tablet tablet, Take 1 tablet by mouth Every 12 (Twelve) Hours., Disp: 60 tablet, Rfl: 11    Review of Systems   Constitution: Negative for fever and malaise/fatigue.   HENT: Negative for nosebleeds.    Cardiovascular: Negative for chest pain, dyspnea on exertion, irregular heartbeat, leg swelling, near-syncope, orthopnea, palpitations, paroxysmal nocturnal dyspnea and syncope.   Respiratory: Negative for cough, shortness of breath, sputum production and wheezing.    Hematologic/Lymphatic: Negative for bleeding problem. Does not bruise/bleed easily.   Gastrointestinal: Negative for bloating, abdominal pain, nausea and vomiting.   Neurological: Negative for dizziness, focal weakness, headaches, light-headedness and weakness.   Psychiatric/Behavioral: Negative for altered mental status.         ECG 12 Lead    Date/Time: 1/12/2021 8:37 AM  Performed by: Gregoria Horner APRN  Authorized by: Gregoria Horner APRN   Comparison: compared with previous ECG from 10/12/2020  Similar to previous ECG  Rhythm: sinus rhythm  Rate: normal  BPM: 90  Conduction: incomplete right bundle branch block  ST Segments: ST segments normal  T inversion: V2  T flattening: V3  QRS axis: normal  Other findings: non-specific ST-T wave changes    Clinical impression: abnormal EKG          /68   Pulse 90   Ht 160 cm (63\")   Wt 111 kg (244 lb)   BMI 43.22 kg/m²        Objective:     Vitals signs reviewed.   Constitutional:       General: Not in acute distress.     Appearance: Normal appearance. Well-developed, " well-groomed and not in distress. Not diaphoretic.   Eyes:      General:         Right eye: No discharge.         Left eye: No discharge.   HENT:      Head: Normocephalic and atraumatic.    Mouth/Throat:      Pharynx: No oropharyngeal exudate.   Neck:      Musculoskeletal: Normal range of motion and neck supple.   Pulmonary:      Effort: Pulmonary effort is normal. No respiratory distress.      Breath sounds: Normal breath sounds. No wheezing. No rhonchi. No rales.   Chest:      Chest wall: Not tender to palpatation.   Cardiovascular:      Normal rate. Regular rhythm.      Murmurs: There is no murmur.      No gallop. No rub.   Edema:     Peripheral edema absent.   Abdominal:      General: There is no distension.      Palpations: Abdomen is soft.      Tenderness: There is no abdominal tenderness.   Musculoskeletal: Normal range of motion.   Skin:     General: Skin is warm and dry.      Coloration: Skin is not pale.      Findings: No erythema or rash.   Neurological:      Mental Status: Alert, oriented to person, place, and time and oriented to person, place and time.   Psychiatric:         Attention and Perception: Attention normal.         Mood and Affect: Mood normal.         Speech: Speech normal.         Behavior: Behavior normal. Behavior is cooperative.         Thought Content: Thought content normal.         Cognition and Memory: Cognition normal.         Judgment: Judgment normal.       Lab Review:   Results for orders placed during the hospital encounter of 09/03/20   Adult Transthoracic Echo Complete W/ Cont if Necessary Per Protocol    Narrative · Left ventricular systolic function is low normal. Estimated EF appears   to be in the range of 51 - 55%.  · The following left ventricular wall segments are hypokinetic: apical   septal, basal inferoseptal, mid inferoseptal, mid anteroseptal and basal   inferoseptal.  · Left ventricular wall thickness is consistent with mild concentric   hypertrophy.  · No  hemodynamically significant valvular abnormalities identified on this   study.      Cardiac Catherization 9/3/20  Impression:  1.  Acute anterior STEMI secondary to LAD occlusion.  2.  Successful PCI to the mid left anterior descending artery with 3.5 x 15 mm Xience drug-eluting stent.  3.  Preserved left ventricular ejection fraction.  4.  Normal left ventricular end-diastolic pressure.        Assessment:          Diagnosis Plan   1. Coronary artery disease involving native coronary artery of native heart without angina pectoris     2. ST elevation myocardial infarction involving left anterior descending (LAD) coronary artery (CMS/[facility])     3. Essential hypertension     4. Mixed hyperlipidemia     5. Type 2 diabetes mellitus without complication, without long-term current use of insulin (CMS/[facility])     6. Class 3 severe obesity due to excess calories with serious comorbidity and body mass index (BMI) of 40.0 to 44.9 in adult (CMS/[facility])            Plan:       Coronary artery disease: Stable.  No complaints of chest pain, chest pressure, or chest discomfort.  Compliant with dual antiplatelet therapy including aspirin 81 mg daily and Brilinta 90 mg twice daily.  She will continue on DAPT for minimum of 12 months with plans for baby aspirin indefinitely.    STEMI: Anterior STEMI 9/3/2020 with successful PCI by Dr. Guille Nixon to the left anterior descending artery with a 3.5 x 15 mm drug-eluting stent.    Hypertension: This is monitored closely by her primary care physician.  She reports a well-controlled blood pressure at home. Continue current therapies with diuretic, ARB, and beta blocker.    Diabetes mellitus: The patient is monitored through her primary care physician's office for management of her diabetes.  She remains on Ozempic.  She did not tolerate Jardiance in the past due to yeast infections.  Her hemoglobin A1c was checked during her hospitalization and was found to be 7.70%.  She is due to see her PCP  soon.     Hyperlipidemia: She is on rosuvastatin for lipid management.  Her most recent lipid panel was drawn during her hospitalization revealing an LDL of 63.  Continue current management with LDL goal <70.    Obesity: BMI 43.22  Diet and exercise has been recommended.      Follow-up with Dr. Nixon in 6 months, sooner if needed.

## 2021-01-14 ENCOUNTER — TRANSCRIBE ORDERS (OUTPATIENT)
Dept: ADMINISTRATIVE | Facility: HOSPITAL | Age: 41
End: 2021-01-14

## 2021-01-14 ENCOUNTER — LAB (OUTPATIENT)
Dept: LAB | Facility: HOSPITAL | Age: 41
End: 2021-01-14

## 2021-01-14 DIAGNOSIS — E78.2 MIXED HYPERLIPIDEMIA: ICD-10-CM

## 2021-01-14 DIAGNOSIS — E11.9 TYPE 2 DIABETES MELLITUS WITHOUT COMPLICATION, UNSPECIFIED WHETHER LONG TERM INSULIN USE (HCC): ICD-10-CM

## 2021-01-14 DIAGNOSIS — I10 ESSENTIAL (PRIMARY) HYPERTENSION: Primary | ICD-10-CM

## 2021-01-14 LAB
25(OH)D3 SERPL-MCNC: 38 NG/ML (ref 30–100)
ALBUMIN SERPL-MCNC: 4.5 G/DL
ALBUMIN SERPL-MCNC: 4.5 G/DL (ref 3.5–5)
ALBUMIN/GLOB SERPL: 1.3 G/DL (ref 1.1–2.5)
ALP BLD-CCNC: 62 U/L
ALP SERPL-CCNC: 62 U/L (ref 24–120)
ALT SERPL W P-5'-P-CCNC: 28 U/L (ref 0–35)
ALT SERPL-CCNC: 28 U/L
ANION GAP SERPL CALCULATED.3IONS-SCNC: 9 MMOL/L
ANION GAP SERPL CALCULATED.3IONS-SCNC: 9 MMOL/L (ref 4–13)
AST SERPL-CCNC: 32 U/L
AST SERPL-CCNC: 32 U/L (ref 7–45)
AVERAGE GLUCOSE: NORMAL
BILIRUB SERPL-MCNC: 0.7 MG/DL (ref 0.1–1)
BILIRUB SERPL-MCNC: 0.7 MG/DL (ref 0.1–1.4)
BUN BLDV-MCNC: 10 MG/DL
BUN SERPL-MCNC: 10 MG/DL (ref 5–21)
BUN/CREAT SERPL: 17.9
CALCIUM SERPL-MCNC: 9.4 MG/DL
CALCIUM SPEC-SCNC: 9.4 MG/DL (ref 8.4–10.4)
CHLORIDE BLD-SCNC: 104 MMOL/L
CHLORIDE SERPL-SCNC: 104 MMOL/L (ref 98–110)
CHOLEST SERPL-MCNC: 123 MG/DL (ref 130–200)
CHOLESTEROL, TOTAL: 123 MG/DL
CHOLESTEROL/HDL RATIO: 1.36
CO2 SERPL-SCNC: 28 MMOL/L (ref 24–31)
CO2: 28 MMOL/L
CREAT SERPL-MCNC: 0.56 MG/DL
CREAT SERPL-MCNC: 0.56 MG/DL (ref 0.5–1.4)
GFR CALCULATED: 120
GFR SERPL CREATININE-BSD FRML MDRD: 120 ML/MIN/1.73
GLOBULIN UR ELPH-MCNC: 3.5 GM/DL
GLUCOSE BLD-MCNC: 151 MG/DL
GLUCOSE SERPL-MCNC: 151 MG/DL (ref 70–100)
HBA1C MFR BLD: 7.8 %
HBA1C MFR BLD: 7.8 % (ref 4.8–5.9)
HDLC SERPL-MCNC: 39 MG/DL
HDLC SERPL-MCNC: 39 MG/DL (ref 35–70)
LDL CHOLESTEROL CALCULATED: 58 MG/DL (ref 0–160)
LDLC SERPL CALC-MCNC: 58 MG/DL (ref 0–99)
LDLC/HDLC SERPL: 1.36 {RATIO}
NONHDLC SERPL-MCNC: NORMAL MG/DL
POTASSIUM SERPL-SCNC: 4 MMOL/L
POTASSIUM SERPL-SCNC: 4 MMOL/L (ref 3.5–5.3)
PROT SERPL-MCNC: 8 G/DL (ref 6.3–8.7)
SODIUM BLD-SCNC: 141 MMOL/L
SODIUM SERPL-SCNC: 141 MMOL/L (ref 135–145)
TOTAL PROTEIN: 8
TRIGL SERPL-MCNC: 154 MG/DL
TRIGL SERPL-MCNC: 154 MG/DL (ref 0–149)
VITAMIN D 25-HYDROXY: 38
VITAMIN D2, 25 HYDROXY: NORMAL
VITAMIN D3,25 HYDROXY: NORMAL
VLDLC SERPL CALC-MCNC: 26 MG/DL
VLDLC SERPL-MCNC: 26 MG/DL (ref 5–40)

## 2021-01-14 PROCEDURE — 36415 COLL VENOUS BLD VENIPUNCTURE: CPT | Performed by: INTERNAL MEDICINE

## 2021-01-14 PROCEDURE — 80061 LIPID PANEL: CPT | Performed by: INTERNAL MEDICINE

## 2021-01-14 PROCEDURE — 80053 COMPREHEN METABOLIC PANEL: CPT | Performed by: INTERNAL MEDICINE

## 2021-01-14 PROCEDURE — 82306 VITAMIN D 25 HYDROXY: CPT | Performed by: INTERNAL MEDICINE

## 2021-01-14 PROCEDURE — 83036 HEMOGLOBIN GLYCOSYLATED A1C: CPT | Performed by: INTERNAL MEDICINE

## 2021-01-19 ENCOUNTER — OFFICE VISIT (OUTPATIENT)
Dept: INTERNAL MEDICINE | Age: 41
End: 2021-01-19
Payer: COMMERCIAL

## 2021-01-19 VITALS
OXYGEN SATURATION: 98 % | HEIGHT: 63 IN | HEART RATE: 76 BPM | WEIGHT: 243 LBS | BODY MASS INDEX: 43.05 KG/M2 | RESPIRATION RATE: 18 BRPM | DIASTOLIC BLOOD PRESSURE: 88 MMHG | SYSTOLIC BLOOD PRESSURE: 128 MMHG

## 2021-01-19 DIAGNOSIS — I10 ESSENTIAL HYPERTENSION: ICD-10-CM

## 2021-01-19 DIAGNOSIS — I25.10 CORONARY ARTERY DISEASE INVOLVING NATIVE CORONARY ARTERY OF NATIVE HEART WITHOUT ANGINA PECTORIS: ICD-10-CM

## 2021-01-19 DIAGNOSIS — E55.9 VITAMIN D DEFICIENCY: ICD-10-CM

## 2021-01-19 DIAGNOSIS — E11.9 TYPE 2 DIABETES MELLITUS WITHOUT COMPLICATION, WITHOUT LONG-TERM CURRENT USE OF INSULIN (HCC): Primary | ICD-10-CM

## 2021-01-19 DIAGNOSIS — E78.2 MIXED HYPERLIPIDEMIA: ICD-10-CM

## 2021-01-19 PROCEDURE — 3051F HG A1C>EQUAL 7.0%<8.0%: CPT | Performed by: INTERNAL MEDICINE

## 2021-01-19 PROCEDURE — 99214 OFFICE O/P EST MOD 30 MIN: CPT | Performed by: INTERNAL MEDICINE

## 2021-01-19 RX ORDER — ROSUVASTATIN CALCIUM 40 MG/1
40 TABLET, COATED ORAL DAILY
Qty: 90 TABLET | Refills: 1 | Status: SHIPPED | OUTPATIENT
Start: 2021-01-19 | End: 2021-12-23 | Stop reason: SDUPTHER

## 2021-01-19 RX ORDER — OLMESARTAN MEDOXOMIL AND HYDROCHLOROTHIAZIDE 40/12.5 40; 12.5 MG/1; MG/1
1 TABLET ORAL DAILY
Qty: 90 TABLET | Refills: 1 | Status: SHIPPED | OUTPATIENT
Start: 2021-01-19 | End: 2021-12-23 | Stop reason: SDUPTHER

## 2021-01-19 RX ORDER — SEMAGLUTIDE 1.34 MG/ML
1 INJECTION, SOLUTION SUBCUTANEOUS WEEKLY
Qty: 6 PEN | Refills: 1 | Status: SHIPPED | OUTPATIENT
Start: 2021-01-19 | End: 2021-12-23 | Stop reason: SDUPTHER

## 2021-01-19 RX ORDER — INSULIN GLARGINE 300 U/ML
50 INJECTION, SOLUTION SUBCUTANEOUS NIGHTLY
Qty: 4.5 ML | Refills: 1 | Status: SHIPPED
Start: 2021-01-19 | End: 2021-03-01

## 2021-01-19 RX ORDER — NEBIVOLOL 10 MG/1
TABLET ORAL
Qty: 90 TABLET | Refills: 1 | Status: SHIPPED | OUTPATIENT
Start: 2021-01-19 | End: 2021-07-08

## 2021-01-19 RX ORDER — FLUOXETINE HYDROCHLORIDE 20 MG/1
20 CAPSULE ORAL DAILY
Qty: 90 CAPSULE | Refills: 1 | Status: SHIPPED | OUTPATIENT
Start: 2021-01-19 | End: 2021-11-08

## 2021-01-19 ASSESSMENT — PATIENT HEALTH QUESTIONNAIRE - PHQ9
SUM OF ALL RESPONSES TO PHQ QUESTIONS 1-9: 0
SUM OF ALL RESPONSES TO PHQ QUESTIONS 1-9: 0
1. LITTLE INTEREST OR PLEASURE IN DOING THINGS: 0
2. FEELING DOWN, DEPRESSED OR HOPELESS: 0
SUM OF ALL RESPONSES TO PHQ9 QUESTIONS 1 & 2: 0

## 2021-01-19 ASSESSMENT — ENCOUNTER SYMPTOMS
SORE THROAT: 0
WHEEZING: 0
COUGH: 0
CONSTIPATION: 0
CHEST TIGHTNESS: 0
ABDOMINAL PAIN: 0

## 2021-01-19 NOTE — PROGRESS NOTES
Chief Complaint   Patient presents with    Follow-up     2 month     History of presenting illness:  Nikki Villegas is a43 y.o. female who presents today for follow up on her chronic medical conditions as noted below. Type 2 diabetes mellitus   A1c  7.7 -9/2020 (7.9- 8/2020 (7.4 in 5/2020)(2/2020:9.15) ( 9.6)   Was unable to take Victoza any dose- nausea  Started ozempic fall 2020  Now on 1 mg weekly  States bs have been good    CAD  History of acute anterior wall MI 9/3/20  Status post insertion of drug-eluting stent into left anterior descending (LAD) artery  Under care of cardiology  Doing well     HTN- bp has been controlled       Patient Active Problem List    Diagnosis Date Noted    Coronary artery disease involving native coronary artery without angina pectoris 10/22/2020     Overview Note:     History of acute anterior wall MI 9/3/20  Status post insertion of drug-eluting stent into left anterior descending (LAD) artery      Medication intolerance 01/17/2019     Overview Note:     Intolerance to metformin      Generalized anxiety disorder 09/19/2017    Essential hypertension 09/18/2017    Type 2 diabetes mellitus without complication, without long-term current use of insulin ([facility] [address] 09/18/2017    Fatty liver 09/18/2017    Morbid obesity due to excess calories ([facility] [address] 09/18/2017    Mixed hyperlipidemia 09/18/2017    Chronic midline low back pain without sciatica 09/18/2017     No past medical history on file.    Past Surgical History:   Procedure Laterality Date    CHOLECYSTECTOMY       Current Outpatient Medications   Medication Sig Dispense Refill    rosuvastatin (CRESTOR) 40 MG tablet Take 1 tablet by mouth daily 90 tablet 1    olmesartan-hydroCHLOROthiazide (BENICAR HCT) 40-12.5 MG per tablet Take 1 tablet by mouth daily 90 tablet 1    nebivolol (BYSTOLIC) 10 MG tablet Take 1 tablet by mouth every morning 90 tablet 1  Insulin Pen Needle 32G X 4 MM MISC 1 each by Does not apply route daily 100 each 3    Insulin Glargine, 1 Unit Dial, (TOUJEO SOLOSTAR) 300 UNIT/ML SOPN Inject 50 Units into the skin nightly 4.5 mL 1    FLUoxetine (PROZAC) 20 MG capsule Take 1 capsule by mouth daily 90 capsule 1    Semaglutide, 1 MG/DOSE, (OZEMPIC, 1 MG/DOSE,) 2 MG/1.5ML SOPN Inject 1 mg into the skin once a week 6 pen 1    aspirin 81 MG EC tablet Take 81 mg by mouth daily      vitamin D 25 MCG (1000 UT) CAPS Take by mouth      coenzyme Q10 100 MG CAPS capsule Take 100 mg by mouth daily      nitroGLYCERIN (NITROSTAT) 0.4 MG SL tablet Place 0.4 mg under the tongue every 5 minutes as needed for Chest pain up to max of 3 total doses. If no relief after 1 dose, call 911.  pantoprazole sodium (PROTONIX) 40 MG PACK packet Take 40 mg by mouth every morning (before breakfast)      ticagrelor (BRILINTA) 90 MG TABS tablet Take 90 mg by mouth 2 times daily       No current facility-administered medications for this visit. Allergies   Allergen Reactions    Penicillins      unknown     Social History     Tobacco Use    Smoking status: Former Smoker     Packs/day: 0.50     Years: 7.00     Pack years: 3.50     Types: Cigarettes     Quit date: 2006     Years since quitting: 15.0    Smokeless tobacco: Never Used   Substance Use Topics    Alcohol use: No      Family History   Problem Relation Age of Onset    Diabetes Mother     High Blood Pressure Mother     Colon Cancer Mother 61    Diabetes Father        Review of Systems   Constitutional: Negative for chills, fatigue and fever. HENT: Negative for congestion, ear pain, nosebleeds, postnasal drip and sore throat. Respiratory: Negative for cough, chest tightness and wheezing. Cardiovascular: Negative for chest pain, palpitations and leg swelling. Gastrointestinal: Negative for abdominal pain and constipation. Genitourinary: Negative for dysuria and urgency. Musculoskeletal: Negative. Negative for arthralgias. Skin: Negative for rash. Neurological: Negative for dizziness and headaches. Psychiatric/Behavioral: Negative. Vitals:    01/19/21 0836   BP: 128/88   Site: Left Upper Arm   Position: Sitting   Cuff Size: Large Adult   Pulse: 76   Resp: 18   SpO2: 98%   Weight: 243 lb (110.2 kg)   Height: 5' 3\" (1.6 m)     Body mass index is 43.05 kg/m². Physical Exam  Constitutional:       Appearance: She is well-developed. HENT:      Right Ear: External ear normal.      Left Ear: External ear normal.      Mouth/Throat:      Pharynx: No oropharyngeal exudate. Eyes:      Conjunctiva/sclera: Conjunctivae normal.      Pupils: Pupils are equal, round, and reactive to light. Neck:      Musculoskeletal: Neck supple. Thyroid: No thyromegaly. Vascular: No JVD. Cardiovascular:      Rate and Rhythm: Normal rate. Heart sounds: Normal heart sounds. No murmur. Pulmonary:      Effort: No respiratory distress. Breath sounds: Normal breath sounds. No wheezing or rales. Chest:      Chest wall: No tenderness. Abdominal:      General: Bowel sounds are normal.      Palpations: Abdomen is soft. Musculoskeletal: Normal range of motion. Lymphadenopathy:      Cervical: No cervical adenopathy. Skin:     General: Skin is warm. Findings: No rash. Neurological:      Mental Status: She is oriented to person, place, and time.          Lab Review   Orders Only on 01/18/2021   Component Date Value    Vit D, 25-Hydroxy 01/14/2021 38.0    Orders Only on 01/14/2021   Component Date Value    Hemoglobin A1C 01/14/2021 7.8     Cholesterol, Total 01/14/2021 123     HDL 01/14/2021 39     LDL Calculated 01/14/2021 58     Triglycerides 01/14/2021 154     Chol/HDL Ratio 01/14/2021 1.36     VLDL 01/14/2021 26     Sodium 01/14/2021 141     Chloride 01/14/2021 104     Potassium 01/14/2021 4.0     BUN 01/14/2021 10     CREATININE 01/14/2021 0.56  Glucose 01/14/2021 151     AST 01/14/2021 32     ALT 01/14/2021 28     Calcium 01/14/2021 9.4     Total Protein 01/14/2021 8.0     CO2 01/14/2021 28.0     Alb 01/14/2021 4.50     Alkaline Phosphatase 01/14/2021 62     Total Bilirubin 01/14/2021 0.7     Gfr Calculated 01/14/2021 120     Anion Gap 01/14/2021 9.0            ASSESSMENT/PLAN:    Type 2 diabetes mellitus without complication, without long-term current use of insulin (HCC)  A1c  7.7 -9/2020 (7.9- 8/2020 (7.4 in 5/2020)(2/2020:9.15) ( 9.6)   Was unable to take Victoza  Any dose- nausea  Started ozempic 6 weeks ago  Now on 0.5 mg weekly  States bs have been good  Some nausea but able to tolerate     Recommendations  1. Ozempic 1 mg weekly   # 2 strict diet and wt loss  # 3 exercise  # 4 rx  long-acting insulin Toujeo 50 units daily  #5 a1c repeat 3 mo  # 6 fu with bs readigns and lab in 6 weeks  # 7  Endocrinology referral     Essential hypertension  Lab results reviewed  Blood pressure readings discussed  Kidney function normal  Rx  Benicar 40/12. 5        CAD  History of acute anterior wall MI 9/3/20  Status post insertion of drug-eluting stent into left anterior descending (LAD) artery  Under care of cardiology  Doing well    Obesity  Pt was givencounseling about diet and exercise including education on what calories are, where calories come from, the need for portion control,following lower carbohydrate dietary regimen and healthy snacks along side an active lifestyle with supplementary exercise of approx 30 minutes a week, 5 days a week of exercise for weight loss. The patient voiced increased understanding of the topics discussed.         Orders Placed This Encounter   Procedures    CBC Auto Differential    Comprehensive Metabolic Panel    Hemoglobin A1C    Lipid Panel    Microalbumin / Creatinine Urine Ratio    Vitamin D 25 Hydroxy    Urinalysis    TSH without Reflex    External Referral To Endocrinology     New Prescriptions

## 2021-03-01 ENCOUNTER — PATIENT MESSAGE (OUTPATIENT)
Dept: INTERNAL MEDICINE | Age: 41
End: 2021-03-01

## 2021-03-01 RX ORDER — INSULIN GLARGINE 100 [IU]/ML
50 INJECTION, SOLUTION SUBCUTANEOUS NIGHTLY
Qty: 15 ML | Refills: 0 | Status: SHIPPED
Start: 2021-04-01 | End: 2022-01-05 | Stop reason: CLARIF

## 2021-03-01 NOTE — TELEPHONE ENCOUNTER
From: Ada Gallo  To: Len Butts MD  Sent: 3/1/2021 9:01 AM CST  Subject: Prescription Question    Hello. I got a message from my insurance that says as of April 1st they will no longer cover the toujeo. They will however cover basaglar, levemir or tresiba. My appointment with the endocrinologist isn't until the middle of April. Just let me know what you would like to do. Thanks!

## 2021-03-29 RX ORDER — PANTOPRAZOLE SODIUM 40 MG/1
40 TABLET, DELAYED RELEASE ORAL DAILY
Qty: 30 TABLET | Refills: 1 | Status: SHIPPED | OUTPATIENT
Start: 2021-03-29 | End: 2021-08-04

## 2021-03-29 NOTE — TELEPHONE ENCOUNTER
Will refill once and patient needs to keep f/u appointment and discuss future refills with Dr. Marlow at next appointment.

## 2021-05-04 ENCOUNTER — TRANSCRIBE ORDERS (OUTPATIENT)
Dept: ADMINISTRATIVE | Facility: HOSPITAL | Age: 41
End: 2021-05-04

## 2021-05-04 ENCOUNTER — LAB (OUTPATIENT)
Dept: LAB | Facility: HOSPITAL | Age: 41
End: 2021-05-04

## 2021-05-04 DIAGNOSIS — E78.2 MIXED HYPERLIPIDEMIA: ICD-10-CM

## 2021-05-04 DIAGNOSIS — E55.9 VITAMIN D DEFICIENCY: ICD-10-CM

## 2021-05-04 DIAGNOSIS — E11.9 TYPE 2 DIABETES MELLITUS WITHOUT COMPLICATION, UNSPECIFIED WHETHER LONG TERM INSULIN USE (HCC): Primary | ICD-10-CM

## 2021-05-04 DIAGNOSIS — E11.9 TYPE 2 DIABETES MELLITUS WITHOUT COMPLICATION, UNSPECIFIED WHETHER LONG TERM INSULIN USE (HCC): ICD-10-CM

## 2021-05-04 LAB
25(OH)D3 SERPL-MCNC: 29.3 NG/ML
ALBUMIN SERPL-MCNC: 4.6 G/DL
ALBUMIN SERPL-MCNC: 4.6 G/DL (ref 3.5–5)
ALBUMIN UR-MCNC: 30.9 MG/DL
ALBUMIN/GLOB SERPL: 1.5 G/DL (ref 1.1–2.5)
ALP BLD-CCNC: 65 U/L
ALP SERPL-CCNC: 65 U/L (ref 24–120)
ALT SERPL W P-5'-P-CCNC: 34 U/L (ref 0–35)
ALT SERPL-CCNC: 34 U/L
ANION GAP SERPL CALCULATED.3IONS-SCNC: 13 MMOL/L
ANION GAP SERPL CALCULATED.3IONS-SCNC: 13 MMOL/L (ref 4–13)
AST SERPL-CCNC: 33 U/L
AST SERPL-CCNC: 33 U/L (ref 7–45)
AUTO MIXED CELLS #: 0.6 10*3/MM3 (ref 0.1–2.6)
AUTO MIXED CELLS %: 7.3 % (ref 0.1–24)
AVERAGE GLUCOSE: NORMAL
BACTERIA UR QL AUTO: ABNORMAL /HPF
BILIRUB SERPL-MCNC: 0.7 MG/DL (ref 0.1–1)
BILIRUB SERPL-MCNC: 0.7 MG/DL (ref 0.1–1.4)
BILIRUB UR QL STRIP: ABNORMAL
BUN BLDV-MCNC: 11 MG/DL
BUN SERPL-MCNC: 11 MG/DL (ref 5–21)
BUN/CREAT SERPL: 16.7
CALCIUM SERPL-MCNC: 9.3 MG/DL
CALCIUM SPEC-SCNC: 9.3 MG/DL (ref 8.4–10.4)
CHLORIDE BLD-SCNC: 99 MMOL/L
CHLORIDE SERPL-SCNC: 99 MMOL/L (ref 98–110)
CLARITY UR: CLEAR
CO2 SERPL-SCNC: 27 MMOL/L (ref 24–31)
CO2: 27 MMOL/L
COLOR UR: YELLOW
CREAT SERPL-MCNC: 0.66 MG/DL
CREAT SERPL-MCNC: 0.66 MG/DL (ref 0.5–1.4)
CREAT UR-MCNC: 342 MG/DL
CREATININE, URINE: 342
ERYTHROCYTE [DISTWIDTH] IN BLOOD BY AUTOMATED COUNT: 12.1 % (ref 12.3–15.4)
GFR CALCULATED: 99
GFR SERPL CREATININE-BSD FRML MDRD: 99 ML/MIN/1.73
GLOBULIN UR ELPH-MCNC: 3.1 GM/DL
GLUCOSE BLD-MCNC: 185 MG/DL
GLUCOSE SERPL-MCNC: 185 MG/DL (ref 70–100)
GLUCOSE UR STRIP-MCNC: NEGATIVE MG/DL
HBA1C MFR BLD: 8.3 %
HBA1C MFR BLD: 8.3 % (ref 4.8–5.9)
HCT VFR BLD AUTO: 39.7 % (ref 34–46.6)
HGB BLD-MCNC: 13.6 G/DL (ref 12–15.9)
HGB UR QL STRIP.AUTO: ABNORMAL
HYALINE CASTS UR QL AUTO: ABNORMAL /LPF
KETONES UR QL STRIP: ABNORMAL
LEUKOCYTE ESTERASE UR QL STRIP.AUTO: NEGATIVE
LYMPHOCYTES # BLD AUTO: 2.8 10*3/MM3 (ref 0.7–3.1)
LYMPHOCYTES NFR BLD AUTO: 32.9 % (ref 19.6–45.3)
MCH RBC QN AUTO: 28.6 PG (ref 26.6–33)
MCHC RBC AUTO-ENTMCNC: 34.3 G/DL (ref 31.5–35.7)
MCV RBC AUTO: 83.6 FL (ref 79–97)
MICROALBUMIN/CREAT 24H UR: 30.9 MG/G{CREAT}
MICROALBUMIN/CREAT UR-RTO: 90.4
MICROALBUMIN/CREAT UR: 90.4 MG/G
MUCOUS THREADS URNS QL MICRO: ABNORMAL /HPF
NEUTROPHILS NFR BLD AUTO: 5 10*3/MM3 (ref 1.7–7)
NEUTROPHILS NFR BLD AUTO: 59.8 % (ref 42.7–76)
NITRITE UR QL STRIP: NEGATIVE
PH UR STRIP.AUTO: 6 [PH] (ref 5–8)
PLATELET # BLD AUTO: 260 10*3/MM3 (ref 140–450)
PMV BLD AUTO: 10.6 FL (ref 6–12)
POTASSIUM SERPL-SCNC: 4.3 MMOL/L
POTASSIUM SERPL-SCNC: 4.3 MMOL/L (ref 3.5–5.3)
PROT SERPL-MCNC: 7.7 G/DL (ref 6.3–8.7)
PROT UR QL STRIP: ABNORMAL
RBC # BLD AUTO: 4.75 10*6/MM3 (ref 3.77–5.28)
RBC # UR: ABNORMAL /HPF
REF LAB TEST METHOD: ABNORMAL
SODIUM BLD-SCNC: 139 MMOL/L
SODIUM SERPL-SCNC: 139 MMOL/L (ref 135–145)
SP GR UR STRIP: 1.02 (ref 1–1.03)
SQUAMOUS #/AREA URNS HPF: ABNORMAL /HPF
TOTAL PROTEIN: 7.7
TSH SERPL DL<=0.05 MIU/L-ACNC: 2.13 UIU/ML
TSH SERPL DL<=0.05 MIU/L-ACNC: 2.13 UIU/ML (ref 0.27–4.2)
UROBILINOGEN UR QL STRIP: ABNORMAL
VITAMIN D 25-HYDROXY: 29.3
VITAMIN D2, 25 HYDROXY: NORMAL
VITAMIN D3,25 HYDROXY: NORMAL
WBC # BLD AUTO: 8.4 10*3/MM3 (ref 3.4–10.8)
WBC UR QL AUTO: ABNORMAL /HPF

## 2021-05-04 PROCEDURE — 82570 ASSAY OF URINE CREATININE: CPT

## 2021-05-04 PROCEDURE — 80053 COMPREHEN METABOLIC PANEL: CPT | Performed by: INTERNAL MEDICINE

## 2021-05-04 PROCEDURE — 82043 UR ALBUMIN QUANTITATIVE: CPT

## 2021-05-04 PROCEDURE — 36415 COLL VENOUS BLD VENIPUNCTURE: CPT | Performed by: INTERNAL MEDICINE

## 2021-05-04 PROCEDURE — 84443 ASSAY THYROID STIM HORMONE: CPT

## 2021-05-04 PROCEDURE — 83036 HEMOGLOBIN GLYCOSYLATED A1C: CPT | Performed by: INTERNAL MEDICINE

## 2021-05-04 PROCEDURE — 85025 COMPLETE CBC W/AUTO DIFF WBC: CPT

## 2021-05-04 PROCEDURE — 81001 URINALYSIS AUTO W/SCOPE: CPT | Performed by: INTERNAL MEDICINE

## 2021-05-04 PROCEDURE — 82306 VITAMIN D 25 HYDROXY: CPT

## 2021-05-04 PROCEDURE — 87086 URINE CULTURE/COLONY COUNT: CPT | Performed by: INTERNAL MEDICINE

## 2021-05-05 LAB — BACTERIA SPEC AEROBE CULT: NORMAL

## 2021-05-06 ENCOUNTER — OFFICE VISIT (OUTPATIENT)
Dept: ENDOCRINOLOGY | Facility: CLINIC | Age: 41
End: 2021-05-06

## 2021-05-06 VITALS
OXYGEN SATURATION: 99 % | HEART RATE: 101 BPM | BODY MASS INDEX: 42.91 KG/M2 | SYSTOLIC BLOOD PRESSURE: 138 MMHG | DIASTOLIC BLOOD PRESSURE: 90 MMHG | HEIGHT: 63 IN | WEIGHT: 242.2 LBS

## 2021-05-06 DIAGNOSIS — I10 ESSENTIAL HYPERTENSION: Chronic | ICD-10-CM

## 2021-05-06 DIAGNOSIS — Z79.4 TYPE 2 DIABETES MELLITUS WITH HYPERGLYCEMIA, WITH LONG-TERM CURRENT USE OF INSULIN (HCC): Primary | ICD-10-CM

## 2021-05-06 DIAGNOSIS — E78.2 MIXED HYPERLIPIDEMIA: Chronic | ICD-10-CM

## 2021-05-06 DIAGNOSIS — E11.65 TYPE 2 DIABETES MELLITUS WITH HYPERGLYCEMIA, WITH LONG-TERM CURRENT USE OF INSULIN (HCC): Primary | ICD-10-CM

## 2021-05-06 PROCEDURE — 95250 CONT GLUC MNTR PHYS/QHP EQP: CPT | Performed by: NURSE PRACTITIONER

## 2021-05-06 PROCEDURE — 99214 OFFICE O/P EST MOD 30 MIN: CPT | Performed by: NURSE PRACTITIONER

## 2021-05-06 RX ORDER — INSULIN ASPART 100 [IU]/ML
12 INJECTION, SOLUTION INTRAVENOUS; SUBCUTANEOUS
Qty: 15 ML | Refills: 11 | Status: SHIPPED | OUTPATIENT
Start: 2021-05-06 | End: 2021-08-23 | Stop reason: ALTCHOICE

## 2021-05-06 NOTE — PATIENT INSTRUCTIONS
Basaglar 50 units       If you wake up less than 80 decrease to 45 units     Ozempic 1 mg once weekly       Start Humalog     Takes 3 times before each meal --start with 4 units may increase to 6 units     + sliding scale     151-200 -- 1 units   201 -250    2 units   251-300     3 units   Above 301  4 units

## 2021-05-06 NOTE — PROGRESS NOTES
"Chief Complaint  Diabetes    Subjective          Harry Panda presents to [facility] ENDOCRINOLOGY  History of Present Illness     In office visit    Referring provider Gayatri Bailon MD    40-year-old female presents for consultation    Reason diabetes mellitus type 2    History of PCOS     Timing constant    Duration-- 2016     Quality not controlled     Severity moderate     Lab Results   Component Value Date    HGBA1C 8.3 (H) 05/04/2021         Macrovascular complications--- had MI last year from blood clot     Microvascular complications--- no neuropathy , no DR , no renal disease     Current diabetes regimen-- insulin , GLP- 1     Current glucose monitoring device    Fingerstick     Check 4 times daily    Am readings are above 130         Current diet--- low carb     Current exercise regimen-- walking on treadmill       Review of Systems - General ROS: negative            Objective   Vital Signs:   /90   Pulse 101   Ht 160 cm (63\")   Wt 110 kg (242 lb 3.2 oz)   SpO2 99%   BMI 42.90 kg/m²     Physical Exam  Constitutional:       Appearance: Normal appearance.   HENT:      Head: Normocephalic.      Nose: Nose normal.   Cardiovascular:      Rate and Rhythm: Regular rhythm.      Heart sounds: Normal heart sounds.   Pulmonary:      Breath sounds: Normal breath sounds.   Musculoskeletal:         General: Normal range of motion.      Cervical back: Normal range of motion.   Skin:     Coloration: Skin is not pale.   Neurological:      General: No focal deficit present.      Mental Status: She is alert.   Psychiatric:         Mood and Affect: Mood normal.         Thought Content: Thought content normal.         Judgment: Judgment normal.        Result Review :   The following data was reviewed by: MARKUS Gomez on 05/06/2021:  Common labs    Common Labsle 9/4/20 9/4/20 9/4/20 9/4/20 1/14/21 1/14/21 1/14/21 5/4/21 5/4/21 5/4/21 5/4/21    0247 0247 0247 0247 1006 1006 1006 1026 " 1026 1026 1026   Glucose   116 (A)   151 (A)    185 (A)    BUN   9   10    11    Creatinine   0.50 (A)   0.56    0.66    eGFR Non African Am   137   120    99    Sodium   140   141    139    Potassium   3.6   4.0    4.3    Chloride   101   104    99    Calcium   8.7   9.4    9.3    Albumin      4.50    4.60    Total Bilirubin      0.7    0.7    Alkaline Phosphatase      62    65    AST (SGOT)      32    33    ALT (SGPT)      28    34    WBC 8.38       8.40      Hemoglobin 12.0       13.6      Hematocrit 35.1       39.7      Platelets 231       260      Total Cholesterol    130   123 (A)       Triglycerides    132   154 (A)       HDL Cholesterol    41   39 (A)       LDL Cholesterol     63   58       Hemoglobin A1C  7.70 (A)   7.8 (A)    8.3 (A)     Microalbumin, Urine           30.9   (A) Abnormal value                      Assessment and Plan    Diagnoses and all orders for this visit:    1. Type 2 diabetes mellitus with hyperglycemia, with long-term current use of insulin (CMS/[facility]) (Primary)    2. Mixed hyperlipidemia    3. Essential hypertension    Other orders  -     insulin aspart (NovoLOG FlexPen) 100 UNIT/ML solution pen-injector sc pen; Inject 12 Units under the skin into the appropriate area as directed 3 (Three) Times a Day With Meals.  Dispense: 3 pen; Refill: 11  -     Continuous Blood Gluc Sensor (FreeStyle Maranda 2 Sensor) misc; 1 each Every 14 (Fourteen) Days. Use every 14 days  Dispense: 2 each; Refill: 11  -     Insulin Pen Needle (Pen Needles) 32G X 4 MM misc; 1 each 4 (Four) Times a Day. Use 4 x daily, Dx code E11.65  Dispense: 120 each; Refill: 11             Glycemic management    Type 2 diabetes    Lab Results   Component Value Date    HGBA1C 8.3 (H) 05/04/2021         Basaglar 50 units taking     If you wake up less than 80 decrease to 45 units     Ozempic 1 mg once weekly taking       Start Humalog     Takes 3 times before each meal --start with 4 units may increase to 6 units     + sliding  scale     151-200 -- 1 units   201 -250    2 units   251-300     3 units   Above 301  4 units           Metformin -- could not tolerate       We started patient on a sukhjinder 2 in office, we started the sensor, demonstrated how to insert and change sensor every 14 days, we discussed what the arrows mean     Approve for sukhjinder 2 sensors       The patient has diabetes mellitus, insulin-dependent.     Our Diabetes Department has evaluated the patient in the last six months and will continue counseling on insulin adjustment.      The patient performs blood glucose testing at least four times daily with proven glucose variability from 50 to 300 mg per dl.     The patient is administering basal insulin and prandial insulin four times per day for more than six months.     The patient uses a home blood glucose monitor to assess blood glucose at least four times daily for more than six months.     The patient requires frequent adjustment of insulin treatment regimen based on blood glucose readings.     The patient has frequent variability in blood glucose readings due to activity and variability in meal content and time.      The patient has completed a diabetes education program with us.     The patient has demonstrated the ability to self-monitor her glucose.      The patient is motivated in improving diabetes control      The patient has hypoglycemia unawareness               Microvascular complication surveillance    Last eye exam--- Feb. 2021     Neuropathy--- none           Lipid management      Taking crestor 40 mg one at night       Total Cholesterol   Date Value Ref Range Status   01/14/2021 123 (L) 130 - 200 mg/dL Final     Triglycerides   Date Value Ref Range Status   01/14/2021 154 (H) 0 - 149 mg/dL Final     HDL Cholesterol   Date Value Ref Range Status   01/14/2021 39 (L) >=50 mg/dL Final     LDL Cholesterol    Date Value Ref Range Status   01/14/2021 58 0 - 99 mg/dL Final       Blood pressure  management      Taking Benicar HCT 40-12.5 mg daily     Taking  Bystolic 10 mg daily         Thyroid health    Lab Results   Component Value Date    TSH 2.130 05/04/2021           Other related diabetes aspects      No results found for: [id number]      Weight management    Patient's (Body mass index is 42.9 kg/m².) indicates that they are morbidly obese (BMI > 40 or > 35 with obesity - related health condition) with obesity-related health conditions that include diabetes mellitus . Obesity is unchanged. BMI is is above average; no BMI management plan is appropriate. We discussed portion control, increasing exercise and consulting a Bariatric surgeon.       preventative care    Non smoker       Records from Dr. Aquino received and reviewed from 2021  Thank your for this consultation       Follow Up   Return in about 4 weeks (around 6/3/2021) for Recheck, Video visit.  Patient was given instructions and counseling regarding her condition or for health maintenance advice. Please see specific information pulled into the AVS if appropriate.

## 2021-05-13 RX ORDER — INSULIN LISPRO 100 [IU]/ML
20 INJECTION, SOLUTION INTRAVENOUS; SUBCUTANEOUS
Qty: 18 ML | Refills: 11 | Status: SHIPPED | OUTPATIENT
Start: 2021-05-13 | End: 2021-08-04

## 2021-05-14 ENCOUNTER — OFFICE VISIT (OUTPATIENT)
Dept: INTERNAL MEDICINE | Age: 41
End: 2021-05-14
Payer: COMMERCIAL

## 2021-05-14 VITALS
WEIGHT: 244 LBS | RESPIRATION RATE: 18 BRPM | HEART RATE: 96 BPM | DIASTOLIC BLOOD PRESSURE: 78 MMHG | OXYGEN SATURATION: 96 % | HEIGHT: 63 IN | BODY MASS INDEX: 43.23 KG/M2 | SYSTOLIC BLOOD PRESSURE: 108 MMHG

## 2021-05-14 DIAGNOSIS — K76.0 FATTY LIVER: ICD-10-CM

## 2021-05-14 DIAGNOSIS — E55.9 VITAMIN D DEFICIENCY: ICD-10-CM

## 2021-05-14 DIAGNOSIS — Z00.00 ANNUAL PHYSICAL EXAM: Primary | ICD-10-CM

## 2021-05-14 DIAGNOSIS — E78.2 MIXED HYPERLIPIDEMIA: ICD-10-CM

## 2021-05-14 DIAGNOSIS — Z11.59 NEED FOR HEPATITIS C SCREENING TEST: ICD-10-CM

## 2021-05-14 DIAGNOSIS — I10 ESSENTIAL HYPERTENSION: ICD-10-CM

## 2021-05-14 DIAGNOSIS — I25.10 CORONARY ARTERY DISEASE INVOLVING NATIVE CORONARY ARTERY OF NATIVE HEART WITHOUT ANGINA PECTORIS: ICD-10-CM

## 2021-05-14 DIAGNOSIS — E11.9 TYPE 2 DIABETES MELLITUS WITHOUT COMPLICATION, WITHOUT LONG-TERM CURRENT USE OF INSULIN (HCC): ICD-10-CM

## 2021-05-14 DIAGNOSIS — E66.01 MORBID OBESITY DUE TO EXCESS CALORIES (HCC): ICD-10-CM

## 2021-05-14 PROCEDURE — 99396 PREV VISIT EST AGE 40-64: CPT | Performed by: INTERNAL MEDICINE

## 2021-05-14 ASSESSMENT — ENCOUNTER SYMPTOMS
CHEST TIGHTNESS: 0
ABDOMINAL PAIN: 0
WHEEZING: 0
CONSTIPATION: 0
COUGH: 0
SORE THROAT: 0

## 2021-05-14 NOTE — PROGRESS NOTES
Chief Complaint   Patient presents with    Annual Exam     History of presenting illness:  Scott Freitas is a43 y.o. female who presents today for follow up on her chronic medical conditions as noted below. Type 2 diabetes mellitus      CAD  History of acute anterior wall MI 9/3/20  Status post insertion of drug-eluting stent into left anterior descending (LAD) artery  Under care of cardiology  Doing well     HTN- bp has been controlled    Patient Active Problem List    Diagnosis Date Noted    Coronary artery disease involving native coronary artery without angina pectoris 10/22/2020     Overview Note:     History of acute anterior wall MI 9/3/20  Status post insertion of drug-eluting stent into left anterior descending (LAD) artery      Medication intolerance 01/17/2019     Overview Note:     Intolerance to metformin      Generalized anxiety disorder 09/19/2017    Essential hypertension 09/18/2017    Type 2 diabetes mellitus without complication, without long-term current use of insulin ([facility] [address] 09/18/2017    Fatty liver 09/18/2017    Morbid obesity due to excess calories ([facility] [address] 09/18/2017    Mixed hyperlipidemia 09/18/2017    Chronic midline low back pain without sciatica 09/18/2017     No past medical history on file.    Past Surgical History:   Procedure Laterality Date    CHOLECYSTECTOMY       Current Outpatient Medications   Medication Sig Dispense Refill    insulin glargine (BASAGLAR KWIKPEN) 100 UNIT/ML injection pen Inject 50 Units into the skin nightly 15 mL 0    rosuvastatin (CRESTOR) 40 MG tablet Take 1 tablet by mouth daily 90 tablet 1    olmesartan-hydroCHLOROthiazide (BENICAR HCT) 40-12.5 MG per tablet Take 1 tablet by mouth daily 90 tablet 1    nebivolol (BYSTOLIC) 10 MG tablet Take 1 tablet by mouth every morning 90 tablet 1    Insulin Pen Needle 32G X 4 MM MISC 1 each by Does not apply route daily 100 each 3    FLUoxetine (PROZAC) 20 MG capsule Take 1 capsule by mouth daily 90 capsule 1    Semaglutide, 1 MG/DOSE, (OZEMPIC, 1 MG/DOSE,) 2 MG/1.5ML SOPN Inject 1 mg into the skin once a week 6 pen 1    aspirin 81 MG EC tablet Take 81 mg by mouth daily      vitamin D 25 MCG (1000 UT) CAPS Take by mouth      coenzyme Q10 100 MG CAPS capsule Take 100 mg by mouth daily      nitroGLYCERIN (NITROSTAT) 0.4 MG SL tablet Place 0.4 mg under the tongue every 5 minutes as needed for Chest pain up to max of 3 total doses. If no relief after 1 dose, call 911.  pantoprazole sodium (PROTONIX) 40 MG PACK packet Take 40 mg by mouth every morning (before breakfast)      ticagrelor (BRILINTA) 90 MG TABS tablet Take 90 mg by mouth 2 times daily       No current facility-administered medications for this visit. Allergies   Allergen Reactions    Penicillins      unknown     Social History     Tobacco Use    Smoking status: Former Smoker     Packs/day: 0.50     Years: 7.00     Pack years: 3.50     Types: Cigarettes     Quit date: 2006     Years since quitting: 15.3    Smokeless tobacco: Never Used   Substance Use Topics    Alcohol use: No      Family History   Problem Relation Age of Onset    Diabetes Mother     High Blood Pressure Mother     Colon Cancer Mother 61    Diabetes Father        Review of Systems   Constitutional: Positive for fatigue. Negative for chills and fever. HENT: Negative for congestion, ear pain, nosebleeds, postnasal drip and sore throat. Respiratory: Negative for cough, chest tightness and wheezing. Cardiovascular: Negative for chest pain, palpitations and leg swelling. Gastrointestinal: Negative for abdominal pain and constipation. Genitourinary: Negative for dysuria and urgency. Musculoskeletal: Negative. Negative for arthralgias. Skin: Negative for rash. Neurological: Negative for dizziness and headaches. Psychiatric/Behavioral: Negative.       Vitals:    05/14/21 1339   BP: 108/78   Site: Left Upper Arm   Position: Sitting   Cuff Size: Large Adult   Pulse: 96   Resp: 18   SpO2: 96%   Weight: 244 lb (110.7 kg)   Height: 5' 3\" (1.6 m)     Body mass index is 43.22 kg/m². Physical Exam  Constitutional:       Appearance: She is well-developed. HENT:      Right Ear: External ear normal.      Left Ear: External ear normal.      Mouth/Throat:      Pharynx: No oropharyngeal exudate. Eyes:      Conjunctiva/sclera: Conjunctivae normal.      Pupils: Pupils are equal, round, and reactive to light. Neck:      Musculoskeletal: Neck supple. Thyroid: No thyromegaly. Vascular: No JVD. Cardiovascular:      Rate and Rhythm: Normal rate. Heart sounds: Normal heart sounds. No murmur. Pulmonary:      Effort: No respiratory distress. Breath sounds: Normal breath sounds. No wheezing or rales. Chest:      Chest wall: No tenderness. Abdominal:      General: Bowel sounds are normal.      Palpations: Abdomen is soft. Musculoskeletal: Normal range of motion. Lymphadenopathy:      Cervical: No cervical adenopathy. Skin:     General: Skin is warm. Findings: No rash. Neurological:      Mental Status: She is oriented to person, place, and time.          Lab Review   Orders Only on 05/05/2021   Component Date Value    TSH 05/04/2021 2.130     Vit D, 25-Hydroxy 05/04/2021 29.3     Microalbumin Creatinine * 05/04/2021 90.4     Microalb, Ur 05/04/2021 30.9     Creatinine, Urine 05/04/2021 342.0     Hemoglobin A1C 05/04/2021 8.3     Sodium 05/04/2021 139     Chloride 05/04/2021 99     Potassium 05/04/2021 4.3     BUN 05/04/2021 11     CREATININE 05/04/2021 0.66     Glucose 05/04/2021 185     AST 05/04/2021 33     ALT 05/04/2021 34     Calcium 05/04/2021 9.3     Total Protein 05/04/2021 7.7     CO2 05/04/2021 27.0     Albumin 05/04/2021 4.60     Alkaline Phosphatase 05/04/2021 65     Total Bilirubin 05/04/2021 0.7     Gfr Calculated 05/04/2021 99     Anion Gap 05/04/2021 13.0            ASSESSMENT/PLAN:    Annual physical Metabolic Panel    Lipid Panel    Vitamin D 25 Hydroxy    TSH without Reflex     New Prescriptions    No medications on file         No follow-ups on file. There are no Patient Instructions on file for this visit. EMR Dragon/transcription disclaimer:Significant part of this  encounter note is electronic transcription/translationof spoken language to printed text. The electronic translation of spoken language may be erroneous, or at times, nonsensical words or phrases may be inadvertently transcribed.  Although I have reviewed the note for sucherrors, some may still exist.

## 2021-05-21 DIAGNOSIS — E11.65 TYPE 2 DIABETES MELLITUS WITH HYPERGLYCEMIA, WITH LONG-TERM CURRENT USE OF INSULIN (HCC): Primary | ICD-10-CM

## 2021-05-21 DIAGNOSIS — Z79.4 TYPE 2 DIABETES MELLITUS WITH HYPERGLYCEMIA, WITH LONG-TERM CURRENT USE OF INSULIN (HCC): Primary | ICD-10-CM

## 2021-05-21 RX ORDER — PROCHLORPERAZINE 25 MG/1
1 SUPPOSITORY RECTAL ONCE
Qty: 1 EACH | Refills: 3 | Status: CANCELLED | OUTPATIENT
Start: 2021-05-21 | End: 2021-05-21

## 2021-05-21 RX ORDER — PROCHLORPERAZINE 25 MG/1
1 SUPPOSITORY RECTAL AS NEEDED
Qty: 9 EACH | Refills: 3 | Status: SHIPPED | OUTPATIENT
Start: 2021-05-21

## 2021-05-21 RX ORDER — PROCHLORPERAZINE 25 MG/1
1 SUPPOSITORY RECTAL
Qty: 1 EACH | Refills: 3 | Status: SHIPPED | OUTPATIENT
Start: 2021-05-21

## 2021-05-21 RX ORDER — PROCHLORPERAZINE 25 MG/1
SUPPOSITORY RECTAL AS NEEDED
Qty: 9 EACH | Refills: 3 | Status: CANCELLED | OUTPATIENT
Start: 2021-05-21

## 2021-05-21 RX ORDER — PROCHLORPERAZINE 25 MG/1
1 SUPPOSITORY RECTAL ONCE
Qty: 1 EACH | Refills: 0 | Status: CANCELLED | OUTPATIENT
Start: 2021-05-21 | End: 2021-05-21

## 2021-05-21 RX ORDER — PROCHLORPERAZINE 25 MG/1
1 SUPPOSITORY RECTAL ONCE
Qty: 1 EACH | Refills: 1 | Status: SHIPPED | OUTPATIENT
Start: 2021-05-21 | End: 2021-05-29

## 2021-05-26 ENCOUNTER — TELEPHONE (OUTPATIENT)
Dept: FAMILY MEDICINE CLINIC | Facility: CLINIC | Age: 41
End: 2021-05-26

## 2021-05-27 RX ORDER — INSULIN GLARGINE 100 [IU]/ML
50 INJECTION, SOLUTION SUBCUTANEOUS NIGHTLY
Qty: 5 PEN | Refills: 11 | Status: SHIPPED | OUTPATIENT
Start: 2021-05-27 | End: 2022-02-07

## 2021-06-01 ENCOUNTER — TELEMEDICINE (OUTPATIENT)
Dept: ENDOCRINOLOGY | Facility: CLINIC | Age: 41
End: 2021-06-01

## 2021-06-01 DIAGNOSIS — E78.2 MIXED HYPERLIPIDEMIA: ICD-10-CM

## 2021-06-01 DIAGNOSIS — E11.649 TYPE 2 DIABETES MELLITUS WITH HYPOGLYCEMIA WITHOUT COMA, WITH LONG-TERM CURRENT USE OF INSULIN (HCC): Primary | ICD-10-CM

## 2021-06-01 DIAGNOSIS — I10 ESSENTIAL HYPERTENSION: ICD-10-CM

## 2021-06-01 DIAGNOSIS — Z79.4 TYPE 2 DIABETES MELLITUS WITH HYPOGLYCEMIA WITHOUT COMA, WITH LONG-TERM CURRENT USE OF INSULIN (HCC): Primary | ICD-10-CM

## 2021-06-01 PROCEDURE — 99214 OFFICE O/P EST MOD 30 MIN: CPT | Performed by: NURSE PRACTITIONER

## 2021-06-01 NOTE — PROGRESS NOTES
Chief Complaint  Diabetes    Subjective          Harry Panda presents to [facility] ENDOCRINOLOGY  History of Present Illness       You have chosen to receive care through a telehealth visit.  Do you consent to use a video/audio connection for your medical care today? Yes        TELEHEALTH VIDEO VISIT     This a video visit due to [facility] current guidelines for social distancing due to the COVID 19 pandemic     Referring provider Gayatri Bailon MD     40-year-old female presents for follow-up    Reason diabetes mellitus type 2    Also has a history of PCOS    Duration diagnosed in 2016    Quality not controlled    Timing is constant    Severity is moderate             Lab Results   Component Value Date     HGBA1C 8.3 (H) 05/04/2021            Macrovascular complications--- had MI last year from blood clot      Microvascular complications--- no neuropathy , no DR , no renal disease      Current diabetes regimen-- insulin , GLP- 1      Current glucose monitoring device     Now has the dexcom G 6        Could not download    She is having low blood sugars in the middle of the night early morning          Current diet--- low carb      Current exercise regimen-- walking on treadmill         Review of Systems - General ROS: negative                  Objective   Vital Signs:   There were no vitals taken for this visit.    Physical Exam  Musculoskeletal:      Cervical back: Normal range of motion.   Skin:     Coloration: Skin is not pale.   Neurological:      General: No focal deficit present.      Mental Status: She is alert.   Psychiatric:         Mood and Affect: Mood normal.         Thought Content: Thought content normal.         Judgment: Judgment normal.        Result Review :   The following data was reviewed by: MARKUS Gomez on 06/01/2021:  Common labs    Common Labsle 9/4/20 9/4/20 9/4/20 9/4/20 1/14/21 1/14/21 1/14/21 5/4/21 5/4/21 5/4/21 5/4/21    0247 0247 0247 0247 1006 1006 1006  1026 1026 1026 1026   Glucose   116 (A)   151 (A)    185 (A)    BUN   9   10    11    Creatinine   0.50 (A)   0.56    0.66    eGFR Non African Am   137   120    99    Sodium   140   141    139    Potassium   3.6   4.0    4.3    Chloride   101   104    99    Calcium   8.7   9.4    9.3    Albumin      4.50    4.60    Total Bilirubin      0.7    0.7    Alkaline Phosphatase      62    65    AST (SGOT)      32    33    ALT (SGPT)      28    34    WBC 8.38       8.40      Hemoglobin 12.0       13.6      Hematocrit 35.1       39.7      Platelets 231       260      Total Cholesterol    130   123 (A)       Triglycerides    132   154 (A)       HDL Cholesterol    41   39 (A)       LDL Cholesterol     63   58       Hemoglobin A1C  7.70 (A)   7.8 (A)    8.3 (A)     Microalbumin, Urine           30.9   (A) Abnormal value                      Assessment and Plan    Diagnoses and all orders for this visit:    1. Type 2 diabetes mellitus with hypoglycemia without coma, with long-term current use of insulin (CMS/[facility]) (Primary)    2. Essential hypertension    3. Mixed hyperlipidemia           Glycemic management     Type 2 diabetes           Lab Results   Component Value Date     HGBA1C 8.3 (H) 05/04/2021            Basaglar 50 units taking ----decrease to 45 units      If you wake up less than 80 decrease to 40 units       Ozempic 1 mg once weekly taking          Humalog      Takes  4 units up to  6 units TID before meals    + sliding scale      151-200 -- 1 units   201 -250    2 units   251-300     3 units   Above 301  4 units               Metformin -- could not tolerate                                    Microvascular complication surveillance     Last eye exam--- Feb. 2021      Neuropathy--- none               Lipid management        Taking crestor 40 mg one at night         Total Cholesterol   Date Value Ref Range Status   01/14/2021 123 (L) 130 - 200 mg/dL Final     Triglycerides   Date Value Ref Range Status   01/14/2021 154  (H) 0 - 149 mg/dL Final     HDL Cholesterol   Date Value Ref Range Status   01/14/2021 39 (L) >=50 mg/dL Final     LDL Cholesterol    Date Value Ref Range Status   01/14/2021 58 0 - 99 mg/dL Final        Blood pressure management        Taking Benicar HCT 40-12.5 mg daily      Taking  Bystolic 10 mg daily            Thyroid health     Lab Results   Component Value Date    TSH 2.130 05/04/2021             Other related diabetes aspects     No results found for: [id number]          Weight management           preventative care     Non smoker               Follow Up   Return in about 3 months (around 9/1/2021) for Recheck.  Patient was given instructions and counseling regarding her condition or for health maintenance advice. Please see specific information pulled into the AVS if appropriate.         I provided advice regarding diabetes management, dietary changes, adjustments of medication, self titration of insulin, refilled medications, ordering labs, future appointments    Patient was advised to contact the office if there are unanswered questions, trouble with blood glucose management, or any concerns

## 2021-07-08 RX ORDER — NEBIVOLOL 10 MG/1
TABLET ORAL
Qty: 30 TABLET | Refills: 5 | Status: SHIPPED | OUTPATIENT
Start: 2021-07-08 | End: 2022-01-10 | Stop reason: SDUPTHER

## 2021-07-08 NOTE — TELEPHONE ENCOUNTER
Pierce Baker called requesting a refill of the below medication which has been pended for you:     Requested Prescriptions     Pending Prescriptions Disp Refills    nebivolol (BYSTOLIC) 10 MG tablet [Pharmacy Med Name: Nebivolol HCl 10 MG Oral Tablet (BYSTOLIC)] 30 tablet 5     Sig: Take 1 tablet by mouth every morning       Last Appointment Date: 5/14/2021  Next Appointment Date: 10/15/2021    Allergies   Allergen Reactions    Penicillins      unknown

## 2021-07-13 ENCOUNTER — TELEPHONE (OUTPATIENT)
Dept: BARIATRICS/WEIGHT MGMT | Facility: CLINIC | Age: 41
End: 2021-07-13

## 2021-07-14 ENCOUNTER — OFFICE VISIT (OUTPATIENT)
Dept: BARIATRICS/WEIGHT MGMT | Facility: CLINIC | Age: 41
End: 2021-07-14

## 2021-07-14 VITALS
TEMPERATURE: 98.6 F | WEIGHT: 248 LBS | HEIGHT: 64 IN | HEART RATE: 79 BPM | OXYGEN SATURATION: 97 % | BODY MASS INDEX: 42.34 KG/M2 | DIASTOLIC BLOOD PRESSURE: 85 MMHG | SYSTOLIC BLOOD PRESSURE: 139 MMHG

## 2021-07-14 DIAGNOSIS — K76.0 FATTY LIVER: ICD-10-CM

## 2021-07-14 DIAGNOSIS — E11.65 TYPE 2 DIABETES MELLITUS WITH HYPERGLYCEMIA, WITH LONG-TERM CURRENT USE OF INSULIN (HCC): ICD-10-CM

## 2021-07-14 DIAGNOSIS — Z79.4 TYPE 2 DIABETES MELLITUS WITH HYPERGLYCEMIA, WITH LONG-TERM CURRENT USE OF INSULIN (HCC): ICD-10-CM

## 2021-07-14 DIAGNOSIS — I10 ESSENTIAL HYPERTENSION: Chronic | ICD-10-CM

## 2021-07-14 DIAGNOSIS — E66.01 CLASS 3 SEVERE OBESITY DUE TO EXCESS CALORIES WITH SERIOUS COMORBIDITY AND BODY MASS INDEX (BMI) OF 40.0 TO 44.9 IN ADULT (HCC): Primary | Chronic | ICD-10-CM

## 2021-07-14 PROCEDURE — 99213 OFFICE O/P EST LOW 20 MIN: CPT | Performed by: SURGERY

## 2021-07-14 NOTE — PROGRESS NOTES
Patient Care Team:  Gayatri Aquino MD as PCP - General  Gayatri Aquino MD as PCP - Family Medicine    Reason for Visit:  Surgical Weight loss    Subjective      Harry Panda is a pleasant 40 y.o. female and presents with morbid obesity with her Body mass index is 42.9 kg/m²..    She is here for discussion of weight loss options.  She stated she has been with the disease of obesity for year(s).  She stated she suffers from hypertension, diabetes, fatty liver disease and morbid obesity due to her weight gain.  She stated that weight loss helps alleviate these symptoms.   She stated that she has tried multiple diet regimens including participating in a medically supervised weight loss program to help with weight loss.  She stated that she has attempted these conservative methods for weight loss without maintaining long term success.  Today she would like to discuss surgical weight loss options such as the Laparoscopic Sleeve Gastrectomy or the Laparoscopic R - Y Gastric Bypass.      Review of Systems  General ROS: positive for  - fatigue  Psychological ROS: positive for - anxiety and depression  Respiratory ROS: no cough, shortness of breath, or wheezing  Cardiovascular ROS: no chest pain or dyspnea on exertion  Gastrointestinal ROS: no abdominal pain, change in bowel habits, or black or bloody stools  Genito-Urinary ROS: no dysuria, trouble voiding, or hematuria    History  Past Medical History:   Diagnosis Date   • Anxiety    • Cholelithiasis 2004   • Chronic fatigue    • Diabetes mellitus (CMS/HCC)    • High cholesterol    • Hypertension 1999   • PCOS (polycystic ovarian syndrome)    • PONV (postoperative nausea and vomiting) 2004   • Rheumatoid arthritis (CMS/HCC)      Past Surgical History:   Procedure Laterality Date   • CARDIAC CATHETERIZATION N/A 9/3/2020    Procedure: Left Heart Cath;  Surgeon: Guille Nixon MD;  Location:  [facility] CATH INVASIVE LOCATION;  Service: Cardiovascular;  Laterality: N/A;    • CARDIAC CATHETERIZATION N/A 9/3/2020    Procedure: Percutaneous Coronary Intervention;  Surgeon: Guille Nixon MD;  Location: [facility] CATH INVASIVE LOCATION;  Service: Cardiovascular;  Laterality: N/A;   • CHOLECYSTECTOMY      lap   • ENDOSCOPY N/A 6/19/2020    Procedure: ESOPHAGOGASTRODUODENOSCOPY WITH ANESTHESIA;  Surgeon: Kermit Marlow MD;  Location: [facility] ENDOSCOPY;  Service: General;  Laterality: N/A;  pre: pre op scope  post op: gastritis, polyp  PCP: Gayatri Aquino MD   • WISDOM TOOTH EXTRACTION       Family History   Problem Relation Age of Onset   • Cancer Mother    • Hypertension Mother    • Obesity Mother    • Diabetes Mother    • Colon cancer Mother    • COPD Father    • Diabetes Father    • Obesity Father    • Hypertension Father    • Arthritis Father    • Depression Father    • Hearing loss Father    • Breast cancer Neg Hx    • Ovarian cancer Neg Hx    • Uterine cancer Neg Hx      Social History     Tobacco Use   • Smoking status: Former Smoker     Packs/day: 0.50     Years: 8.00     Pack years: 4.00     Types: Cigarettes     Start date: 6/20/1999     Quit date: 3/15/2006     Years since quitting: 15.3   • Smokeless tobacco: Never Used   Substance Use Topics   • Alcohol use: Not Currently   • Drug use: Never     E-cigarette/Vaping     E-cigarette/Vaping Substances     (Not in a hospital admission)    Allergies:  Penicillins      Current Outpatient Medications:   •  aspirin 81 MG EC tablet, Take 1 tablet by mouth Daily., Disp: 30 tablet, Rfl: 11  •  cholecalciferol (VITAMIN D3) 25 MCG (1000 UT) tablet, Take 1,000 Units by mouth Daily., Disp: , Rfl:   •  coenzyme Q10 100 MG capsule, Take 100 mg by mouth Daily., Disp: , Rfl:   •  Continuous Blood Gluc Sensor (Dexcom G6 Sensor), Use 1 each As Needed (glucose control) Every 10 days, Disp: 9 each, Rfl: 3  •  Continuous Blood Gluc Transmit (Dexcom G6 Transmitter) misc, Use 1 each Every 3 (Three) Months., Disp: 1 each, Rfl: 3  •   Cyanocobalamin (VITAMIN B 12) 500 MCG tablet, Take 1 tablet by mouth Daily., Disp: , Rfl:   •  FLUoxetine (PROzac) 20 MG capsule, Take 1 capsule by mouth daily, Disp: 30 capsule, Rfl: 3  •  insulin aspart (NovoLOG FlexPen) 100 UNIT/ML solution pen-injector sc pen, Inject 12 Units under the skin into the appropriate area as directed 3 (Three) Times a Day With Meals., Disp: 15 mL, Rfl: 11  •  Insulin Glargine (BASAGLAR KWIKPEN) 100 UNIT/ML injection pen, Inject 50 Units under the skin into the appropriate area as directed Every Night., Disp: 5 pen, Rfl: 11  •  Insulin Pen Needle 32G X 4 MM misc, Use as directed 4 times daily as needed, Disp: 100 each, Rfl: 11  •  Levonorgestrel (MIRENA, 52 MG, IU), 52 mg by Intrauterine route Continuous., Disp: , Rfl:   •  Multiple Vitamins-Minerals (MULTIVITAMIN WITH MINERALS) tablet tablet, Take 1 tablet by mouth Daily., Disp: , Rfl:   •  nebivolol (Bystolic) 10 MG tablet, Take 1 tablet by mouth every morning, Disp: 30 tablet, Rfl: 5  •  olmesartan-hydrochlorothiazide (BENICAR HCT) 40-12.5 MG per tablet, Take 1 tablet by mouth daily, Disp: 90 tablet, Rfl: 1  •  rosuvastatin (CRESTOR) 40 MG tablet, Take 1 tablet by mouth daily, Disp: 90 tablet, Rfl: 4  •  Semaglutide, 1 MG/DOSE, (Ozempic, 1 MG/DOSE,) 2 MG/1.5ML solution pen-injector, Inject 1 mg into the skin once a week, Disp: 6 pen, Rfl: 1  •  ticagrelor (BRILINTA) 90 MG tablet tablet, Take 1 tablet by mouth Every 12 (Twelve) Hours., Disp: 60 tablet, Rfl: 11  •  Insulin Lispro, 1 Unit Dial, (HumaLOG KwikPen) 100 UNIT/ML solution pen-injector, Inject 20 Units under the skin into the appropriate area with meals as directed., Disp: 18 mL, Rfl: 11  •  nitroglycerin (NITROSTAT) 0.4 MG SL tablet, Place 1 tablet under the tongue Every 5 (Five) Minutes As Needed for Chest Pain. Take no more than 3 doses in 15 minutes., Disp: 25 tablet, Rfl: 12  •  pantoprazole (PROTONIX) 40 MG EC tablet, Take 1 tablet by mouth Daily., Disp: 30 tablet, Rfl:  "1    Objective     Vital Signs  Temp:  [98.6 °F (37 °C)] 98.6 °F (37 °C)  Heart Rate:  [79] 79  BP: (139)/(85) 139/85  Body mass index is 42.9 kg/m².      07/14/21  1421   Weight: 112 kg (248 lb)       General Appearance:  awake, alert, oriented, in no acute distress      Results Review:   None        Assessment/Plan   Encounter Diagnoses   Name Primary?   • Class 3 severe obesity due to excess calories with serious comorbidity and body mass index (BMI) of 40.0 to 44.9 in adult (CMS/[facility]) Yes   • Type 2 diabetes mellitus with hyperglycemia, with long-term current use of insulin (CMS/[facility])    • Essential hypertension    • Fatty liver        She has been provided a structured dietary regimen based off of her behavior.  I discussed with the patient the etiology of the disease of obesity and the potential comorbid conditions associated with this disease.  She was instructed to follow the dietary regimen and follow-up with our program in 1 month's time with any additional questions as they may arise during this time.  We emphasized on focusing on proteins and meals high in fiber as well as adequate hydration that exceed 64 ounces of water daily.  I recommended the patient record daily a food journal that would incorporate what she is eating and how many times she is eating during the day.  My recommendation included at least 21 consecutive days of recording of these meals.  I explained that I anticipate the patient to lose weight prior to her next monthly visit.  I have also explained that they need to record or document when they are going to have the \"cheat day\" as well as create a food journal to help monitor their behavior and food choices.    I discussed the patient's findings and my recommendations with patient. The patient was made aware that we are primarily a surgical program.  We reviewed different weight loss surgical procedures including the laparoscopic sleeve gastrectomy, gastric band and Fredrick-en-Y gastric " "bypass.  I explained to the patient that medical treatment alone is ineffective for long-term results and for the reversal of morbid obesity.  The patient was made aware that due to her cardiac history prior to surgery consideration she will need evaluation of her cardiac risks for general anesthesia.    She and I discussed the etiology of the disease of morbid obesity.  I emphasized that weight loss alone statistically will not reverse this disease of obesity.  Our program is not a \"weight loss program\" but focuses on the overall issue of morbid obesity and the patient has been educated on what steps will be necessary to be successful in reversing this disease of morbid obesity at our facility.  I have explained the 3 pearls of our program for the patient to follow to optimize success:1.  Putting their health first, 2.  Not trying to treat the disease on their own, 3.  Attempting to make their scheduled appointments.  The patient was in agreement to following these recommendations.  The patient was also notified that our dietitian will be contacting them soon for follow-up on how they are doing with the new prescription.    I have also recommended that she obtain cardiac risk assessment and psychological risk assessment prior to surgery consideration.      Dr. Kermit Marlow MD FACS    07/14/21  15:01 CDT  Patient Care Team:  Gayatri Aquino MD as PCP - General  Gayatri Aquino MD as PCP - Family Medicine    "

## 2021-08-04 ENCOUNTER — OFFICE VISIT (OUTPATIENT)
Dept: CARDIOLOGY | Facility: CLINIC | Age: 41
End: 2021-08-04

## 2021-08-04 VITALS
WEIGHT: 242 LBS | HEART RATE: 84 BPM | SYSTOLIC BLOOD PRESSURE: 102 MMHG | DIASTOLIC BLOOD PRESSURE: 70 MMHG | OXYGEN SATURATION: 99 % | BODY MASS INDEX: 42.88 KG/M2 | HEIGHT: 63 IN

## 2021-08-04 DIAGNOSIS — E78.2 MIXED HYPERLIPIDEMIA: Chronic | ICD-10-CM

## 2021-08-04 DIAGNOSIS — I25.10 CORONARY ARTERY DISEASE INVOLVING NATIVE CORONARY ARTERY OF NATIVE HEART WITHOUT ANGINA PECTORIS: Primary | Chronic | ICD-10-CM

## 2021-08-04 DIAGNOSIS — I10 ESSENTIAL HYPERTENSION: Chronic | ICD-10-CM

## 2021-08-04 PROCEDURE — 93000 ELECTROCARDIOGRAM COMPLETE: CPT | Performed by: INTERNAL MEDICINE

## 2021-08-04 PROCEDURE — 99214 OFFICE O/P EST MOD 30 MIN: CPT | Performed by: INTERNAL MEDICINE

## 2021-08-04 NOTE — PROGRESS NOTES
Subjective:     Encounter Date:08/04/2021      Patient ID: Harry Panda is a 40 y.o. female with a history of coronary artery disease, status post anterior STEMI and PCI to the LAD in September 2020, hypertension, hyperlipidemia, type 2 diabetes mellitus, presenting today for follow-up.    Chief Complaint: Here today for routine follow-up    This patient presents today for routine follow-up.  She says that she has been doing well.  She has a history of coronary artery disease with previous LAD PCI at the time of an anterior STEMI in September 2020.  She remains on dual antiplatelet therapy at this time.  She has not had any significant bleeding issues.  She remains chest pain-free.  No significant shortness of breath or dyspnea on exertion.  No significant edema, orthopnea, PND.  No palpitations, lightheadedness, dizziness or syncope.  Blood pressure has been well controlled.  Cholesterol has been well controlled.  She says that her diabetes seems to be under somewhat better control at this time as well.  Overall, she says that she seems to be doing well and feeling well at this time.  At the time of her myocardial infarction, she was planning to undergo bariatric surgery and she may consider doing this again after being off of dual antiplatelet therapy.    Coronary Artery Disease  Presents for follow-up visit. Pertinent negatives include no chest pain, dizziness, leg swelling, palpitations or shortness of breath. The symptoms have been stable. Compliance with diet is variable. Compliance with exercise is variable. Compliance with medications is good.       Current Outpatient Medications:   •  aspirin 81 MG EC tablet, Take 1 tablet by mouth Daily., Disp: 30 tablet, Rfl: 11  •  cholecalciferol (VITAMIN D3) 25 MCG (1000 UT) tablet, Take 1,000 Units by mouth Daily., Disp: , Rfl:   •  coenzyme Q10 100 MG capsule, Take 100 mg by mouth Daily., Disp: , Rfl:   •  Continuous Blood Gluc Sensor (Dexcom G6 Sensor), Use 1  each As Needed (glucose control) Every 10 days, Disp: 9 each, Rfl: 3  •  Continuous Blood Gluc Transmit (Dexcom G6 Transmitter) misc, Use 1 each Every 3 (Three) Months., Disp: 1 each, Rfl: 3  •  Cyanocobalamin (VITAMIN B 12) 500 MCG tablet, Take 1 tablet by mouth Daily., Disp: , Rfl:   •  FLUoxetine (PROzac) 20 MG capsule, Take 1 capsule by mouth daily, Disp: 30 capsule, Rfl: 3  •  insulin aspart (NovoLOG FlexPen) 100 UNIT/ML solution pen-injector sc pen, Inject 12 Units under the skin into the appropriate area as directed 3 (Three) Times a Day With Meals., Disp: 15 mL, Rfl: 11  •  Insulin Glargine (BASAGLAR KWIKPEN) 100 UNIT/ML injection pen, Inject 50 Units under the skin into the appropriate area as directed Every Night., Disp: 5 pen, Rfl: 11  •  Insulin Pen Needle 32G X 4 MM misc, Use as directed 4 times daily as needed, Disp: 100 each, Rfl: 11  •  Levonorgestrel (MIRENA, 52 MG, IU), 52 mg by Intrauterine route Continuous., Disp: , Rfl:   •  Multiple Vitamins-Minerals (MULTIVITAMIN WITH MINERALS) tablet tablet, Take 1 tablet by mouth Daily., Disp: , Rfl:   •  nebivolol (Bystolic) 10 MG tablet, Take 1 tablet by mouth every morning, Disp: 30 tablet, Rfl: 5  •  nitroglycerin (NITROSTAT) 0.4 MG SL tablet, Place 1 tablet under the tongue Every 5 (Five) Minutes As Needed for Chest Pain. Take no more than 3 doses in 15 minutes., Disp: 25 tablet, Rfl: 12  •  olmesartan-hydrochlorothiazide (BENICAR HCT) 40-12.5 MG per tablet, Take 1 tablet by mouth daily, Disp: 90 tablet, Rfl: 1  •  rosuvastatin (CRESTOR) 40 MG tablet, Take 1 tablet by mouth daily, Disp: 90 tablet, Rfl: 4  •  Semaglutide, 1 MG/DOSE, (Ozempic, 1 MG/DOSE,) 2 MG/1.5ML solution pen-injector, Inject 1 mg into the skin once a week, Disp: 6 pen, Rfl: 1  •  ticagrelor (BRILINTA) 90 MG tablet tablet, Take 1 tablet by mouth Every 12 (Twelve) Hours., Disp: 60 tablet, Rfl: 11    Allergies   Allergen Reactions   • Penicillins Hives     Social History     Tobacco Use    • Smoking status: Former Smoker     Packs/day: 0.50     Years: 8.00     Pack years: 4.00     Types: Cigarettes     Start date: 6/20/1999     Quit date: 3/15/2006     Years since quitting: 15.4   • Smokeless tobacco: Never Used   Substance Use Topics   • Alcohol use: Not Currently     Review of Systems   Constitutional: Negative for fever and weight loss.   Cardiovascular: Negative for chest pain, dyspnea on exertion, leg swelling, orthopnea, palpitations, paroxysmal nocturnal dyspnea and syncope.   Respiratory: Negative for cough, shortness of breath and wheezing.    Hematologic/Lymphatic: Negative for adenopathy and bleeding problem.   Gastrointestinal: Negative for abdominal pain, nausea and vomiting.   Neurological: Negative for dizziness, headaches and loss of balance.       ECG 12 Lead    Date/Time: 8/4/2021 2:17 PM  Performed by: Guille Nixon MD  Authorized by: Guille Nixon MD   Comparison: compared with previous ECG   Similar to previous ECG  Rhythm: sinus rhythm  Rate: normal  BPM: 87  Conduction comments: RSR' in V1  QRS axis: normal  Other findings: poor R wave progression    Clinical impression: abnormal EKG             Objective:     Vitals reviewed.   Constitutional:       General: Not in acute distress.     Appearance: Healthy appearance. Well-developed.   Eyes:      Extraocular Movements: Extraocular movements intact.      Pupils: Pupils are equal, round, and reactive to light.   HENT:      Head: Normocephalic and atraumatic.   Neck:      Thyroid: No thyroid mass.      Vascular: No JVD.   Pulmonary:      Effort: Pulmonary effort is normal.      Breath sounds: Normal breath sounds. No wheezing. No rhonchi. No rales.   Cardiovascular:      Normal rate. Regular rhythm.      Murmurs: There is no murmur.      No gallop.   Pulses:     Intact distal pulses.   Edema:     Peripheral edema absent.   Abdominal:      General: Bowel sounds are normal. There is no distension.       "Palpations: Abdomen is soft.      Tenderness: There is no abdominal tenderness.   Musculoskeletal: Normal range of motion.      Extremities: No clubbing present.     Cervical back: Normal range of motion and neck supple. Skin:     General: Skin is warm and dry. There is no cyanosis.      Findings: No erythema or rash.   Neurological:      Mental Status: Alert and oriented to person, place, and time.      Cranial Nerves: No cranial nerve deficit.       /70   Pulse 84   Ht 160 cm (63\")   Wt 110 kg (242 lb)   SpO2 99%   BMI 42.87 kg/m²     Data/Lab Review:     Lab Results   Component Value Date    CHOL 123 (L) 01/14/2021    CHLPL 157 05/17/2016    TRIG 154 (H) 01/14/2021    HDL 39 (L) 01/14/2021    LDL 58 01/14/2021     =========================================    Echo 9/4/20:  · Left ventricular systolic function is low normal. Estimated EF appears to be in the range of 51 - 55%.  · The following left ventricular wall segments are hypokinetic: apical septal, basal inferoseptal, mid inferoseptal, mid anteroseptal and basal inferoseptal.  · Left ventricular wall thickness is consistent with mild concentric hypertrophy.  · No hemodynamically significant valvular abnormalities identified on this study.    ==========================================    Cardiac Cath and PCI to LAD 9/3/20 (images viewed again today):    Selective Coronary Angiography:     Left Main Coronary Artery: The left main coronary artery arises from the left coronary cusp and bifurcates into the LAD and left circumflex arteries.  The left main coronary artery is a short vessel with no significant disease.     Left Anterior Descending Artery: The left anterior descending artery arises from the distal left main coronary artery and supplies one diagonal branch before being acutely occluded in the midportion the vessel.     Left Circumflex: The left circumflex artery arises from the distal left main artery and is dominant for the posterior " "circulation, supplying a large caliber first obtuse marginal branch with multiple terminal branches and a PL system along with PDA.  Left circumflex system has no more than mild luminal irregularities.     Right Coronary Artery: The right coronary artery arises from the right coronary cusp and is a small caliber nondominant vessel with no significant disease.     Percutaneous Coronary Intervention to the mid left anterior descending artery:      Guide Catheter: 6 Turkish XB 3.5  Anticoagulation: Angiomax bolus and drip  Wire(s): 0.014\" Prowater  Balloon: 2.0 x 12  Stent (s): 3.5 x 15 mm Xience     After the initial diagnostic images were obtained, the LAD was found to be acutely occluded.  An XB 3.5 guide catheter was advanced to the left coronary ostium.  A 0.014 inch pro-water wire was advanced across the occlusion and the distal end of the wire resided in the left anterior descending artery.  Even with the wire crossing the lesion, flow was restored in the left anterior descending artery.  A 2.0 mm balloon was used to dilate a severe stenosis that was the culprit lesion.  Flow was then restored in the LAD, revealing a large diagonal branch near the previously occluded segment.  The level of occlusion was in the mid left anterior descending artery.  The lesion in the mid left anterior descending artery was then stented with a 3.5 x 15 mm Xience drug-eluting stent.  Following stent deployment, the stent appears to have no residual stenosis and there is DARWIN-3 flow down the LAD all the way into the recurrent apical branch.  The flow down the diagonal branch remains preserved with no obstruction of flow into the diagonal branch and DARWIN-3 flow.  No evidence of dissection, perforation or distal embolization.  The angiographic result is considered excellent.     Left Heart Catheterization:  -Left ventricular pressure: 123/5 mmHg  -Aortic pressure: 125/75 mmHg     Left Ventriculography: Preserved left ventricular " ejection fraction with no regional wall motion abnormalities appreciated and no significant mitral valve regurgitation noted.      Assessment:          Diagnosis Plan   1. Coronary artery disease involving native coronary artery of native heart without angina pectoris  ECG 12 Lead   2. Essential hypertension     3. Mixed hyperlipidemia          Plan:       1.  Coronary artery disease: This patient remains clinically stable at this time and does not describe any ischemic symptoms.  She does remain on dual antiplatelet therapy and it would be recommended to do so for 12 months from the time of her PCI, therefore she can likely discontinue Brilinta next month.  We did discuss the need for lifelong aspirin without interruption if at all possible, however.  She does also remain on beta-blocker and statin therapies.  No changes at this time.    2.  Essential hypertension: Blood pressure is well controlled at this time.  Continue current medications.    3.  Mixed hyperlipidemia: The patient is tolerating statin therapy well.  Her LDL cholesterol is at goal on her last check.    We will plan to see the patient back in 6 months unless otherwise needed sooner.

## 2021-08-16 ENCOUNTER — TELEPHONE (OUTPATIENT)
Dept: BARIATRICS/WEIGHT MGMT | Facility: CLINIC | Age: 41
End: 2021-08-16

## 2021-08-17 ENCOUNTER — OFFICE VISIT (OUTPATIENT)
Dept: BARIATRICS/WEIGHT MGMT | Facility: HOSPITAL | Age: 41
End: 2021-08-17

## 2021-08-17 ENCOUNTER — OFFICE VISIT (OUTPATIENT)
Dept: BARIATRICS/WEIGHT MGMT | Facility: CLINIC | Age: 41
End: 2021-08-17

## 2021-08-17 VITALS
HEIGHT: 64 IN | DIASTOLIC BLOOD PRESSURE: 73 MMHG | SYSTOLIC BLOOD PRESSURE: 111 MMHG | BODY MASS INDEX: 41.83 KG/M2 | WEIGHT: 245 LBS | TEMPERATURE: 98.6 F | OXYGEN SATURATION: 97 % | HEART RATE: 95 BPM

## 2021-08-17 DIAGNOSIS — E66.01 CLASS 3 SEVERE OBESITY DUE TO EXCESS CALORIES WITH SERIOUS COMORBIDITY AND BODY MASS INDEX (BMI) OF 40.0 TO 44.9 IN ADULT (HCC): Primary | Chronic | ICD-10-CM

## 2021-08-17 DIAGNOSIS — Z79.4 TYPE 2 DIABETES MELLITUS WITH HYPERGLYCEMIA, WITH LONG-TERM CURRENT USE OF INSULIN (HCC): ICD-10-CM

## 2021-08-17 DIAGNOSIS — E11.65 TYPE 2 DIABETES MELLITUS WITH HYPERGLYCEMIA, WITH LONG-TERM CURRENT USE OF INSULIN (HCC): ICD-10-CM

## 2021-08-17 PROCEDURE — 99212 OFFICE O/P EST SF 10 MIN: CPT | Performed by: SURGERY

## 2021-08-17 NOTE — PROGRESS NOTES
"Patient Care Team:  Gayatri Aquino MD as PCP - General  Gayatri Aquino MD as PCP - Family Medicine    Reason for Visit:  Surgical Weight loss      Subjective   Harry Panda is a 40 y.o. female.     Harry is here for follow-up and continued medical management of her morbid obesity.  She is currently on a prescription diet.  Harry previously was to apply dietary changes such as following the meal plan as directed.  She admits to following the dietary prescription.  As a result she lost weight since her last visit.    Review Of Systems:  General ROS: positive for  - fatigue  Respiratory ROS: no cough, shortness of breath, or wheezing  Cardiovascular ROS: no chest pain or dyspnea on exertion  Gastrointestinal ROS: no abdominal pain, change in bowel habits, or black or bloody stools    The following portions of the patient's history were reviewed and updated as appropriate: allergies, current medications, past family history, past medical history, past social history, past surgical history and problem list.    Objective   /73 (BP Location: Right arm, Patient Position: Sitting, Cuff Size: Adult)   Pulse 95   Temp 98.6 °F (37 °C)   Ht 161.9 cm (63.75\")   Wt 111 kg (245 lb)   SpO2 97%   BMI 42.38 kg/m²       08/17/21  1436   Weight: 111 kg (245 lb)       General Appearance:  awake, alert, oriented, in no acute distress    Assessment/Plan     Encounter Diagnoses   Name Primary?   • Class 3 severe obesity due to excess calories with serious comorbidity and body mass index (BMI) of 40.0 to 44.9 in adult (CMS/[facility]) Yes   • Type 2 diabetes mellitus with hyperglycemia, with long-term current use of insulin (CMS/[facility])        Harry Panda was seen today for follow-up, obesity, nutrition counseling and weight loss.  She has lost weight since her last visit.  Today we discussed healthy changes in lifestyle, diet, and exercise. Dietician consultation obtained.  Harry Panda had received handouts to her explaining the " recommendation on portion sizes/appetite control/reading nutrition labels.   Intensive behavioral therapy for obesity was done today as well.   Goals for this month are: Patient is to continue following the dietary prescription as directed.    Follow up in one month for a weight recheck.

## 2021-08-17 NOTE — PROGRESS NOTES
"Nutrition Services    Patient Name:  Harry Panda  YOB: 1980  MRN: [id number]  Admit Date:  (Not on file)    NUTRITION BARIATRIC/[facility] NOTE     Visit 2  Initial Assessment   BA#   [facility]    Anthropometrics   Height: 63.75 in  Weight: 245 lbs  BMI: 42.38      Nutrition Recall  Eating ___4___ meals daily   Meeting protein daily goal  Making healthier choices  Limited sweet intake  Monitoring portions  Drinking greater to or equal to 64 fluid ounces  Replacing __occasional________ meal with protein shake daily     Education    Review of 4 meal per day diet plan  4 meal per day sample menu  \"Scaling back: How to manage Your Portion Sizes and Reading Food Labels\"  Reinforce Nutritional Needs for Surgery  Reinforce Nutritional Needs for [facility]    Nutrition Goals   Continue diet changes  Eat __4___ meals per day with protein  Protein goal: 65gms   discussed protein guidelines for shakes and bar  Eliminate snacks  Healthier food choices  Portion control / Use smaller plate or measuring cup   Continue with at least 64 oz per day        Electronically signed by:  Thelma Celis  08/17/21 16:06 CDT   "

## 2021-08-23 ENCOUNTER — TELEPHONE (OUTPATIENT)
Dept: ENDOCRINOLOGY | Facility: CLINIC | Age: 41
End: 2021-08-23

## 2021-08-23 RX ORDER — INSULIN LISPRO 100 [IU]/ML
20 INJECTION, SOLUTION INTRAVENOUS; SUBCUTANEOUS
Qty: 15 ML | Refills: 11 | Status: SHIPPED | OUTPATIENT
Start: 2021-08-23 | End: 2022-02-07

## 2021-08-23 NOTE — TELEPHONE ENCOUNTER
[facility] [place]        Left a vm stating that insurance is not covering the Novolog, they are needing a script for Humalog Quickpens sent in, please.

## 2021-09-01 ENCOUNTER — TELEMEDICINE (OUTPATIENT)
Dept: ENDOCRINOLOGY | Facility: CLINIC | Age: 41
End: 2021-09-01

## 2021-09-01 DIAGNOSIS — E11.65 TYPE 2 DIABETES MELLITUS WITH HYPERGLYCEMIA, WITH LONG-TERM CURRENT USE OF INSULIN (HCC): Primary | ICD-10-CM

## 2021-09-01 DIAGNOSIS — E55.9 VITAMIN D DEFICIENCY: ICD-10-CM

## 2021-09-01 DIAGNOSIS — E78.2 MIXED HYPERLIPIDEMIA: ICD-10-CM

## 2021-09-01 DIAGNOSIS — I10 ESSENTIAL HYPERTENSION: ICD-10-CM

## 2021-09-01 DIAGNOSIS — Z79.4 TYPE 2 DIABETES MELLITUS WITH HYPERGLYCEMIA, WITH LONG-TERM CURRENT USE OF INSULIN (HCC): Primary | ICD-10-CM

## 2021-09-01 PROCEDURE — 99214 OFFICE O/P EST MOD 30 MIN: CPT | Performed by: NURSE PRACTITIONER

## 2021-09-01 PROCEDURE — 95251 CONT GLUC MNTR ANALYSIS I&R: CPT | Performed by: NURSE PRACTITIONER

## 2021-09-01 NOTE — PROGRESS NOTES
Chief Complaint  Diabetes    Subjective          Harry Panda presents to [facility] ENDOCRINOLOGY  History of Present Illness       You have chosen to receive care through a telehealth visit.  Do you consent to use a video/audio connection for your medical care today? Yes            TELEHEALTH VIDEO VISIT     This a video visit due to [facility] current guidelines for social distancing due to the COVID 19 pandemic    Referring provider Gayatri Bailon MD     40-year-old female presents for follow-up     Reason diabetes mellitus type 2     Also has a history of PCOS     Duration diagnosed in 2016     Quality not controlled     Timing is constant     Severity is moderate                  Lab Results   Component Value Date     HGBA1C 8.3 (H) 05/04/2021            Macrovascular complications--- had MI last year from blood clot      Microvascular complications--- no neuropathy , no DR , no renal disease      Current diabetes regimen-- insulin , GLP- 1      Current glucose monitoring device     Now has the dexcom G 6         Checks 4 times daily               Review of Systems - General ROS: negative              Objective   Vital Signs:   There were no vitals taken for this visit.    Physical Exam  Constitutional:       Appearance: Normal appearance.   Neurological:      General: No focal deficit present.      Mental Status: She is alert.   Psychiatric:         Mood and Affect: Mood normal.         Thought Content: Thought content normal.         Judgment: Judgment normal.        Result Review :   The following data was reviewed by: MARKUS Gomez on 09/01/2021:  Common labs    Common Labsle 1/14/21 1/14/21 1/14/21 5/4/21 5/4/21 5/4/21 5/4/21    1006 1006 1006 1026 1026 1026 1026   Glucose  151 (A)    185 (A)    BUN  10    11    Creatinine  0.56    0.66    eGFR Non African Am  120    99    Sodium  141    139    Potassium  4.0    4.3    Chloride  104    99    Calcium  9.4    9.3    Albumin   4.50    4.60    Total Bilirubin  0.7    0.7    Alkaline Phosphatase  62    65    AST (SGOT)  32    33    ALT (SGPT)  28    34    WBC    8.40      Hemoglobin    13.6      Hematocrit    39.7      Platelets    260      Total Cholesterol   123 (A)       Triglycerides   154 (A)       HDL Cholesterol   39 (A)       LDL Cholesterol    58       Hemoglobin A1C 7.8 (A)    8.3 (A)     Microalbumin, Urine       30.9   (A) Abnormal value                      Assessment and Plan    Diagnoses and all orders for this visit:    1. Type 2 diabetes mellitus with hyperglycemia, with long-term current use of insulin (CMS/[facility]) (Primary)  -     CBC & Differential; Future  -     Comprehensive Metabolic Panel; Future  -     Hemoglobin A1c; Future  -     TSH; Future  -     Vitamin D 25 Hydroxy; Future  -     Vitamin B12; Future  -     Lipid Panel; Future  -     Microalbumin / Creatinine Urine Ratio - Urine, Clean Catch; Future  -     Protein / Creatinine Ratio, Urine - Urine, Clean Catch; Future    2. Essential hypertension  -     CBC & Differential; Future  -     Comprehensive Metabolic Panel; Future  -     Hemoglobin A1c; Future  -     TSH; Future  -     Vitamin D 25 Hydroxy; Future  -     Vitamin B12; Future  -     Lipid Panel; Future  -     Microalbumin / Creatinine Urine Ratio - Urine, Clean Catch; Future  -     Protein / Creatinine Ratio, Urine - Urine, Clean Catch; Future    3. Mixed hyperlipidemia  -     CBC & Differential; Future  -     Comprehensive Metabolic Panel; Future  -     Hemoglobin A1c; Future  -     TSH; Future  -     Vitamin D 25 Hydroxy; Future  -     Vitamin B12; Future  -     Lipid Panel; Future  -     Microalbumin / Creatinine Urine Ratio - Urine, Clean Catch; Future  -     Protein / Creatinine Ratio, Urine - Urine, Clean Catch; Future    4. Vitamin D deficiency  -     CBC & Differential; Future  -     Comprehensive Metabolic Panel; Future  -     Hemoglobin A1c; Future  -     TSH; Future  -     Vitamin D 25 Hydroxy;  Future  -     Vitamin B12; Future  -     Lipid Panel; Future  -     Microalbumin / Creatinine Urine Ratio - Urine, Clean Catch; Future  -     Protein / Creatinine Ratio, Urine - Urine, Clean Catch; Future           Glycemic management     Type 2 diabetes               Lab Results   Component Value Date     HGBA1C 8.3 (H) 05/04/2021                      Postprandial hyperglycemia needs to adjust mealtime insulin           Basaglar taking 50  units keep      If you wake up less than 80 decrease to 40 units       Ozempic 1 mg once weekly taking          Humalog      Takes  4 units up to  6 units TID before meals      Increase up to 8 unit with meals    Check 2 hour  Post and goal is 180 or less that lets you know if the dose is correct         + sliding scale      151-200 -- 1 units   201 -250    2 units   251-300     3 units   Above 301  4 units               Metformin -- could not tolerate                   jardiance stopped due to chronic yeast infection and UTI                   Microvascular complication surveillance     Last eye exam--- Feb. 2021      Neuropathy--- none               Lipid management        Taking crestor 40 mg one at night         Total Cholesterol   Date Value Ref Range Status   01/14/2021 123 (L) 130 - 200 mg/dL Final     Triglycerides   Date Value Ref Range Status   01/14/2021 154 (H) 0 - 149 mg/dL Final     HDL Cholesterol   Date Value Ref Range Status   01/14/2021 39 (L) >=50 mg/dL Final     LDL Cholesterol    Date Value Ref Range Status   01/14/2021 58 0 - 99 mg/dL Final        Blood pressure management        Taking Benicar HCT 40-12.5 mg daily      Taking  Bystolic 10 mg daily            Thyroid health     Lab Results   Component Value Date    TSH 2.130 05/04/2021             Other related diabetes aspects     No results found for: [id number]             Weight management            preventative care     Non smoker                Follow Up   No follow-ups on file.  Patient was given  instructions and counseling regarding her condition or for health maintenance advice. Please see specific information pulled into the AVS if appropriate.         This document has been electronically signed by MARKUS Gomez on September 1, 2021 16:34 CDT.

## 2021-09-21 ENCOUNTER — NUTRITION (OUTPATIENT)
Dept: BARIATRICS/WEIGHT MGMT | Facility: HOSPITAL | Age: 41
End: 2021-09-21

## 2021-09-21 ENCOUNTER — OFFICE VISIT (OUTPATIENT)
Dept: BARIATRICS/WEIGHT MGMT | Facility: CLINIC | Age: 41
End: 2021-09-21

## 2021-09-21 VITALS
BODY MASS INDEX: 41.73 KG/M2 | TEMPERATURE: 98.6 F | OXYGEN SATURATION: 97 % | HEART RATE: 87 BPM | HEIGHT: 64 IN | SYSTOLIC BLOOD PRESSURE: 108 MMHG | DIASTOLIC BLOOD PRESSURE: 72 MMHG | WEIGHT: 244.4 LBS

## 2021-09-21 DIAGNOSIS — Z79.4 TYPE 2 DIABETES MELLITUS WITH HYPERGLYCEMIA, WITH LONG-TERM CURRENT USE OF INSULIN (HCC): ICD-10-CM

## 2021-09-21 DIAGNOSIS — E78.2 MIXED HYPERLIPIDEMIA: Chronic | ICD-10-CM

## 2021-09-21 DIAGNOSIS — I10 ESSENTIAL HYPERTENSION: Chronic | ICD-10-CM

## 2021-09-21 DIAGNOSIS — E11.65 TYPE 2 DIABETES MELLITUS WITH HYPERGLYCEMIA, WITH LONG-TERM CURRENT USE OF INSULIN (HCC): ICD-10-CM

## 2021-09-21 DIAGNOSIS — E66.01 CLASS 3 SEVERE OBESITY DUE TO EXCESS CALORIES WITH SERIOUS COMORBIDITY AND BODY MASS INDEX (BMI) OF 40.0 TO 44.9 IN ADULT (HCC): Primary | Chronic | ICD-10-CM

## 2021-09-21 PROCEDURE — 99213 OFFICE O/P EST LOW 20 MIN: CPT | Performed by: NURSE PRACTITIONER

## 2021-09-21 NOTE — PROGRESS NOTES
"Patient Care Team:  Gayatri Aquino MD as PCP - General  Gayatri Aquino MD as PCP - Family Medicine    Reason for Visit:  Surgical Weight loss    Subjective   Harry Panda is a 40 y.o. female.     Harry is here for follow-up and continued medical management of her morbid obesity.  She is currently on a prescription diet.  Harry previously was to apply dietary changes such as following the meal plan as directed.  She admits to eating 4 meals per day.  As a result she lost weight since her last visit.  Patient lost 1 pound since her last visit with us.  Patient states that she is following the prescription meal plan.  Patient had surgery scheduled last year and due to a heart attack had to cancel her surgical procedure.  Patient sees Dr. Nixon for cardiology care.    Review Of Systems:  Review of Systems   Constitutional: Positive for fatigue.   Respiratory: Negative.    Cardiovascular: Negative.    Gastrointestinal: Negative.    Endocrine: Negative.    Musculoskeletal: Negative.    Neurological: Positive for headache.   Psychiatric/Behavioral: The patient is nervous/anxious.      The following portions of the patient's history were reviewed and updated as appropriate: allergies, current medications, past family history, past medical history, past social history, past surgical history, and problem list.    Objective   /72 (BP Location: Right arm, Patient Position: Sitting, Cuff Size: Adult)   Pulse 87   Temp 98.6 °F (37 °C)   Ht 161.9 cm (63.75\")   Wt 111 kg (244 lb 6.4 oz)   SpO2 97%   BMI 42.28 kg/m²       09/21/21  1431   Weight: 111 kg (244 lb 6.4 oz)       Physical Exam  Vitals reviewed.   Constitutional:       Appearance: She is obese.   Cardiovascular:      Rate and Rhythm: Normal rate and regular rhythm.   Pulmonary:      Effort: Pulmonary effort is normal.   Musculoskeletal:         General: Normal range of motion.   Skin:     General: Skin is warm and dry.   Neurological:      Mental Status: " She is alert and oriented to person, place, and time.   Psychiatric:         Mood and Affect: Mood normal.         Behavior: Behavior normal.         Patient's Body mass index is 42.28 kg/m². indicating that she is morbidly obese (BMI > 40 or > 35 with obesity - related health condition). Obesity-related health conditions include the following: hypertension, diabetes mellitus and dyslipidemias. Obesity is improving with treatment. BMI is is above average; BMI management plan is completed. We discussed portion control and increasing exercise..     Assessment/Plan   Diagnoses and all orders for this visit:    1. Class 3 severe obesity due to excess calories with serious comorbidity and body mass index (BMI) of 40.0 to 44.9 in adult (CMS/[facility]) (Primary)  Assessment & Plan:  Patient's (Body mass index is 42.28 kg/m².) indicates that they are morbidly obese (BMI > 40 or > 35 with obesity - related health condition) with health conditions that include hypertension, diabetes mellitus and dyslipidemias . Weight is improving with treatment. BMI is is above average; BMI management plan is completed. We discussed portion control and increasing exercise.       2. Mixed hyperlipidemia    3. Essential hypertension  Comments:  Continue current regimen.  Nutrition counseling provided.    4. Type 2 diabetes mellitus with hyperglycemia, with long-term current use of insulin (CMS/[facility])  Comments:  Continue current regimen.  Nutrition counseling provided.  Continue to monitor glucose levels and follow-up with PCP for medication changes if needed.  Assessment & Plan:  Diabetes is improving with treatment.   Continue current treatment regimen.  Diabetes will be reassessed with PCP.         Harry Panda was seen today for follow-up, obesity, nutrition counseling and weight loss.  She has lost weight since her last visit.  Today we discussed healthy changes in lifestyle, diet, and exercise. Dietician consultation obtained.  Harry Panda  had received handouts to her explaining the recommendation on portion sizes/appetite control/reading nutrition labels.   Intensive behavioral therapy for obesity was done today as well.     Goals for this month are:   1.  Continue to follow prescription meal plan.  I did a 24-hour recall with patient and all meals were within normal limits.  Patient will see dietitian today and patient has been advised to keep a food journal and to bring her food journal to her next appointment.    Patient is a restart from 9/2020 and had completed the program last year and prior to her sleeve gastrectomy patient suffered a heart attack.  I discussed her situation with Dr. Marlow and he was agreeable to see her next month to discuss scheduling a sleeve gastrectomy.  Patient was advised that she must lose weight and follow the prescription meal plan to be evaluated for the sleeve gastrectomy.  Patient verbalizes understanding.  Patient has an appointment with psychiatry on October 4 and she will be contacting her cardiology for a surgical risk assessment and request to be sent to us.    Follow up in one month for a weight recheck.A total of 20 minutes was spent face to face with this patient and over half of the time was spent on counseling and coordination of care for the disease of obesity. We specifically reviewed the dietary prescription and I made recommendations toward increasing exercise as tolerated as well as focusing on training their behavior toward storing less.

## 2021-09-21 NOTE — ASSESSMENT & PLAN NOTE
Patient's (Body mass index is 42.28 kg/m².) indicates that they are morbidly obese (BMI > 40 or > 35 with obesity - related health condition) with health conditions that include hypertension, diabetes mellitus and dyslipidemias . Weight is improving with treatment. BMI is is above average; BMI management plan is completed. We discussed portion control and increasing exercise.

## 2021-09-21 NOTE — ASSESSMENT & PLAN NOTE
Diabetes is improving with treatment.   Continue current treatment regimen.  Diabetes will be reassessed with PCP.

## 2021-09-21 NOTE — PROGRESS NOTES
"Nutrition Services    Patient Name:  Harry Panda  YOB: 1980  MRN: [id number]  Admit Date:  (Not on file)    NUTRITION BARIATRIC/MWL NOTE     Visit 2  Initial Assessment   BA#   MWL    Anthropometrics   Height: 63.75 in  Weight: 244 lbs 6.4 oz  BMI: 42.28    Nutrition Recall  Eating __4____ meals daily   Meeting protein daily goal  Snacking-none  Making healthier choices  Limited sweet intake  Monitoring portions  Drinking carbonated beverages-none  Drinking greater to or equal to 64 fluid ounces  Replacing ______1____ meal with protein shake daily     Education    Review of 4 meals per day diet plan  \"Scaling back: How to manage Your Portion Sizes and Reading Food Labels\"  Reinforce Nutritional Needs for Surgery  Reinforce Nutritional Needs for MWL    Nutrition Goals   Continue diet changes  Eat __4___ meals per day with protein  Eat protein first at meals  Protein goal: 65gms    discussed protein guidelines for shakes and bar  Healthier food choices  Portion control / Use smaller plate or measuring cup   Increase fluid intake to 64 ounces per day    Electronically signed by:  Thelma Celis  09/21/21 15:31 CDT   "

## 2021-10-14 ENCOUNTER — TELEPHONE (OUTPATIENT)
Dept: GASTROENTEROLOGY | Facility: CLINIC | Age: 41
End: 2021-10-14

## 2021-10-15 ENCOUNTER — PREP FOR SURGERY (OUTPATIENT)
Dept: OTHER | Facility: HOSPITAL | Age: 41
End: 2021-10-15

## 2021-10-15 DIAGNOSIS — Z80.0 FAMILY HISTORY OF COLON CANCER: Primary | ICD-10-CM

## 2021-10-15 RX ORDER — SODIUM PICOSULFATE, MAGNESIUM OXIDE, AND ANHYDROUS CITRIC ACID 10; 3.5; 12 MG/160ML; G/160ML; G/160ML
1 LIQUID ORAL TAKE AS DIRECTED
Qty: 320 ML | Refills: 0 | Status: SHIPPED | OUTPATIENT
Start: 2021-10-15 | End: 2021-12-02

## 2021-10-18 ENCOUNTER — OFFICE VISIT (OUTPATIENT)
Dept: BARIATRICS/WEIGHT MGMT | Facility: CLINIC | Age: 41
End: 2021-10-18

## 2021-10-18 VITALS
HEART RATE: 84 BPM | TEMPERATURE: 98.2 F | OXYGEN SATURATION: 98 % | BODY MASS INDEX: 41.48 KG/M2 | SYSTOLIC BLOOD PRESSURE: 120 MMHG | DIASTOLIC BLOOD PRESSURE: 84 MMHG | HEIGHT: 64 IN | WEIGHT: 243 LBS

## 2021-10-18 DIAGNOSIS — K76.0 FATTY LIVER: ICD-10-CM

## 2021-10-18 DIAGNOSIS — E66.01 CLASS 3 SEVERE OBESITY DUE TO EXCESS CALORIES WITH SERIOUS COMORBIDITY AND BODY MASS INDEX (BMI) OF 40.0 TO 44.9 IN ADULT (HCC): Primary | Chronic | ICD-10-CM

## 2021-10-18 DIAGNOSIS — I10 ESSENTIAL HYPERTENSION: Chronic | ICD-10-CM

## 2021-10-18 DIAGNOSIS — E78.2 MIXED HYPERLIPIDEMIA: Chronic | ICD-10-CM

## 2021-10-18 PROBLEM — Z80.0 FAMILY HISTORY OF COLON CANCER: Status: ACTIVE | Noted: 2021-10-18

## 2021-10-18 PROCEDURE — 99213 OFFICE O/P EST LOW 20 MIN: CPT | Performed by: SURGERY

## 2021-10-18 NOTE — PROGRESS NOTES
"Patient Care Team:  Gayatri Aquino MD as PCP - General  Gayatri Aquino MD as PCP - Family Medicine    Reason for Visit:  Surgical Weight loss      Subjective   Harry Panda is a 40 y.o. female.     Harry is here for follow-up and continued medical management of her morbid obesity.  She is currently on a prescription diet.  Harry previously was to apply dietary changes such as following the meal plan as directed.  She admits to struggling.  As a result she gained weight since her last visit.    Review Of Systems:  General ROS: negative  Respiratory ROS: no cough, shortness of breath, or wheezing  Cardiovascular ROS: no chest pain or dyspnea on exertion  Gastrointestinal ROS: no abdominal pain, change in bowel habits, or black or bloody stools    The following portions of the patient's history were reviewed and updated as appropriate: allergies, current medications, past family history, past medical history, past social history, past surgical history and problem list.    Objective   /84 (BP Location: Right arm, Patient Position: Sitting, Cuff Size: Adult)   Pulse 84   Temp 98.2 °F (36.8 °C)   Ht 161.9 cm (63.75\")   Wt 110 kg (243 lb)   SpO2 98%   BMI 42.04 kg/m²       10/18/21  1441   Weight: 110 kg (243 lb)       General Appearance:  awake, alert, oriented, in no acute distress    Assessment/Plan     Encounter Diagnoses   Name Primary?   • Class 3 severe obesity due to excess calories with serious comorbidity and body mass index (BMI) of 40.0 to 44.9 in adult (HCC) Yes   • Essential hypertension    • Mixed hyperlipidemia    • Fatty liver        Harry Panda was seen today for follow-up, obesity, nutrition counseling and weight loss.  She has gained weight since her last visit.  Today we discussed healthy changes in lifestyle, diet, and exercise. Dietician consultation obtained.  Harry Panda had received handouts to her explaining the recommendation on portion sizes/appetite " control/reading nutrition labels.   Intensive behavioral therapy for obesity was done today as well.   Goals for this month are: I reviewed with the patient her food journal and identified food choices that were not consistent with the meal plan.  Patient and I reviewed this in detail and I appreciate the time that she took to creating a food journal.  After reviewing the food journal the patient was clear in understanding which foods to avoid during her meals 3 and 4 and to make sure she is incorporating vegetables with each of her meals.  She will follow up with us accordingly for possible surgery submitted.    A total of 20 minutes was spent face-to-face with this patient during this encounter and over half of the time was spent on counseling and coordination of care for morbid obesity.    Follow up in one month for a weight recheck.

## 2021-10-19 ENCOUNTER — LAB (OUTPATIENT)
Dept: LAB | Facility: HOSPITAL | Age: 41
End: 2021-10-19

## 2021-10-19 DIAGNOSIS — E78.2 MIXED HYPERLIPIDEMIA: ICD-10-CM

## 2021-10-19 DIAGNOSIS — E11.65 TYPE 2 DIABETES MELLITUS WITH HYPERGLYCEMIA, WITH LONG-TERM CURRENT USE OF INSULIN (HCC): ICD-10-CM

## 2021-10-19 DIAGNOSIS — Z79.4 TYPE 2 DIABETES MELLITUS WITH HYPERGLYCEMIA, WITH LONG-TERM CURRENT USE OF INSULIN (HCC): ICD-10-CM

## 2021-10-19 DIAGNOSIS — E55.9 VITAMIN D DEFICIENCY: ICD-10-CM

## 2021-10-19 DIAGNOSIS — I10 ESSENTIAL HYPERTENSION: ICD-10-CM

## 2021-10-19 LAB
25(OH)D3 SERPL-MCNC: 38.5 NG/ML (ref 30–100)
ALBUMIN SERPL-MCNC: 4.7 G/DL (ref 3.5–5)
ALBUMIN UR-MCNC: 3.6 MG/DL
ALBUMIN/GLOB SERPL: 1.4 G/DL (ref 1.1–2.5)
ALP SERPL-CCNC: 70 U/L (ref 24–120)
ALT SERPL W P-5'-P-CCNC: 25 U/L (ref 0–35)
ANION GAP SERPL CALCULATED.3IONS-SCNC: 6 MMOL/L (ref 4–13)
AST SERPL-CCNC: 28 U/L (ref 7–45)
AUTO MIXED CELLS #: 0.3 10*3/MM3 (ref 0.1–2.6)
AUTO MIXED CELLS %: 4.1 % (ref 0.1–24)
BILIRUB SERPL-MCNC: 0.6 MG/DL (ref 0.1–1)
BUN SERPL-MCNC: 11 MG/DL (ref 5–21)
BUN/CREAT SERPL: 20.4
CALCIUM SPEC-SCNC: 10 MG/DL (ref 8.4–10.4)
CHLORIDE SERPL-SCNC: 104 MMOL/L (ref 98–110)
CHOLEST SERPL-MCNC: 112 MG/DL (ref 130–200)
CO2 SERPL-SCNC: 28 MMOL/L (ref 24–31)
CREAT SERPL-MCNC: 0.54 MG/DL (ref 0.5–1.4)
CREAT UR-MCNC: 192.7 MG/DL
CREAT UR-MCNC: 192.7 MG/DL
ERYTHROCYTE [DISTWIDTH] IN BLOOD BY AUTOMATED COUNT: 12.1 % (ref 12.3–15.4)
GFR SERPL CREATININE-BSD FRML MDRD: 125 ML/MIN/1.73
GLOBULIN UR ELPH-MCNC: 3.4 GM/DL
GLUCOSE SERPL-MCNC: 164 MG/DL (ref 70–100)
HBA1C MFR BLD: 7.5 % (ref 4.8–5.9)
HCT VFR BLD AUTO: 40.6 % (ref 34–46.6)
HDLC SERPL-MCNC: 30 MG/DL
HGB BLD-MCNC: 13.8 G/DL (ref 12–15.9)
LDLC SERPL CALC-MCNC: 56 MG/DL (ref 0–99)
LDLC/HDLC SERPL: 1.76 {RATIO}
LYMPHOCYTES # BLD AUTO: 3 10*3/MM3 (ref 0.7–3.1)
LYMPHOCYTES NFR BLD AUTO: 37.1 % (ref 19.6–45.3)
MCH RBC QN AUTO: 28.8 PG (ref 26.6–33)
MCHC RBC AUTO-ENTMCNC: 34 G/DL (ref 31.5–35.7)
MCV RBC AUTO: 84.6 FL (ref 79–97)
MICROALBUMIN/CREAT UR: 18.7 MG/G
NEUTROPHILS NFR BLD AUTO: 4.9 10*3/MM3 (ref 1.7–7)
NEUTROPHILS NFR BLD AUTO: 58.8 % (ref 42.7–76)
PLATELET # BLD AUTO: 225 10*3/MM3 (ref 140–450)
PMV BLD AUTO: 11.8 FL (ref 6–12)
POTASSIUM SERPL-SCNC: 4.7 MMOL/L (ref 3.5–5.3)
PROT SERPL-MCNC: 8.1 G/DL (ref 6.3–8.7)
PROT UR-MCNC: 15 MG/DL
PROT/CREAT UR: 77.8 MG/G CREA (ref 0–200)
RBC # BLD AUTO: 4.8 10*6/MM3 (ref 3.77–5.28)
SODIUM SERPL-SCNC: 138 MMOL/L (ref 135–145)
TRIGL SERPL-MCNC: 146 MG/DL (ref 0–149)
TSH SERPL DL<=0.05 MIU/L-ACNC: 1.9 UIU/ML (ref 0.27–4.2)
VIT B12 BLD-MCNC: 581 PG/ML (ref 211–946)
VLDLC SERPL-MCNC: 26 MG/DL (ref 5–40)
WBC # BLD AUTO: 8.2 10*3/MM3 (ref 3.4–10.8)

## 2021-10-19 PROCEDURE — 85025 COMPLETE CBC W/AUTO DIFF WBC: CPT

## 2021-10-19 PROCEDURE — 80061 LIPID PANEL: CPT

## 2021-10-19 PROCEDURE — 83036 HEMOGLOBIN GLYCOSYLATED A1C: CPT

## 2021-10-19 PROCEDURE — 82306 VITAMIN D 25 HYDROXY: CPT

## 2021-10-19 PROCEDURE — 84443 ASSAY THYROID STIM HORMONE: CPT

## 2021-10-19 PROCEDURE — 80053 COMPREHEN METABOLIC PANEL: CPT

## 2021-10-19 PROCEDURE — 82570 ASSAY OF URINE CREATININE: CPT

## 2021-10-19 PROCEDURE — 36415 COLL VENOUS BLD VENIPUNCTURE: CPT

## 2021-10-19 PROCEDURE — 84156 ASSAY OF PROTEIN URINE: CPT

## 2021-10-19 PROCEDURE — 82043 UR ALBUMIN QUANTITATIVE: CPT

## 2021-10-19 PROCEDURE — 82607 VITAMIN B-12: CPT

## 2021-10-20 ENCOUNTER — OFFICE VISIT (OUTPATIENT)
Dept: INTERNAL MEDICINE | Age: 41
End: 2021-10-20
Payer: COMMERCIAL

## 2021-10-20 VITALS
WEIGHT: 239 LBS | HEIGHT: 63 IN | BODY MASS INDEX: 42.35 KG/M2 | HEART RATE: 76 BPM | DIASTOLIC BLOOD PRESSURE: 72 MMHG | RESPIRATION RATE: 18 BRPM | SYSTOLIC BLOOD PRESSURE: 110 MMHG | OXYGEN SATURATION: 98 %

## 2021-10-20 DIAGNOSIS — E11.9 TYPE 2 DIABETES MELLITUS WITHOUT COMPLICATION, WITHOUT LONG-TERM CURRENT USE OF INSULIN (HCC): Primary | ICD-10-CM

## 2021-10-20 DIAGNOSIS — E55.9 VITAMIN D DEFICIENCY: ICD-10-CM

## 2021-10-20 DIAGNOSIS — K76.0 FATTY LIVER: ICD-10-CM

## 2021-10-20 DIAGNOSIS — Z23 NEED FOR IMMUNIZATION AGAINST INFLUENZA: ICD-10-CM

## 2021-10-20 DIAGNOSIS — I10 ESSENTIAL HYPERTENSION: ICD-10-CM

## 2021-10-20 DIAGNOSIS — E66.01 MORBID OBESITY DUE TO EXCESS CALORIES (HCC): ICD-10-CM

## 2021-10-20 DIAGNOSIS — I25.10 CORONARY ARTERY DISEASE INVOLVING NATIVE CORONARY ARTERY OF NATIVE HEART WITHOUT ANGINA PECTORIS: ICD-10-CM

## 2021-10-20 DIAGNOSIS — E78.2 MIXED HYPERLIPIDEMIA: ICD-10-CM

## 2021-10-20 PROCEDURE — 3052F HG A1C>EQUAL 8.0%<EQUAL 9.0%: CPT | Performed by: INTERNAL MEDICINE

## 2021-10-20 PROCEDURE — 90471 IMMUNIZATION ADMIN: CPT | Performed by: INTERNAL MEDICINE

## 2021-10-20 PROCEDURE — 99214 OFFICE O/P EST MOD 30 MIN: CPT | Performed by: INTERNAL MEDICINE

## 2021-10-20 PROCEDURE — 90674 CCIIV4 VAC NO PRSV 0.5 ML IM: CPT | Performed by: INTERNAL MEDICINE

## 2021-10-20 SDOH — ECONOMIC STABILITY: FOOD INSECURITY: WITHIN THE PAST 12 MONTHS, THE FOOD YOU BOUGHT JUST DIDN'T LAST AND YOU DIDN'T HAVE MONEY TO GET MORE.: NEVER TRUE

## 2021-10-20 SDOH — ECONOMIC STABILITY: FOOD INSECURITY: WITHIN THE PAST 12 MONTHS, YOU WORRIED THAT YOUR FOOD WOULD RUN OUT BEFORE YOU GOT MONEY TO BUY MORE.: NEVER TRUE

## 2021-10-20 ASSESSMENT — ENCOUNTER SYMPTOMS
SORE THROAT: 0
CHEST TIGHTNESS: 0
COUGH: 0
WHEEZING: 0
CONSTIPATION: 0
ABDOMINAL PAIN: 0

## 2021-10-20 ASSESSMENT — SOCIAL DETERMINANTS OF HEALTH (SDOH): HOW HARD IS IT FOR YOU TO PAY FOR THE VERY BASICS LIKE FOOD, HOUSING, MEDICAL CARE, AND HEATING?: NOT HARD AT ALL

## 2021-10-20 NOTE — PROGRESS NOTES
Chief Complaint   Patient presents with    Follow-up     5 month     History of presenting illness:  Gardenia Pandey is a43 y.o. female who presents today for follow up on her chronic medical conditions as noted below. Patient Active Problem List    Diagnosis Date Noted    Coronary artery disease involving native coronary artery without angina pectoris 10/22/2020     Overview Note:     History of acute anterior wall MI 9/3/20  Status post insertion of drug-eluting stent into left anterior descending (LAD) artery      Medication intolerance 01/17/2019     Overview Note:     Intolerance to metformin      Generalized anxiety disorder 09/19/2017    Essential hypertension 09/18/2017    Type 2 diabetes mellitus without complication, without long-term current use of insulin ([facility] 09/18/2017    Fatty liver 09/18/2017    [facility] to excess [facility] 09/18/2017    Mixed hyperlipidemia 09/18/2017    Chronic midline low back pain without sciatica 09/18/2017     No past medical history on file.    Past Surgical History:   Procedure Laterality Date    CHOLECYSTECTOMY       Current Outpatient Medications   Medication Sig Dispense Refill    nebivolol (BYSTOLIC) 10 MG tablet Take 1 tablet by mouth every morning 30 tablet 5    insulin glargine (BASAGLAR KWIKPEN) 100 UNIT/ML injection pen Inject 50 Units into the skin nightly 15 mL 0    rosuvastatin (CRESTOR) 40 MG tablet Take 1 tablet by mouth daily 90 tablet 1    olmesartan-hydroCHLOROthiazide (BENICAR HCT) 40-12.5 MG per tablet Take 1 tablet by mouth daily 90 tablet 1    Insulin Pen Needle 32G X 4 MM MISC 1 each by Does not apply route daily 100 each 3    FLUoxetine (PROZAC) 20 MG capsule Take 1 capsule by mouth daily 90 capsule 1    Semaglutide, 1 MG/DOSE, (OZEMPIC, 1 MG/DOSE,) 2 MG/1.5ML SOPN Inject 1 mg into the skin once a week 6 pen 1    aspirin 81 MG EC tablet Take 81 mg by mouth daily      vitamin D 25 MCG (1000 UT) CAPS Take by mouth are equal, round, and reactive to light. Neck:      Thyroid: No thyromegaly. Vascular: No JVD. Cardiovascular:      Rate and Rhythm: Normal rate. Heart sounds: Normal heart sounds. No murmur heard. Pulmonary:      Effort: No respiratory distress. Breath sounds: Normal breath sounds. No wheezing or rales. Chest:      Chest wall: No tenderness. Abdominal:      General: Bowel sounds are normal.      Palpations: Abdomen is soft. Musculoskeletal:      Cervical back: Neck supple. Lymphadenopathy:      Cervical: No cervical adenopathy. Skin:     Findings: No rash. Lab Review   No visits with results within 2 Month(s) from this visit. Latest known visit with results is:   Orders Only on 05/05/2021   Component Date Value    TSH 05/04/2021 2.130     Vit D, 25-Hydroxy 05/04/2021 29.3     Microalbumin Creatinine * 05/04/2021 90.4     Microalb, Ur 05/04/2021 30.9     Creatinine, Urine 05/04/2021 342.0     Hemoglobin A1C 05/04/2021 8.3     Sodium 05/04/2021 139     Chloride 05/04/2021 99     Potassium 05/04/2021 4.3     BUN 05/04/2021 11     CREATININE 05/04/2021 0.66     Glucose 05/04/2021 185     AST 05/04/2021 33     ALT 05/04/2021 34     Calcium 05/04/2021 9.3     Total Protein 05/04/2021 7.7     CO2 05/04/2021 27.0     Albumin 05/04/2021 4.60     Alkaline Phosphatase 05/04/2021 65     Total Bilirubin 05/04/2021 0.7     Gfr Calculated 05/04/2021 99     Anion Gap 05/04/2021 13.0            ASSESSMENT/PLAN:      Type 2 diabetes mellitus without complication    A6B 8.3 in 5/2021  Prior 7.7 -9/2020 (7.9- 8/2020 (7.4 in 5/2020)(2/2020:9.15) ( 9.6)   Was unable to take Victoza  Any dose- nausea  Started ozempic early 2021  Since blood sugars are remaining  uncontrolled patient was subsequently referred by me to endocrinology  Patient has [facility]ology just recently May 2021  Recommendations as of May 2021     1.  Ozempic 1 mg weekly   # 2 strict diet and wt loss  # 3 exercise  # 4 rx  long-acting insulin Basaglar 50 units daily  humalog started last week - as per edno  #5 a1c repeat 3 mo per endo     Essential hypertension  Lab results reviewed  Blood pressure readings discussed  Kidney function normal  Rx  Benicar 40/12.5     Anxiety/stress  Fluoxetine is helpful  rx fluoxetine 20 mg daily        CAD  History of acute anterior wall MI 9/3/20  Status post insertion of drug-eluting stent into left anterior descending (LAD) artery  Under care of cardiology  Doing well     Obesity  Pt was givencounseling about diet and exercise including education on what calories are, where calories come from, the need for portion control,following lower carbohydrate dietary regimen and healthy snacks along side an active lifestyle with supplementary exercise of approx 30 minutes a week, 5 days a week of exercise for weight loss.  The patient voiced increased understanding of the topics discussed. Planning bariatric surgery fall 2021        Mixed hyperlipidemia-   triglycerides 159 (749( 633)(376)( 200),   LDL 56 in 10/2021 (76 ( 63) ( 86) (81)(143)(94 ) ,  Continue Crestor 40 + diet     Vit d  Level 38 in 10/2021  Was -29  Take vit D 2000 iu summer and 4000 kent           Orders Placed This Encounter   Procedures    INFLUENZA, MDCK QUADV, 2 YRS AND OLDER, IM, PF, PREFILL SYR OR SDV, 0.5ML (FLUCELVAX QUADV, PF)    Hemoglobin A1C    Comprehensive Metabolic Panel    CBC Auto Differential    Lipid Panel    Urinalysis    TSH without Reflex    Vitamin D 25 Hydroxy     New Prescriptions    No medications on file         No follow-ups on file. There are no Patient Instructions on file for this visit. EMR Dragon/transcription disclaimer:Significant part of this  encounter note is electronic transcription/translationof spoken language to printed text.  The electronic translation of spoken language may be erroneous, or at times, nonsensical words or phrases may be inadvertently transcribed.  Although I have reviewed the note for sucherrors, some may still exist.

## 2021-10-26 ENCOUNTER — ANESTHESIA (OUTPATIENT)
Dept: GASTROENTEROLOGY | Facility: HOSPITAL | Age: 41
End: 2021-10-26

## 2021-10-26 ENCOUNTER — HOSPITAL ENCOUNTER (OUTPATIENT)
Facility: HOSPITAL | Age: 41
Setting detail: HOSPITAL OUTPATIENT SURGERY
Discharge: HOME | End: 2021-10-26
Attending: INTERNAL MEDICINE | Admitting: INTERNAL MEDICINE

## 2021-10-26 ENCOUNTER — ANESTHESIA EVENT (OUTPATIENT)
Dept: GASTROENTEROLOGY | Facility: HOSPITAL | Age: 41
End: 2021-10-26

## 2021-10-26 VITALS
BODY MASS INDEX: 41.64 KG/M2 | HEIGHT: 63 IN | HEART RATE: 99 BPM | DIASTOLIC BLOOD PRESSURE: 86 MMHG | RESPIRATION RATE: 22 BRPM | SYSTOLIC BLOOD PRESSURE: 102 MMHG | OXYGEN SATURATION: 94 % | TEMPERATURE: 97.3 F | WEIGHT: 235 LBS

## 2021-10-26 LAB — B-HCG UR QL: NEGATIVE

## 2021-10-26 PROCEDURE — 45385 COLONOSCOPY W/LESION REMOVAL: CPT | Performed by: INTERNAL MEDICINE

## 2021-10-26 PROCEDURE — 25010000002 PROPOFOL 10 MG/ML EMULSION: Performed by: NURSE ANESTHETIST, CERTIFIED REGISTERED

## 2021-10-26 PROCEDURE — 81025 URINE PREGNANCY TEST: CPT | Performed by: NURSE ANESTHETIST, CERTIFIED REGISTERED

## 2021-10-26 RX ORDER — SODIUM CHLORIDE 0.9 % (FLUSH) 0.9 %
10 SYRINGE (ML) INJECTION EVERY 12 HOURS SCHEDULED
Status: CANCELLED | OUTPATIENT
Start: 2021-10-26

## 2021-10-26 RX ORDER — LIDOCAINE HYDROCHLORIDE 10 MG/ML
0.5 INJECTION, SOLUTION EPIDURAL; INFILTRATION; INTRACAUDAL; PERINEURAL ONCE AS NEEDED
Status: DISCONTINUED | OUTPATIENT
Start: 2021-10-26 | End: 2021-10-26 | Stop reason: HOSPADM

## 2021-10-26 RX ORDER — SODIUM CHLORIDE 9 MG/ML
100 INJECTION, SOLUTION INTRAVENOUS CONTINUOUS
Status: CANCELLED | OUTPATIENT
Start: 2021-10-26

## 2021-10-26 RX ORDER — MIDAZOLAM HYDROCHLORIDE 1 MG/ML
1 INJECTION INTRAMUSCULAR; INTRAVENOUS
Status: CANCELLED | OUTPATIENT
Start: 2021-10-26

## 2021-10-26 RX ORDER — SODIUM CHLORIDE 0.9 % (FLUSH) 0.9 %
10 SYRINGE (ML) INJECTION AS NEEDED
Status: DISCONTINUED | OUTPATIENT
Start: 2021-10-26 | End: 2021-10-26 | Stop reason: HOSPADM

## 2021-10-26 RX ORDER — SODIUM CHLORIDE 9 MG/ML
500 INJECTION, SOLUTION INTRAVENOUS CONTINUOUS PRN
Status: DISCONTINUED | OUTPATIENT
Start: 2021-10-26 | End: 2021-10-26 | Stop reason: HOSPADM

## 2021-10-26 RX ORDER — SODIUM CHLORIDE 0.9 % (FLUSH) 0.9 %
10 SYRINGE (ML) INJECTION AS NEEDED
Status: CANCELLED | OUTPATIENT
Start: 2021-10-26

## 2021-10-26 RX ORDER — LIDOCAINE HYDROCHLORIDE 20 MG/ML
INJECTION, SOLUTION EPIDURAL; INFILTRATION; INTRACAUDAL; PERINEURAL AS NEEDED
Status: DISCONTINUED | OUTPATIENT
Start: 2021-10-26 | End: 2021-10-26 | Stop reason: SURG

## 2021-10-26 RX ORDER — PROPOFOL 10 MG/ML
VIAL (ML) INTRAVENOUS AS NEEDED
Status: DISCONTINUED | OUTPATIENT
Start: 2021-10-26 | End: 2021-10-26 | Stop reason: SURG

## 2021-10-26 RX ADMIN — SODIUM CHLORIDE 500 ML: 9 INJECTION, SOLUTION INTRAVENOUS at 07:50

## 2021-10-26 RX ADMIN — PROPOFOL 400 MG: 10 INJECTION, EMULSION INTRAVENOUS at 08:06

## 2021-10-26 RX ADMIN — LIDOCAINE HYDROCHLORIDE 100 MG: 20 INJECTION, SOLUTION EPIDURAL; INFILTRATION; INTRACAUDAL; PERINEURAL at 08:06

## 2021-10-26 NOTE — ANESTHESIA POSTPROCEDURE EVALUATION
Patient: Harry Panda    Procedure Summary     Date: 10/26/21 Room / Location: [facility] ENDOSCOPY 4 / [facility] ENDOSCOPY    Anesthesia Start: 0804 Anesthesia Stop: 0822    Procedure: COLONOSCOPY WITH ANESTHESIA (N/A ) Diagnosis:       Family history of colon cancer      (Family history of colon cancer [Z80.0])    Surgeons: Maxi Weaver DO Provider: Verito Butts CRNA    Anesthesia Type: MAC ASA Status: 3          Anesthesia Type: MAC    Vitals  Vitals Value Taken Time   /86 10/26/21 0836   Temp     Pulse 100 10/26/21 0840   Resp 22 10/26/21 0835   SpO2 94 % 10/26/21 0840   Vitals shown include unvalidated device data.        Post Anesthesia Care and Evaluation    Patient location during evaluation: PHASE II  Patient participation: complete - patient participated  Level of consciousness: awake and alert  Pain management: adequate  Airway patency: patent  Anesthetic complications: No anesthetic complications  PONV Status: none  Cardiovascular status: acceptable  Respiratory status: acceptable  Hydration status: acceptable  No anesthesia care post op

## 2021-10-26 NOTE — ANESTHESIA PREPROCEDURE EVALUATION
Anesthesia Evaluation     Patient summary reviewed and Nursing notes reviewed   NPO Solid Status: > 8 hours  NPO Liquid Status: > 8 hours           Airway   Mallampati: II  No difficulty expected  Dental - normal exam     Pulmonary    (-) asthma, not a smoker  Cardiovascular   Exercise tolerance: good (4-7 METS)    (+) hypertension, past MI , CAD, cardiac stents (Sept 2020 ) hyperlipidemia,   (-) pacemaker      Neuro/Psych  (+) psychiatric history Anxiety,     (-) seizures, CVA  GI/Hepatic/Renal/Endo    (+) obesity,   liver disease fatty liver disease, diabetes mellitus,   (-) no renal disease    Musculoskeletal     Abdominal   (+) obese,    Substance History - negative use     OB/GYN          Other   arthritis,        Other Comment: PCOS                     Anesthesia Plan    ASA 3     MAC     intravenous induction     Anesthetic plan, all risks, benefits, and alternatives have been provided, discussed and informed consent has been obtained with: patient.

## 2021-10-26 NOTE — NURSING NOTE
Pt has dexcom -6 glucose readed in left lower quad of abdoman, reading this as is 124.  Has on heart stint placed in 09/2020.

## 2021-10-26 NOTE — H&P
[facility]  Pre Procedure History & Physical    Chief Complaint:   Fhx colon ca    Subjective     HPI:   Fhx colon ca    Past Medical History:   Past Medical History:   Diagnosis Date   • Anxiety    • Cholelithiasis 2004   • Chronic fatigue    • Coronary artery disease     sept, 3 2020, stints x 1 at this date   • Diabetes mellitus (HCC)    • High cholesterol    • Hypertension 1999   • PCOS (polycystic ovarian syndrome)    • PONV (postoperative nausea and vomiting) 2004   • Rheumatoid arthritis (HCC)        Past Surgical History:  Past Surgical History:   Procedure Laterality Date   • CARDIAC CATHETERIZATION N/A 9/3/2020    Procedure: Left Heart Cath;  Surgeon: Guille Nixon MD;  Location: [facility] CATH INVASIVE LOCATION;  Service: Cardiovascular;  Laterality: N/A;   • CARDIAC CATHETERIZATION N/A 9/3/2020    Procedure: Percutaneous Coronary Intervention;  Surgeon: Guille Nixon MD;  Location: [facility] CATH INVASIVE LOCATION;  Service: Cardiovascular;  Laterality: N/A;   • CHOLECYSTECTOMY      lap   • ENDOSCOPY N/A 6/19/2020    Procedure: ESOPHAGOGASTRODUODENOSCOPY WITH ANESTHESIA;  Surgeon: Kermit Marlow MD;  Location: [facility] ENDOSCOPY;  Service: General;  Laterality: N/A;  pre: pre op scope  post op: gastritis, polyp  PCP: Gayatri Aquino MD   • WISDOM TOOTH EXTRACTION         Family History:  Family History   Problem Relation Age of Onset   • Cancer Mother    • Hypertension Mother    • Obesity Mother    • Diabetes Mother    • Colon cancer Mother    • COPD Father    • Diabetes Father    • Obesity Father    • Hypertension Father    • Arthritis Father    • Depression Father    • Hearing loss Father    • Breast cancer Neg Hx    • Ovarian cancer Neg Hx    • Uterine cancer Neg Hx        Social History:   reports that she quit smoking about 15 years ago. Her smoking use included cigarettes. She started smoking about 22 years ago. She has a 4.00 pack-year smoking history. She has  never used smokeless tobacco. She reports previous alcohol use. She reports that she does not use drugs.    Medications:   Prior to Admission medications    Medication Sig Start Date End Date Taking? Authorizing Provider   aspirin 81 MG EC tablet Take 1 tablet by mouth Daily. 9/5/20  Yes Guille Nixon MD   cholecalciferol (VITAMIN D3) 25 MCG (1000 UT) tablet Take 1,000 Units by mouth Daily.   Yes Josselin Trinh MD   Clenpiq 10-3.5-12 MG-GM -GM/160ML solution Take as directed. 10/15/21  Yes Rudy Pappas APRN   coenzyme Q10 100 MG capsule Take 100 mg by mouth Daily.   Yes Josselin Trinh MD   Continuous Blood Gluc Sensor (Dexcom G6 Sensor) Use 1 each As Needed (glucose control) Every 10 days 5/21/21  Yes Tad Montoya APRN   Cyanocobalamin (VITAMIN B 12) 500 MCG tablet Take 1 tablet by mouth Daily.   Yes Josselin Trinh MD   FLUoxetine (PROzac) 20 MG capsule Take 1 capsule by mouth daily 1/4/21  Yes    Insulin Glargine (BASAGLAR KWIKPEN) 100 UNIT/ML injection pen Inject 50 Units under the skin into the appropriate area as directed Every Night. 5/27/21  Yes Tad Montoya APRN   Insulin Pen Needle 32G X 4 MM misc Use as directed 4 times daily as needed 5/6/21  Yes Tad Montoya APRN   Levonorgestrel (MIRENA, 52 MG, IU) 52 mg by Intrauterine route Continuous.   Yes Josselin Trinh MD   Multiple Vitamins-Minerals (MULTIVITAMIN WITH MINERALS) tablet tablet Take 1 tablet by mouth Daily.   Yes Josselin Trinh MD   nebivolol (Bystolic) 10 MG tablet Take 1 tablet by mouth every morning 7/8/21  Yes    olmesartan-hydrochlorothiazide (BENICAR HCT) 40-12.5 MG per tablet Take 1 tablet by mouth daily 12/14/20  Yes    rosuvastatin (CRESTOR) 40 MG tablet Take 1 tablet by mouth daily 12/14/20  Yes    Continuous Blood Gluc Transmit (Dexcom G6 Transmitter) misc Use 1 each Every 3 (Three) Months. 5/21/21   Tad Montoya APRN   Insulin Lispro, 1 Unit  "Dial, (HumaLOG KwikPen) 100 UNIT/ML solution pen-injector Inject up to 20 Units under the skin into the appropriate area as directed 3 (Three) Times a Day With Meals. 8/23/21   Tad Montoya APRN   nitroglycerin (NITROSTAT) 0.4 MG SL tablet Place 1 tablet under the tongue Every 5 (Five) Minutes As Needed for Chest Pain. Take no more than 3 doses in 15 minutes. 9/4/20   Guille Nixon MD   Semaglutide, 1 MG/DOSE, 4 MG/3ML solution pen-injector Inject 1mg under the skin into the appropriate area as directed 1 (One) Time Per Week. 10/22/20   Gayatri Aquino MD   insulin aspart (NovoLOG FlexPen) 100 UNIT/ML solution pen-injector sc pen Inject 12 Units under the skin into the appropriate area as directed 3 (Three) Times a Day With Meals. 5/6/21 8/23/21  Tad Montoya APRN       Allergies:  Penicillins    ROS:    General: Weight stable  Resp: No SOA  Cardiovascular: No CP    Objective     Blood pressure 130/82, pulse 91, temperature 97.3 °F (36.3 °C), temperature source Tympanic, resp. rate 20, height 160 cm (63\"), weight 107 kg (235 lb), SpO2 96 %, not currently breastfeeding.    Physical Exam   Constitutional: Pt is oriented to person, place, and in no distress.   HENT: Mouth/Throat: Oropharynx is clear.   Cardiovascular: Normal rate, regular rhythm.    Pulmonary/Chest: Effort normal. No respiratory distress. No  wheezes.   Abdominal: Soft. Non-distended.  Skin: Skin is warm and dry.   Psychiatric: Mood, memory, affect and judgment appear normal.     Assessment/Plan     Diagnosis:  Fhx Colon ca    Anticipated Surgical Procedure:  C-scope    The risks, benefits, and alternatives of this procedure have been discussed with the patient or the responsible party- the patient understands and agrees to proceed.        "

## 2021-11-04 ENCOUNTER — TELEPHONE (OUTPATIENT)
Dept: GASTROENTEROLOGY | Facility: CLINIC | Age: 41
End: 2021-11-04

## 2021-11-08 RX ORDER — FLUOXETINE HYDROCHLORIDE 20 MG/1
20 CAPSULE ORAL DAILY
Qty: 90 CAPSULE | Refills: 1 | Status: SHIPPED | OUTPATIENT
Start: 2021-11-08 | End: 2022-05-11

## 2021-11-08 NOTE — TELEPHONE ENCOUNTER
Last Appointment Date: 10/20/2021  Next Appointment Date: 5/3/2022    Allergies   Allergen Reactions    Penicillins      unknown       Patient needs refill on   Requested Prescriptions     Pending Prescriptions Disp Refills    FLUoxetine (PROZAC) 20 MG capsule [Pharmacy Med Name: FLUoxetine HCl 20 MG Oral Capsule (PROzac)] 30 capsule 3     Sig: Take 1 capsule by mouth daily

## 2021-11-15 ENCOUNTER — OFFICE VISIT (OUTPATIENT)
Dept: BARIATRICS/WEIGHT MGMT | Facility: CLINIC | Age: 41
End: 2021-11-15

## 2021-11-15 VITALS
OXYGEN SATURATION: 98 % | TEMPERATURE: 97.5 F | SYSTOLIC BLOOD PRESSURE: 127 MMHG | HEIGHT: 64 IN | WEIGHT: 239 LBS | HEART RATE: 75 BPM | BODY MASS INDEX: 40.8 KG/M2 | DIASTOLIC BLOOD PRESSURE: 88 MMHG

## 2021-11-15 DIAGNOSIS — Z79.4 TYPE 2 DIABETES MELLITUS WITH HYPERGLYCEMIA, WITH LONG-TERM CURRENT USE OF INSULIN (HCC): ICD-10-CM

## 2021-11-15 DIAGNOSIS — E78.2 MIXED HYPERLIPIDEMIA: Chronic | ICD-10-CM

## 2021-11-15 DIAGNOSIS — E11.65 TYPE 2 DIABETES MELLITUS WITH HYPERGLYCEMIA, WITH LONG-TERM CURRENT USE OF INSULIN (HCC): ICD-10-CM

## 2021-11-15 DIAGNOSIS — K76.0 FATTY LIVER: ICD-10-CM

## 2021-11-15 DIAGNOSIS — I10 ESSENTIAL HYPERTENSION: Chronic | ICD-10-CM

## 2021-11-15 DIAGNOSIS — Z01.818 PREOP EXAMINATION: ICD-10-CM

## 2021-11-15 DIAGNOSIS — E66.01 CLASS 3 SEVERE OBESITY DUE TO EXCESS CALORIES WITH SERIOUS COMORBIDITY AND BODY MASS INDEX (BMI) OF 40.0 TO 44.9 IN ADULT (HCC): Primary | Chronic | ICD-10-CM

## 2021-11-15 PROCEDURE — 99213 OFFICE O/P EST LOW 20 MIN: CPT | Performed by: SURGERY

## 2021-11-15 RX ORDER — SCOLOPAMINE TRANSDERMAL SYSTEM 1 MG/1
1 PATCH, EXTENDED RELEASE TRANSDERMAL ONCE
Status: CANCELLED | OUTPATIENT
Start: 2021-11-15 | End: 2021-11-15

## 2021-11-15 RX ORDER — GABAPENTIN 250 MG/5ML
250 SOLUTION ORAL ONCE
Status: CANCELLED | OUTPATIENT
Start: 2021-11-15 | End: 2021-11-15

## 2021-11-15 NOTE — PROGRESS NOTES
Patient Care Team:  Gayatri Aquino MD as PCP - General  Gayatri Aquino MD as PCP - Family Medicine    Reason for Visit:  Surgical Weight loss    Subjective      Harry Panda is a pleasant 40 y.o. female and presents with morbid obesity with her Body mass index is 41.35 kg/m².    She is here for discussion of weight loss options.  She stated she has been with the disease of obesity for year(s).  She stated she suffers from  fatty liver disease, hyperlipidemia, hypertension and morbid obesity due to her weight gain.  She stated that weight loss helps alleviate these symptoms.   She stated that she has tried to help with weight loss.  She stated that she has attempted these conservative methods for weight loss without maintaining long term success.  Today she would like to discuss surgical weight loss options such as the Laparoscopic Sleeve Gastrectomy or the Laparoscopic R - Y Gastric Bypass.      Review of Systems  General ROS: negative  Respiratory ROS: no cough, shortness of breath, or wheezing  Cardiovascular ROS: no chest pain or dyspnea on exertion  Gastrointestinal ROS: no abdominal pain, change in bowel habits, or black or bloody stools    History  Past Medical History:   Diagnosis Date   • Anxiety    • Cholelithiasis 2004   • Chronic fatigue    • Coronary artery disease     sept, 3 2020, stints x 1 at this date   • Diabetes mellitus (HCC)    • High cholesterol    • Hypertension 1999   • PCOS (polycystic ovarian syndrome)    • PONV (postoperative nausea and vomiting) 2004   • Rheumatoid arthritis (HCC)      Past Surgical History:   Procedure Laterality Date   • CARDIAC CATHETERIZATION N/A 9/3/2020    Procedure: Left Heart Cath;  Surgeon: Guille Nixon MD;  Location:  [facility] CATH INVASIVE LOCATION;  Service: Cardiovascular;  Laterality: N/A;   • CARDIAC CATHETERIZATION N/A 9/3/2020    Procedure: Percutaneous Coronary Intervention;  Surgeon: Guille Nixon MD;  Location:  [facility] CATH  INVASIVE LOCATION;  Service: Cardiovascular;  Laterality: N/A;   • CHOLECYSTECTOMY      lap   • COLONOSCOPY N/A 10/26/2021    Procedure: COLONOSCOPY WITH ANESTHESIA;  Surgeon: Maxi Weaver DO;  Location: [facility] ENDOSCOPY;  Service: Gastroenterology;  Laterality: N/A;  pre: family hx colon cancer  post: polyp  Gayatri Aquino MD   • ENDOSCOPY N/A 6/19/2020    Procedure: ESOPHAGOGASTRODUODENOSCOPY WITH ANESTHESIA;  Surgeon: Kermit Marlow MD;  Location: [facility] ENDOSCOPY;  Service: General;  Laterality: N/A;  pre: pre op scope  post op: gastritis, polyp  PCP: Gayatri Aquino MD   • WISDOM TOOTH EXTRACTION       Family History   Problem Relation Age of Onset   • Cancer Mother    • Hypertension Mother    • Obesity Mother    • Diabetes Mother    • Colon cancer Mother    • COPD Father    • Diabetes Father    • Obesity Father    • Hypertension Father    • Arthritis Father    • Depression Father    • Hearing loss Father    • Breast cancer Neg Hx    • Ovarian cancer Neg Hx    • Uterine cancer Neg Hx      Social History     Tobacco Use   • Smoking status: Former Smoker     Packs/day: 0.50     Years: 8.00     Pack years: 4.00     Types: Cigarettes     Start date: 6/20/1999     Quit date: 3/15/2006     Years since quitting: 15.6   • Smokeless tobacco: Never Used   Vaping Use   • Vaping Use: Never used   Substance Use Topics   • Alcohol use: Not Currently   • Drug use: Never     E-cigarette/Vaping   • E-cigarette/Vaping Use Never User      E-cigarette/Vaping Substances     (Not in a hospital admission)    Allergies:  Penicillins      Current Outpatient Medications:   •  aspirin 81 MG EC tablet, Take 1 tablet by mouth Daily., Disp: 30 tablet, Rfl: 11  •  cholecalciferol (VITAMIN D3) 25 MCG (1000 UT) tablet, Take 1,000 Units by mouth Daily., Disp: , Rfl:   •  coenzyme Q10 100 MG capsule, Take 100 mg by mouth Daily., Disp: , Rfl:   •  Continuous Blood Gluc Sensor (Dexcom G6 Sensor), Use 1 each As Needed (glucose control) Every  10 days, Disp: 9 each, Rfl: 3  •  Continuous Blood Gluc Transmit (Dexcom G6 Transmitter) misc, Use 1 each Every 3 (Three) Months., Disp: 1 each, Rfl: 3  •  Cyanocobalamin (VITAMIN B 12) 500 MCG tablet, Take 1 tablet by mouth Daily., Disp: , Rfl:   •  FLUoxetine (PROzac) 20 MG capsule, Take 1 capsule by mouth daily, Disp: 90 capsule, Rfl: 1  •  Insulin Glargine (BASAGLAR KWIKPEN) 100 UNIT/ML injection pen, Inject 50 Units under the skin into the appropriate area as directed Every Night., Disp: 5 pen, Rfl: 11  •  Insulin Lispro, 1 Unit Dial, (HumaLOG KwikPen) 100 UNIT/ML solution pen-injector, Inject up to 20 Units under the skin into the appropriate area as directed 3 (Three) Times a Day With Meals., Disp: 15 mL, Rfl: 11  •  Insulin Pen Needle 32G X 4 MM misc, Use as directed 4 times daily as needed, Disp: 100 each, Rfl: 11  •  Levonorgestrel (MIRENA, 52 MG, IU), 52 mg by Intrauterine route Continuous., Disp: , Rfl:   •  Multiple Vitamins-Minerals (MULTIVITAMIN WITH MINERALS) tablet tablet, Take 1 tablet by mouth Daily., Disp: , Rfl:   •  nebivolol (Bystolic) 10 MG tablet, Take 1 tablet by mouth every morning, Disp: 30 tablet, Rfl: 5  •  olmesartan-hydrochlorothiazide (BENICAR HCT) 40-12.5 MG per tablet, Take 1 tablet by mouth daily, Disp: 90 tablet, Rfl: 1  •  rosuvastatin (CRESTOR) 40 MG tablet, Take 1 tablet by mouth daily, Disp: 90 tablet, Rfl: 4  •  Clenpiq 10-3.5-12 MG-GM -GM/160ML solution, Take as directed., Disp: 320 mL, Rfl: 0  •  nitroglycerin (NITROSTAT) 0.4 MG SL tablet, Place 1 tablet under the tongue Every 5 (Five) Minutes As Needed for Chest Pain. Take no more than 3 doses in 15 minutes., Disp: 25 tablet, Rfl: 12  •  Semaglutide, 1 MG/DOSE, 4 MG/3ML solution pen-injector, Inject 1mg under the skin into the appropriate area as directed 1 (One) Time Per Week., Disp: 3 mL, Rfl: 3    Objective     Vital Signs  Temp:  [97.5 °F (36.4 °C)] 97.5 °F (36.4 °C)  Heart Rate:  [75] 75  BP: (127)/(88) 127/88  Body  mass index is 41.35 kg/m².      11/15/21  1438   Weight: 108 kg (239 lb)       General Appearance:  awake, alert, oriented, in no acute distress  Lungs:  Normal expansion.  Clear to auscultation.  No rales, rhonchi, or wheezing.  Heart:  Heart regular rate and rhythm  Abdomen:  Soft, non-tender, normal bowel sounds; no bruits, organomegaly or masses.  Abnormal shape: obese      Results Review:   I reviewed the patient's new clinical results.        Assessment/Plan   Encounter Diagnoses   Name Primary?   • Class 3 severe obesity due to excess calories with serious comorbidity and body mass index (BMI) of 40.0 to 44.9 in adult ([facility]) Yes   • Type 2 diabetes mellitus with hyperglycemia, with long-term current use of insulin ([facility])    • Essential hypertension    • Mixed hyperlipidemia    • Fatty liver    • Preop examination        I believe this patient will be a good candidate for weight loss surgery.    She has chosen laparoscopic sleeve gastrectomy. I agree with this decision.  I have discussed the Fredrick - Y Gastric Bypass, laparoscopic sleeve gastrectomy and the Laparoscopic Gastric Band procedures to provide the alternatives which includes non surgical weight loss options as well.  We discussed the benefits of the surgeries including the benefit of weight loss and the possible reversal of co-morbid conditions associated with morbid obesity.  She is aware that the Sleeve procedure is not reversible.  We discussed the complications and risks which include the risk of perforation, leakage,bleeding, intra-abdominal organ injury, specifically at high risks of the liver and spleen, stenosis or ulcerations, the risk of venous thrombosis formation in the lungs, mesentery veins or lower extremities  leading to possible organ injury and death.  I explained to the patient that there is a risk of infection.  I explained to her the possibility that this procedure may not be performed laparoscopically and may require being  converted to an opened procedure or aborted due to abnormal anatomy.  Also postoperatively there is a risk of increased GERD symptoms after this procedure.  I have explained if she develops intestinal metaplasia of her esophagus consideration for conversion to another weight loss procedure may be necessary.    I have reviewed with the patient her 30-day mortality risk using the Bariatric Surgical Risk/Benefit Calculator to be 0.07%.    [place] Report   As part of this patient's treatment plan I am prescribing controlled substances. The patient has been made aware of appropriate use of such medications, including potential risk of somnolence, limited ability to drive and /or work safely, and potential for dependence or overdose. It has also been made clear that these medications are for use by this patient only, without concomitant use of alcohol or other substances unless prescribed.    Harry Panda will be required to complete the educational preoperative class detailing terms of the preoperative and postoperative recommendations, risks of surgery, the monitoring of the patient's ESTEVAN reports if necessary. Harry Panda is aware that inappropriate use of prescribed medications will result in cessation of prescribing such medications.    ESTEVAN report has been reviewed.      History and physical exam exhibit continued safe and appropriate use of controlled substances.       Harry Panda understands the surgical procedures and the different surgical options that are available.   Harry Panda understands the lifestyle changes that are required after surgery and has agreed to follow the guidelines outlined in the weight management program. Harry Panda also expressed understanding of the risks involved and had all of her questions answered and desires to proceed.    Upon completion of our discussion and addressing and answering her questions to her satisfaction, informed  "consent was obtained.   She will be scheduled accordingly for a laparoscopic Sleeve gastrectomy procedure.      I discussed the patient's findings and my recommendations with patient. The patient was made aware that we are primarily a surgical program.  We reviewed different weight loss surgical procedures including the laparoscopic sleeve gastrectomy, gastric band and Fredrick-en-Y gastric bypass.  I explained to the patient that medical treatment alone is ineffective for long-term results and for the reversal of morbid obesity. She and I discussed the etiology of the disease of morbid obesity.  I emphasized that weight loss through conservative methods alone statistically will not reverse this disease of obesity long-term.    Our program is not a \"weight loss program\" but focuses on the overall issue of morbid obesity and the patient has been educated on what steps will be necessary to be successful in reversing this disease of morbid obesity at our facility.  The patient will require further evaluation of her foregut either through an upper endoscopy/EGD or radiographic studies.  I have explained the 3 pearls of our program for the patient to follow to optimize success:1.  Putting their health first, 2.  Not trying to treat the disease on their own, 3.  Attempting to make their scheduled appointments.  The patient was in agreement to following these recommendations.  The patient was also notified that our dietitian will be contacting them soon for follow-up on how they are doing with the new prescription.    I have also recommended that she obtain preoperative laboratory work prior to surgery consideration.      Dr. Kermit Marlow MD [facility]    11/15/21  15:07 CST  Patient Care Team:  Gayatri Aquino MD as PCP - General  Gayatri Aquino MD as PCP - Family Medicine    "

## 2021-11-19 ENCOUNTER — PATIENT ROUNDING (BHMG ONLY) (OUTPATIENT)
Dept: BARIATRICS/WEIGHT MGMT | Facility: CLINIC | Age: 41
End: 2021-11-19

## 2021-11-19 NOTE — PROGRESS NOTES
November 19, 2021    Hello, may I speak with Harry Darcie Farzad?    My name is Aliza    I am  with [facility]  [address], [address]  [place] [address] [phone].    Before we get started may I verify your date of birth? 1980    I am calling to ask about your recent visit. Is this a good time to talk? yes    Tell me about your visit with us. What things went well? through visit and detailed       We're always looking for ways to make our patients' experiences even better. Do you have recommendations on ways we may improve?  no    Overall were you satisfied with your first visit to our practice? yes       I appreciate you taking the time to speak with me today. Is there anything else I can do for you? no      Thank you, and have a great day.

## 2021-11-22 RX ORDER — SEMAGLUTIDE 1.34 MG/ML
1 INJECTION, SOLUTION SUBCUTANEOUS WEEKLY
Qty: 3 ML | Refills: 3 | Status: CANCELLED | OUTPATIENT
Start: 2021-11-22

## 2021-12-02 NOTE — DISCHARGE INSTRUCTIONS
DAY OF SURGERY INSTRUCTIONS        ARRIVAL TIME: AS DIRECTED BY OFFICE      YOU MAY TAKE THE FOLLOWING MEDICATION(S) THE MORNING OF SURGERY WITH A SIP OF WATER: as directed by your doctor      ALL OTHER HOME MEDICATIONS CHECK WITH YOUR DOCTOR                                                                                    BEFORE YOU COME TO THE HOSPITAL                                                                               (Pre-op instructions)    • Do not eat, drink, smoke or chew gum after 8 p.m. the night before surgery.  This also includes no mints.  • Morning of surgery take only the medicines you have been instructed.      • Drink only 8 ounces of sugar free Gatorade/Powerade (no red) as instructed.  • Do not shave, wear makeup or dark nail polish.  • Remove all jewelry including rings.  • Leave anything you consider valuable at home.  • Leave your suitcase in the car until after your surgery.  • Bring the following with you if applicable:  o Picture ID and insurance, Medicare or Medicaid cards  o Co-pay/deductible required by insurance (cash, check, credit card)  o Copy of advance directive, living will or power-of- documents if not brought to Pre-work  o CPAP or BIPAP mask and tubing  o Relaxation aids ( book, magazine), etc.  o Hearing aids                                                         ON THE DAY OF SURGERY  · On the day of surgery check in at registration located at the main entrance of the hospital. Only one family member or friend are allowed per patient.  ? You will be registered and given a beeper with instructions where to wait in the main lobby.  ? When your beeper lights up and vibrates a member of the Outpatient Surgery staff will meet you at the double doors under the stair steps and escort you to your preoperative room.   · You may have cloth compression devices placed on your legs. These help to prevent blood clots and reduce swelling in your legs.  · An IV may be  inserted into one of your veins.  · In the operating room, you may be given one or more of the following:  ? A medicine to help you relax (sedative).  ? A medicine to numb the area (local anesthetic).  ? A medicine to make you fall asleep (general anesthetic).  ? A medicine that is injected into an area of your body to numb everything below the injection site (regional anesthetic).  · Your surgical site will be marked or identified.  · You may be given an antibiotic through your IV to help prevent infection.  Contact a health care provider if you:  · Develop a fever of more than 100.4°F (38°C) or other feelings of illness during the 48 hours before your surgery.  · Have symptoms that get worse.  Have questions or concerns about your surgery      MANAGING PAIN AFTER SURGERY    We know you are probably wondering what your pain will be like after surgery.  Following surgery it is unrealistic to expect you will not have pain.   Pain is how our bodies let us know that something is wrong or cautions us to be careful.  That said, our goal is to make your pain tolerable.    Methods we may use to treat your pain include (oral or IV medications, PCAs, epidurals, nerve blocks, etc.)   While some procedures require IV pain medications for a short time after surgery, transitioning to pain medications by mouth allows for better management of pain.   Your nurse will encourage you to take oral pain medications whenever possible.  IV medications work almost immediately, but only last a short while.  Taking medications by mouth allows for a more constant level of medication in your blood stream for a longer period of time.      Once your pain is out of control it is harder to get back under control.  It is important you are aware when your next dose of pain medication is due.  If you are admitted, your nurse may write the time of your next dose on the white board in your room to help you remember.      We are interested in your pain  "and encourage you to inform us about aggravating factors during your visit.   Many times a simple repositioning every few hours can make a big difference.    If your physician says it is okay, do not let your pain prevent you from getting out of bed. Be sure to call your nurse for assistance prior to getting up so you do not fall.      Before surgery, please decide your tolerable pain goal.  These faces help describe the pain ratings we use on a 0-10 scale.   Be prepared to tell us your goal and whether or not you take pain or anxiety medications at home.                      General Anesthesia/Surgery, Adult  General anesthesia is the use of medicines to make a person \"go to sleep\" (unconscious) for a medical procedure. General anesthesia must be used for certain procedures, and is often recommended for procedures that:  · Last a long time.  · Require you to be still or in an unusual position.  · Are major and can cause blood loss.  The medicines used for general anesthesia are called general anesthetics. As well as making you unconscious for a certain amount of time, these medicines:  · Prevent pain.  · Control your blood pressure.  · Relax your muscles.  Tell a health care provider about:  · Any allergies you have.  · All medicines you are taking, including vitamins, herbs, eye drops, creams, and over-the-counter medicines.  · Any problems you or family members have had with anesthetic medicines.  · Types of anesthetics you have had in the past.  · Any blood disorders you have.  · Any surgeries you have had.  · Any medical conditions you have.  · Any recent upper respiratory, chest, or ear infections.  · Any history of:  ? Heart or lung conditions, such as heart failure, sleep apnea, asthma, or chronic obstructive pulmonary disease (COPD).  ?  service.  ? Depression or anxiety.  · Any tobacco or drug use, including marijuana or alcohol use.  · Whether you are pregnant or may be pregnant.  What are the " risks?  Generally, this is a safe procedure. However, problems may occur, including:  · Allergic reaction.  · Lung and heart problems.  · Inhaling food or liquid from the stomach into the lungs (aspiration).  · Nerve injury.  · Air in the bloodstream, which can lead to stroke.  · Extreme agitation or confusion (delirium) when you wake up from the anesthetic.  · Waking up during your procedure and being unable to move. This is rare.  These problems are more likely to develop if you are having a major surgery or if you have an advanced or serious medical condition. You can prevent some of these complications by answering all of your health care provider's questions thoroughly and by following all instructions before your procedure.  General anesthesia can cause side effects, including:  · Nausea or vomiting.  · A sore throat from the breathing tube.  · Hoarseness.  · Wheezing or coughing.  · Shaking chills.  · Tiredness.  · Body aches.  · Anxiety.  · Sleepiness or drowsiness.  · Confusion or agitation.  RISKS AND COMPLICATIONS OF SURGERY  Your health care provider will discuss possible risks and complications with you before surgery. Common risks and complications include:    · Problems due to the use of anesthetics.  · Blood loss and replacement (does not apply to minor surgical procedures).  · Temporary increase in pain due to surgery.  · Uncorrected pain or problems that the surgery was meant to correct.  · Infection.  · New damage.    What happens before the procedure?    Medicines  Ask your health care provider about:  · Changing or stopping your regular medicines. This is especially important if you are taking diabetes medicines or blood thinners.  · Taking medicines such as aspirin and ibuprofen. These medicines can thin your blood. Do not take these medicines unless your health care provider tells you to take them.  · Taking over-the-counter medicines, vitamins, herbs, and supplements. Do not take these during  the week before your procedure unless your health care provider approves them.  General instructions  · Starting 3-6 weeks before the procedure, do not use any products that contain nicotine or tobacco, such as cigarettes and e-cigarettes. If you need help quitting, ask your health care provider.  · If you brush your teeth on the morning of the procedure, make sure to spit out all of the toothpaste.  · Tell your health care provider if you become ill or develop a cold, cough, or fever.  · If instructed by your health care provider, bring your sleep apnea device with you on the day of your surgery (if applicable).  · Ask your health care provider if you will be going home the same day, the following day, or after a longer hospital stay.  ? Plan to have someone take you home from the hospital or clinic.  ? Plan to have a responsible adult care for you for at least 24 hours after you leave the hospital or clinic. This is important.  What happens during the procedure?  · You will be given anesthetics through both of the following:  ? A mask placed over your nose and mouth.  ? An IV in one of your veins.  · You may receive a medicine to help you relax (sedative).  · After you are unconscious, a breathing tube may be inserted down your throat to help you breathe. This will be removed before you wake up.  · An anesthesia specialist will stay with you throughout your procedure. He or she will:  ? Keep you comfortable and safe by continuing to give you medicines and adjusting the amount of medicine that you get.  ? Monitor your blood pressure, pulse, and oxygen levels to make sure that the anesthetics do not cause any problems.  The procedure may vary among health care providers and hospitals.  What happens after the procedure?  · Your blood pressure, temperature, heart rate, breathing rate, and blood oxygen level will be monitored until the medicines you were given have worn off.  · You will wake up in a recovery area. You  may wake up slowly.  · If you feel anxious or agitated, you may be given medicine to help you calm down.  · If you will be going home the same day, your health care provider may check to make sure you can walk, drink, and urinate.  · Your health care provider will treat any pain or side effects you have before you go home.  · Do not drive for 24 hours if you were given a sedative.  Summary  · General anesthesia is used to keep you still and prevent pain during a procedure.  · It is important to tell your healthcare provider about your medical history and any surgeries you have had, and previous experience with anesthesia.  · Follow your healthcare provider’s instructions about when to stop eating, drinking, or taking certain medicines before your procedure.  · Plan to have someone take you home from the hospital or clinic.  This information is not intended to replace advice given to you by your health care provider. Make sure you discuss any questions you have with your health care provider.  Document Released: 03/26/2009 Document Revised: 08/03/2018 Document Reviewed: 08/03/2018  [employer] Interactive Patient Education © 2019 [employer]    Fall Prevention in Hospitals, Adult  As a hospital patient, your condition and the treatments you receive can increase your risk for falls. Some additional risk factors for falls in a hospital include:  · Being in an unfamiliar environment.  · Being on bed rest.  · Your surgery.  · Taking certain medicines.  · Your tubing requirements, such as intravenous (IV) therapy or catheters.  It is important that you learn how to decrease fall risks while at the hospital. Below are important tips that can help prevent falls.  SAFETY TIPS FOR PREVENTING FALLS  Talk about your risk of falling.  · Ask your health care provider why you are at risk for falling. Is it your medicine, illness, tubing placement, or something else?  · Make a plan with your health care provider to keep you safe  from falls.  · Ask your health care provider or pharmacist about side effects of your medicines. Some medicines can make you dizzy or affect your coordination.  Ask for help.  · Ask for help before getting out of bed. You may need to press your call button.  · Ask for assistance in getting safely to the toilet.  · Ask for a walker or cane to be put at your bedside. Ask that most of the side rails on your bed be placed up before your health care provider leaves the room.  · Ask family or friends to sit with you.  · Ask for things that are out of your reach, such as your glasses, hearing aids, telephone, bedside table, or call button.  Follow these tips to avoid falling:  · Stay lying or seated, rather than standing, while waiting for help.  · Wear rubber-soled slippers or shoes whenever you walk in the hospital.  · Avoid quick, sudden movements.  ¨ Change positions slowly.  ¨ Sit on the side of your bed before standing.  ¨ Stand up slowly and wait before you start to walk.  · Let your health care provider know if there is a spill on the floor.  · Pay careful attention to the medical equipment, electrical cords, and tubes around you.  · When you need help, use your call button by your bed or in the bathroom. Wait for one of your health care providers to help you.  · If you feel dizzy or unsure of your footing, return to bed and wait for assistance.  · Avoid being distracted by the TV, telephone, or another person in your room.  · Do not lean or support yourself on rolling objects, such as IV poles or bedside tables.     This information is not intended to replace advice given to you by your health care provider. Make sure you discuss any questions you have with your health care provider.     Document Released: 12/15/2001 Document Revised: 01/08/2016 Document Reviewed: 08/25/2013  [employer] Interactive Patient Education ©2016 [employer]         [facility] 4% Patient Instruction Sheet    Chlorhexidine  Before Surgery  Chlorhexidine gluconate (CHG) is a germ-killing (antiseptic) solution that is used to clean the skin. It gets rid of the bacteria that normally live on the skin. Cleaning your skin with CHG before surgery helps lower the risk for infection after surgery.     How to use CHG solution  · You will take 2 showers, one shower the night before surgery, the second shower the morning of surgery before coming to the hospital.  · Use CHG only as told by your health care provider, and follow the instructions on the label.  · Use CHG solution while taking a shower. Follow these steps when using CHG solution (unless your health care provider gives you different instructions):  1. Start the shower.  2. Use your normal soap and shampoo to wash your face and hair.  3. Turn off the shower or move out of the shower stream.  4. Pour the CHG onto a clean washcloth. Do not use any type of brush or rough-edged sponge.  5. Starting at your neck, lather your body down to your toes. Make sure you:  6. Pay special attention to the part of your body where you will be having surgery. Scrub this area for at least 1 minute.  7. Use the full amount of CHG as directed. Usually, this is one half bottle for each shower.  8. Do not use CHG on your head or face. If the solution gets into your ears or eyes, rinse them well with water.  9. Avoid your genital area.  10. Avoid any areas of skin that have broken skin, cuts, or scrapes.  11. Scrub your back and under your arms. Make sure to wash skin folds.  12. Let the lather sit on your skin for 1-2 minutes or as long as told by your health care provider.  13. Thoroughly rinse your entire body in the shower. Make sure that all body creases and crevices are rinsed well.  14. Dry off with a clean towel. Do not put any substances on your body afterward, such as powder, lotion, or perfume.  15. Put on clean clothes or pajamas.  16. If it is the night before your surgery, sleep in clean  sheets.    What are the risks?  Risks of using CHG include:  · A skin reaction.  · Hearing loss, if CHG gets in your ears.  · Eye injury, if CHG gets in your eyes and is not rinsed out.  · The CHG product catching fire.  Make sure that you avoid smoking and flames after applying CHG to your skin.  Do not use CHG:  · If you have a chlorhexidine allergy or have previously reacted to chlorhexidine.  · On babies younger than 2 months of age.      On the day of surgery, when you are taken to your room in Outpatient Surgery you will be given a CHG prepackaged cloth to wipe the site for your surgery.  How to use CHG prepackaged cloths  · Follow the instructions on the label.  · Use the CHG cloth on clean, dry skin. Follow these steps when using a CHG cloth(unless your health care provider gives you different instructions):  1. Using the CHG cloth, vigorously scrub the part of your body where you will be having surgery. Scrub using a back-and-forth motion for 3 minutes. The area on your body should be completely wet with CHG when you are finished scrubbing.  2. Do not rinse. Discard the cloth and let the area air-dry for 1 minute. Do not put any substances on your body afterward, such as powder, lotion, or perfume.  Contact a health care provider if:  · Your skin gets irritated after scrubbing.  · You have questions about using your solution or cloth.  Get help right away if:  · Your eyes become very red or swollen.  · Your eyes itch badly.  · Your skin itches badly and is red or swollen.  · Your hearing changes.  · You have trouble seeing.  · You have swelling or tingling in your mouth or throat.  · You have trouble breathing.  · You swallow any chlorhexidine.  Summary  · Chlorhexidine gluconate (CHG) is a germ-killing (antiseptic) solution that is used to clean the skin. Cleaning your skin with CHG before surgery helps lower the risk for infection after surgery.  · You may be given CHG to use at home. It may be in a  bottle or in a prepackaged cloth to use on your skin. Carefully follow your health care provider's instructions and the instructions on the product label.  · Do not use CHG if you have a chlorhexidine allergy.  · Contact your health care provider if your skin gets irritated after scrubbing.  This information is not intended to replace advice given to you by your health care provider. Make sure you discuss any questions you have with your health care provider.  Document Released: 09/11/2013 Document Revised: 11/15/2018 Document Reviewed: 11/15/2018  [employer] Interactive Patient Education © 2019 [employer]        PATIENT/FAMILY/RESPONSIBLE PARTY VERBALIZES UNDERSTANDING OF ABOVE EDUCATION.  COPY OF PAIN SCALE GIVEN AND REVIEWED WITH VERBALIZED UNDERSTANDING.

## 2021-12-03 ENCOUNTER — APPOINTMENT (OUTPATIENT)
Dept: PREADMISSION TESTING | Facility: HOSPITAL | Age: 41
End: 2021-12-03

## 2021-12-03 ENCOUNTER — LAB (OUTPATIENT)
Dept: LAB | Facility: HOSPITAL | Age: 41
End: 2021-12-03

## 2021-12-03 ENCOUNTER — TREATMENT (OUTPATIENT)
Dept: BARIATRICS/WEIGHT MGMT | Facility: CLINIC | Age: 41
End: 2021-12-03

## 2021-12-03 VITALS — BODY MASS INDEX: 39.64 KG/M2 | HEIGHT: 64 IN | WEIGHT: 232.2 LBS

## 2021-12-03 DIAGNOSIS — E66.01 CLASS 3 SEVERE OBESITY DUE TO EXCESS CALORIES WITH SERIOUS COMORBIDITY AND BODY MASS INDEX (BMI) OF 40.0 TO 44.9 IN ADULT (HCC): Chronic | ICD-10-CM

## 2021-12-03 LAB
ALBUMIN SERPL-MCNC: 4.8 G/DL (ref 3.5–5.2)
ALBUMIN/GLOB SERPL: 1.7 G/DL
ALP SERPL-CCNC: 72 U/L (ref 39–117)
ALT SERPL W P-5'-P-CCNC: 24 U/L (ref 1–33)
ANION GAP SERPL CALCULATED.3IONS-SCNC: 10 MMOL/L (ref 5–15)
APTT PPP: 30.2 SECONDS (ref 24.1–35)
AST SERPL-CCNC: 23 U/L (ref 1–32)
BILIRUB SERPL-MCNC: 0.8 MG/DL (ref 0–1.2)
BUN SERPL-MCNC: 8 MG/DL (ref 6–20)
BUN/CREAT SERPL: 14.8 (ref 7–25)
CALCIUM SPEC-SCNC: 9.8 MG/DL (ref 8.6–10.5)
CHLORIDE SERPL-SCNC: 102 MMOL/L (ref 98–107)
CO2 SERPL-SCNC: 25 MMOL/L (ref 22–29)
CREAT SERPL-MCNC: 0.54 MG/DL (ref 0.57–1)
DEPRECATED RDW RBC AUTO: 38.2 FL (ref 37–54)
ERYTHROCYTE [DISTWIDTH] IN BLOOD BY AUTOMATED COUNT: 12.1 % (ref 12.3–15.4)
GFR SERPL CREATININE-BSD FRML MDRD: 125 ML/MIN/1.73
GLOBULIN UR ELPH-MCNC: 2.8 GM/DL
GLUCOSE SERPL-MCNC: 107 MG/DL (ref 65–99)
HCG SERPL QL: NEGATIVE
HCT VFR BLD AUTO: 41.2 % (ref 34–46.6)
HGB BLD-MCNC: 13.9 G/DL (ref 12–15.9)
INR PPP: 1.01 (ref 0.91–1.09)
MCH RBC QN AUTO: 29.2 PG (ref 26.6–33)
MCHC RBC AUTO-ENTMCNC: 33.7 G/DL (ref 31.5–35.7)
MCV RBC AUTO: 86.6 FL (ref 79–97)
PLATELET # BLD AUTO: 213 10*3/MM3 (ref 140–450)
PMV BLD AUTO: 11.6 FL (ref 6–12)
POTASSIUM SERPL-SCNC: 4 MMOL/L (ref 3.5–5.2)
PROT SERPL-MCNC: 7.6 G/DL (ref 6–8.5)
PROTHROMBIN TIME: 12.9 SECONDS (ref 11.9–14.6)
RBC # BLD AUTO: 4.76 10*6/MM3 (ref 3.77–5.28)
SODIUM SERPL-SCNC: 137 MMOL/L (ref 136–145)
WBC NRBC COR # BLD: 9.97 10*3/MM3 (ref 3.4–10.8)

## 2021-12-03 PROCEDURE — 36415 COLL VENOUS BLD VENIPUNCTURE: CPT

## 2021-12-03 PROCEDURE — 85610 PROTHROMBIN TIME: CPT

## 2021-12-03 PROCEDURE — 80053 COMPREHEN METABOLIC PANEL: CPT

## 2021-12-03 PROCEDURE — 85730 THROMBOPLASTIN TIME PARTIAL: CPT

## 2021-12-03 PROCEDURE — 84703 CHORIONIC GONADOTROPIN ASSAY: CPT

## 2021-12-03 PROCEDURE — 85027 COMPLETE CBC AUTOMATED: CPT

## 2021-12-06 ENCOUNTER — LAB (OUTPATIENT)
Dept: LAB | Facility: HOSPITAL | Age: 41
End: 2021-12-06

## 2021-12-06 DIAGNOSIS — E66.01 CLASS 3 SEVERE OBESITY DUE TO EXCESS CALORIES WITH SERIOUS COMORBIDITY AND BODY MASS INDEX (BMI) OF 40.0 TO 44.9 IN ADULT (HCC): ICD-10-CM

## 2021-12-06 LAB — SARS-COV-2 ORF1AB RESP QL NAA+PROBE: NOT DETECTED

## 2021-12-06 PROCEDURE — U0004 COV-19 TEST NON-CDC HGH THRU: HCPCS

## 2021-12-06 PROCEDURE — C9803 HOPD COVID-19 SPEC COLLECT: HCPCS

## 2021-12-07 ENCOUNTER — TELEPHONE (OUTPATIENT)
Dept: ENDOCRINOLOGY | Facility: CLINIC | Age: 41
End: 2021-12-07

## 2021-12-07 NOTE — TELEPHONE ENCOUNTER
PATIENT CALLED STATING SHE IS HAVING SURGERY TOMORROW MORNING AND IS NEEDING TO KNOW IF SHE IS TO TAKE BASAGLAR TONIGHT?   PLEASE ADVISE    PATIENT SAID YOU CAN MESSAGE HER THROUGH [employer].    THANKS

## 2021-12-08 ENCOUNTER — HOSPITAL ENCOUNTER (INPATIENT)
Facility: HOSPITAL | Age: 41
LOS: 1 days | Discharge: HOME | End: 2021-12-09
Attending: SURGERY | Admitting: SURGERY

## 2021-12-08 ENCOUNTER — ANESTHESIA (OUTPATIENT)
Dept: PERIOP | Facility: HOSPITAL | Age: 41
End: 2021-12-08

## 2021-12-08 ENCOUNTER — ANESTHESIA EVENT (OUTPATIENT)
Dept: PERIOP | Facility: HOSPITAL | Age: 41
End: 2021-12-08

## 2021-12-08 DIAGNOSIS — E11.65 TYPE 2 DIABETES MELLITUS WITH HYPERGLYCEMIA, WITH LONG-TERM CURRENT USE OF INSULIN (HCC): ICD-10-CM

## 2021-12-08 DIAGNOSIS — Z01.818 PREOP EXAMINATION: ICD-10-CM

## 2021-12-08 DIAGNOSIS — K76.0 FATTY LIVER: ICD-10-CM

## 2021-12-08 DIAGNOSIS — Z98.84 STATUS POST LAPAROSCOPIC SLEEVE GASTRECTOMY: Primary | ICD-10-CM

## 2021-12-08 DIAGNOSIS — E66.01 CLASS 3 SEVERE OBESITY DUE TO EXCESS CALORIES WITH SERIOUS COMORBIDITY AND BODY MASS INDEX (BMI) OF 40.0 TO 44.9 IN ADULT (HCC): ICD-10-CM

## 2021-12-08 DIAGNOSIS — Z79.4 TYPE 2 DIABETES MELLITUS WITH HYPERGLYCEMIA, WITH LONG-TERM CURRENT USE OF INSULIN (HCC): ICD-10-CM

## 2021-12-08 DIAGNOSIS — I10 ESSENTIAL HYPERTENSION: ICD-10-CM

## 2021-12-08 DIAGNOSIS — E78.2 MIXED HYPERLIPIDEMIA: ICD-10-CM

## 2021-12-08 LAB
ABO GROUP BLD: NORMAL
B-HCG UR QL: NEGATIVE
BLD GP AB SCN SERPL QL: NEGATIVE
GLUCOSE BLDC GLUCOMTR-MCNC: 114 MG/DL (ref 70–130)
GLUCOSE BLDC GLUCOMTR-MCNC: 119 MG/DL (ref 70–130)
GLUCOSE BLDC GLUCOMTR-MCNC: 157 MG/DL (ref 70–130)
RH BLD: NEGATIVE
T&S EXPIRATION DATE: NORMAL

## 2021-12-08 PROCEDURE — 82962 GLUCOSE BLOOD TEST: CPT

## 2021-12-08 PROCEDURE — 25010000002 ONDANSETRON PER 1 MG: Performed by: NURSE ANESTHETIST, CERTIFIED REGISTERED

## 2021-12-08 PROCEDURE — 0DB64Z3 EXCISION OF STOMACH, PERCUTANEOUS ENDOSCOPIC APPROACH, VERTICAL: ICD-10-PCS | Performed by: SURGERY

## 2021-12-08 PROCEDURE — 25010000002 ONDANSETRON PER 1 MG: Performed by: SURGERY

## 2021-12-08 PROCEDURE — 86900 BLOOD TYPING SEROLOGIC ABO: CPT | Performed by: SURGERY

## 2021-12-08 PROCEDURE — 25010000002 PROPOFOL 10 MG/ML EMULSION: Performed by: NURSE ANESTHETIST, CERTIFIED REGISTERED

## 2021-12-08 PROCEDURE — 81025 URINE PREGNANCY TEST: CPT | Performed by: SURGERY

## 2021-12-08 PROCEDURE — C1889 IMPLANT/INSERT DEVICE, NOC: HCPCS | Performed by: SURGERY

## 2021-12-08 PROCEDURE — 25010000002 METOCLOPRAMIDE PER 10 MG: Performed by: SURGERY

## 2021-12-08 PROCEDURE — 25010000002 DEXAMETHASONE PER 1 MG: Performed by: NURSE ANESTHETIST, CERTIFIED REGISTERED

## 2021-12-08 PROCEDURE — 25010000002 FENTANYL CITRATE (PF) 100 MCG/2ML SOLUTION: Performed by: NURSE ANESTHETIST, CERTIFIED REGISTERED

## 2021-12-08 PROCEDURE — 0 LIDOCAINE 1 % SOLUTION 20 ML VIAL: Performed by: SURGERY

## 2021-12-08 PROCEDURE — 25010000002 KETOROLAC TROMETHAMINE PER 15 MG: Performed by: NURSE ANESTHETIST, CERTIFIED REGISTERED

## 2021-12-08 PROCEDURE — 88307 TISSUE EXAM BY PATHOLOGIST: CPT | Performed by: SURGERY

## 2021-12-08 PROCEDURE — 25010000002 FENTANYL CITRATE (PF) 50 MCG/ML SOLUTION: Performed by: ANESTHESIOLOGY

## 2021-12-08 PROCEDURE — 86850 RBC ANTIBODY SCREEN: CPT | Performed by: SURGERY

## 2021-12-08 PROCEDURE — 94799 UNLISTED PULMONARY SVC/PX: CPT

## 2021-12-08 PROCEDURE — 43775 LAP SLEEVE GASTRECTOMY: CPT | Performed by: SURGERY

## 2021-12-08 PROCEDURE — 25010000002 ENOXAPARIN PER 10 MG: Performed by: SURGERY

## 2021-12-08 PROCEDURE — 25010000002 MIDAZOLAM PER 1 MG: Performed by: ANESTHESIOLOGY

## 2021-12-08 PROCEDURE — 63710000001 INSULIN LISPRO (HUMAN) PER 5 UNITS: Performed by: SURGERY

## 2021-12-08 PROCEDURE — 43775 LAP SLEEVE GASTRECTOMY: CPT | Performed by: NURSE PRACTITIONER

## 2021-12-08 PROCEDURE — 86901 BLOOD TYPING SEROLOGIC RH(D): CPT | Performed by: SURGERY

## 2021-12-08 DEVICE — ENDOPATH ECHELON ENDOSCOPIC LINEAR CUTTER RELOADS, BLUE, 60MM
Type: IMPLANTABLE DEVICE | Site: ABDOMEN | Status: FUNCTIONAL
Brand: ECHELON ENDOPATH

## 2021-12-08 DEVICE — ECHELON 60MM REINFORCEMENT
Type: IMPLANTABLE DEVICE | Site: ABDOMEN | Status: FUNCTIONAL
Brand: ECHLEON

## 2021-12-08 DEVICE — HEMOST ABS SURGICEL SNOW 4X4IN: Type: IMPLANTABLE DEVICE | Site: ABDOMEN | Status: FUNCTIONAL

## 2021-12-08 RX ORDER — FENTANYL CITRATE 50 UG/ML
INJECTION, SOLUTION INTRAMUSCULAR; INTRAVENOUS AS NEEDED
Status: DISCONTINUED | OUTPATIENT
Start: 2021-12-08 | End: 2021-12-08 | Stop reason: SURG

## 2021-12-08 RX ORDER — MIDAZOLAM HYDROCHLORIDE 1 MG/ML
2 INJECTION INTRAMUSCULAR; INTRAVENOUS
Status: DISCONTINUED | OUTPATIENT
Start: 2021-12-08 | End: 2021-12-08 | Stop reason: HOSPADM

## 2021-12-08 RX ORDER — ONDANSETRON 2 MG/ML
INJECTION INTRAMUSCULAR; INTRAVENOUS AS NEEDED
Status: DISCONTINUED | OUTPATIENT
Start: 2021-12-08 | End: 2021-12-08 | Stop reason: SURG

## 2021-12-08 RX ORDER — NALOXONE HCL 0.4 MG/ML
0.4 VIAL (ML) INJECTION AS NEEDED
Status: DISCONTINUED | OUTPATIENT
Start: 2021-12-08 | End: 2021-12-08 | Stop reason: HOSPADM

## 2021-12-08 RX ORDER — KETAMINE HCL IN NACL, ISO-OSM 100MG/10ML
SYRINGE (ML) INJECTION AS NEEDED
Status: DISCONTINUED | OUTPATIENT
Start: 2021-12-08 | End: 2021-12-08 | Stop reason: SURG

## 2021-12-08 RX ORDER — SODIUM CHLORIDE 9 MG/ML
INJECTION, SOLUTION INTRAVENOUS AS NEEDED
Status: DISCONTINUED | OUTPATIENT
Start: 2021-12-08 | End: 2021-12-08 | Stop reason: HOSPADM

## 2021-12-08 RX ORDER — FENTANYL CITRATE 50 UG/ML
25 INJECTION, SOLUTION INTRAMUSCULAR; INTRAVENOUS
Status: DISCONTINUED | OUTPATIENT
Start: 2021-12-08 | End: 2021-12-08 | Stop reason: HOSPADM

## 2021-12-08 RX ORDER — DIPHENHYDRAMINE HYDROCHLORIDE 50 MG/ML
25 INJECTION INTRAMUSCULAR; INTRAVENOUS EVERY 6 HOURS PRN
Status: DISCONTINUED | OUTPATIENT
Start: 2021-12-08 | End: 2021-12-09 | Stop reason: HOSPADM

## 2021-12-08 RX ORDER — MAGNESIUM HYDROXIDE 1200 MG/15ML
LIQUID ORAL AS NEEDED
Status: DISCONTINUED | OUTPATIENT
Start: 2021-12-08 | End: 2021-12-08 | Stop reason: HOSPADM

## 2021-12-08 RX ORDER — LABETALOL HYDROCHLORIDE 5 MG/ML
10 INJECTION, SOLUTION INTRAVENOUS EVERY 6 HOURS PRN
Status: DISCONTINUED | OUTPATIENT
Start: 2021-12-08 | End: 2021-12-09 | Stop reason: HOSPADM

## 2021-12-08 RX ORDER — SIMETHICONE 80 MG
40 TABLET,CHEWABLE ORAL 4 TIMES DAILY PRN
Status: DISCONTINUED | OUTPATIENT
Start: 2021-12-08 | End: 2021-12-09 | Stop reason: HOSPADM

## 2021-12-08 RX ORDER — LABETALOL HYDROCHLORIDE 5 MG/ML
5 INJECTION, SOLUTION INTRAVENOUS
Status: DISCONTINUED | OUTPATIENT
Start: 2021-12-08 | End: 2021-12-08 | Stop reason: HOSPADM

## 2021-12-08 RX ORDER — TRAMADOL HYDROCHLORIDE 50 MG/1
50 TABLET ORAL EVERY 6 HOURS PRN
Status: DISCONTINUED | OUTPATIENT
Start: 2021-12-08 | End: 2021-12-09 | Stop reason: HOSPADM

## 2021-12-08 RX ORDER — KETOROLAC TROMETHAMINE 30 MG/ML
30 INJECTION, SOLUTION INTRAMUSCULAR; INTRAVENOUS EVERY 6 HOURS PRN
Status: DISCONTINUED | OUTPATIENT
Start: 2021-12-08 | End: 2021-12-09 | Stop reason: HOSPADM

## 2021-12-08 RX ORDER — METOCLOPRAMIDE HYDROCHLORIDE 5 MG/ML
5 INJECTION INTRAMUSCULAR; INTRAVENOUS EVERY 6 HOURS
Status: DISCONTINUED | OUTPATIENT
Start: 2021-12-08 | End: 2021-12-09 | Stop reason: HOSPADM

## 2021-12-08 RX ORDER — SODIUM CHLORIDE 0.9 % (FLUSH) 0.9 %
10 SYRINGE (ML) INJECTION EVERY 12 HOURS SCHEDULED
Status: DISCONTINUED | OUTPATIENT
Start: 2021-12-08 | End: 2021-12-09 | Stop reason: HOSPADM

## 2021-12-08 RX ORDER — DEXAMETHASONE SODIUM PHOSPHATE 4 MG/ML
4 INJECTION, SOLUTION INTRA-ARTICULAR; INTRALESIONAL; INTRAMUSCULAR; INTRAVENOUS; SOFT TISSUE EVERY 8 HOURS PRN
Status: DISCONTINUED | OUTPATIENT
Start: 2021-12-08 | End: 2021-12-09 | Stop reason: HOSPADM

## 2021-12-08 RX ORDER — CLINDAMYCIN PHOSPHATE 900 MG/50ML
900 INJECTION INTRAVENOUS EVERY 8 HOURS
Status: COMPLETED | OUTPATIENT
Start: 2021-12-08 | End: 2021-12-09

## 2021-12-08 RX ORDER — LIDOCAINE HYDROCHLORIDE 20 MG/ML
INJECTION, SOLUTION EPIDURAL; INFILTRATION; INTRACAUDAL; PERINEURAL AS NEEDED
Status: DISCONTINUED | OUTPATIENT
Start: 2021-12-08 | End: 2021-12-08 | Stop reason: SURG

## 2021-12-08 RX ORDER — PROPOFOL 10 MG/ML
VIAL (ML) INTRAVENOUS AS NEEDED
Status: DISCONTINUED | OUTPATIENT
Start: 2021-12-08 | End: 2021-12-08 | Stop reason: SURG

## 2021-12-08 RX ORDER — SODIUM CHLORIDE 0.9 % (FLUSH) 0.9 %
1-10 SYRINGE (ML) INJECTION AS NEEDED
Status: DISCONTINUED | OUTPATIENT
Start: 2021-12-08 | End: 2021-12-09 | Stop reason: HOSPADM

## 2021-12-08 RX ORDER — ONDANSETRON 2 MG/ML
4 INJECTION INTRAMUSCULAR; INTRAVENOUS ONCE AS NEEDED
Status: DISCONTINUED | OUTPATIENT
Start: 2021-12-08 | End: 2021-12-08 | Stop reason: HOSPADM

## 2021-12-08 RX ORDER — SODIUM CHLORIDE, SODIUM LACTATE, POTASSIUM CHLORIDE, CALCIUM CHLORIDE 600; 310; 30; 20 MG/100ML; MG/100ML; MG/100ML; MG/100ML
100 INJECTION, SOLUTION INTRAVENOUS CONTINUOUS
Status: DISCONTINUED | OUTPATIENT
Start: 2021-12-08 | End: 2021-12-08 | Stop reason: HOSPADM

## 2021-12-08 RX ORDER — GABAPENTIN 250 MG/5ML
250 SOLUTION ORAL ONCE
Status: COMPLETED | OUTPATIENT
Start: 2021-12-08 | End: 2021-12-08

## 2021-12-08 RX ORDER — SODIUM CHLORIDE, SODIUM LACTATE, POTASSIUM CHLORIDE, CALCIUM CHLORIDE 600; 310; 30; 20 MG/100ML; MG/100ML; MG/100ML; MG/100ML
75 INJECTION, SOLUTION INTRAVENOUS CONTINUOUS
Status: DISCONTINUED | OUTPATIENT
Start: 2021-12-08 | End: 2021-12-09 | Stop reason: HOSPADM

## 2021-12-08 RX ORDER — KETOROLAC TROMETHAMINE 30 MG/ML
INJECTION, SOLUTION INTRAMUSCULAR; INTRAVENOUS AS NEEDED
Status: DISCONTINUED | OUTPATIENT
Start: 2021-12-08 | End: 2021-12-08 | Stop reason: SURG

## 2021-12-08 RX ORDER — FLUMAZENIL 0.1 MG/ML
0.2 INJECTION INTRAVENOUS AS NEEDED
Status: DISCONTINUED | OUTPATIENT
Start: 2021-12-08 | End: 2021-12-08 | Stop reason: HOSPADM

## 2021-12-08 RX ORDER — DEXAMETHASONE SODIUM PHOSPHATE 4 MG/ML
INJECTION, SOLUTION INTRA-ARTICULAR; INTRALESIONAL; INTRAMUSCULAR; INTRAVENOUS; SOFT TISSUE AS NEEDED
Status: DISCONTINUED | OUTPATIENT
Start: 2021-12-08 | End: 2021-12-08 | Stop reason: SURG

## 2021-12-08 RX ORDER — SODIUM CHLORIDE 0.9 % (FLUSH) 0.9 %
3 SYRINGE (ML) INJECTION AS NEEDED
Status: DISCONTINUED | OUTPATIENT
Start: 2021-12-08 | End: 2021-12-08 | Stop reason: HOSPADM

## 2021-12-08 RX ORDER — LIDOCAINE HYDROCHLORIDE 10 MG/ML
0.5 INJECTION, SOLUTION EPIDURAL; INFILTRATION; INTRACAUDAL; PERINEURAL ONCE AS NEEDED
Status: DISCONTINUED | OUTPATIENT
Start: 2021-12-08 | End: 2021-12-08 | Stop reason: HOSPADM

## 2021-12-08 RX ORDER — ROCURONIUM BROMIDE 10 MG/ML
INJECTION, SOLUTION INTRAVENOUS AS NEEDED
Status: DISCONTINUED | OUTPATIENT
Start: 2021-12-08 | End: 2021-12-08 | Stop reason: SURG

## 2021-12-08 RX ORDER — SCOLOPAMINE TRANSDERMAL SYSTEM 1 MG/1
1 PATCH, EXTENDED RELEASE TRANSDERMAL ONCE
Status: DISCONTINUED | OUTPATIENT
Start: 2021-12-08 | End: 2021-12-09 | Stop reason: HOSPADM

## 2021-12-08 RX ORDER — HYDROMORPHONE HYDROCHLORIDE 1 MG/ML
0.5 INJECTION, SOLUTION INTRAMUSCULAR; INTRAVENOUS; SUBCUTANEOUS EVERY 4 HOURS PRN
Status: DISCONTINUED | OUTPATIENT
Start: 2021-12-08 | End: 2021-12-09

## 2021-12-08 RX ORDER — OXYCODONE AND ACETAMINOPHEN 7.5; 325 MG/1; MG/1
2 TABLET ORAL EVERY 4 HOURS PRN
Status: CANCELLED | OUTPATIENT
Start: 2021-12-08 | End: 2021-12-18

## 2021-12-08 RX ORDER — SODIUM CHLORIDE 0.9 % (FLUSH) 0.9 %
3 SYRINGE (ML) INJECTION EVERY 12 HOURS SCHEDULED
Status: DISCONTINUED | OUTPATIENT
Start: 2021-12-08 | End: 2021-12-08 | Stop reason: HOSPADM

## 2021-12-08 RX ORDER — SODIUM CHLORIDE, SODIUM LACTATE, POTASSIUM CHLORIDE, CALCIUM CHLORIDE 600; 310; 30; 20 MG/100ML; MG/100ML; MG/100ML; MG/100ML
1000 INJECTION, SOLUTION INTRAVENOUS CONTINUOUS
Status: DISCONTINUED | OUTPATIENT
Start: 2021-12-08 | End: 2021-12-08 | Stop reason: HOSPADM

## 2021-12-08 RX ORDER — NEOSTIGMINE METHYLSULFATE 5 MG/5 ML
SYRINGE (ML) INTRAVENOUS AS NEEDED
Status: DISCONTINUED | OUTPATIENT
Start: 2021-12-08 | End: 2021-12-08 | Stop reason: SURG

## 2021-12-08 RX ORDER — SODIUM CHLORIDE 0.9 % (FLUSH) 0.9 %
3-10 SYRINGE (ML) INJECTION AS NEEDED
Status: DISCONTINUED | OUTPATIENT
Start: 2021-12-08 | End: 2021-12-08 | Stop reason: HOSPADM

## 2021-12-08 RX ORDER — ONDANSETRON 2 MG/ML
4 INJECTION INTRAMUSCULAR; INTRAVENOUS EVERY 6 HOURS SCHEDULED
Status: DISCONTINUED | OUTPATIENT
Start: 2021-12-08 | End: 2021-12-09 | Stop reason: HOSPADM

## 2021-12-08 RX ORDER — PHENYLEPHRINE HCL IN 0.9% NACL 1 MG/10 ML
SYRINGE (ML) INTRAVENOUS AS NEEDED
Status: DISCONTINUED | OUTPATIENT
Start: 2021-12-08 | End: 2021-12-08 | Stop reason: SURG

## 2021-12-08 RX ORDER — OXYCODONE AND ACETAMINOPHEN 10; 325 MG/1; MG/1
1 TABLET ORAL ONCE AS NEEDED
Status: CANCELLED | OUTPATIENT
Start: 2021-12-08

## 2021-12-08 RX ORDER — CLINDAMYCIN PHOSPHATE 900 MG/50ML
900 INJECTION INTRAVENOUS ONCE
Status: COMPLETED | OUTPATIENT
Start: 2021-12-08 | End: 2021-12-08

## 2021-12-08 RX ORDER — SODIUM CHLORIDE 0.9 % (FLUSH) 0.9 %
10 SYRINGE (ML) INJECTION AS NEEDED
Status: DISCONTINUED | OUTPATIENT
Start: 2021-12-08 | End: 2021-12-08 | Stop reason: HOSPADM

## 2021-12-08 RX ADMIN — GLYCOPYRROLATE 0.8 MG: 0.2 INJECTION, SOLUTION INTRAMUSCULAR; INTRAVENOUS at 14:57

## 2021-12-08 RX ADMIN — ONDANSETRON 4 MG: 2 INJECTION INTRAMUSCULAR; INTRAVENOUS at 17:21

## 2021-12-08 RX ADMIN — CLINDAMYCIN IN 5 PERCENT DEXTROSE 900 MG: 18 INJECTION, SOLUTION INTRAVENOUS at 21:41

## 2021-12-08 RX ADMIN — FENTANYL CITRATE 50 MCG: 50 INJECTION, SOLUTION INTRAMUSCULAR; INTRAVENOUS at 15:08

## 2021-12-08 RX ADMIN — Medication 5 MG: at 14:57

## 2021-12-08 RX ADMIN — ONDANSETRON 4 MG: 2 INJECTION INTRAMUSCULAR; INTRAVENOUS at 15:00

## 2021-12-08 RX ADMIN — ENOXAPARIN SODIUM 40 MG: 40 INJECTION SUBCUTANEOUS at 12:34

## 2021-12-08 RX ADMIN — SODIUM CHLORIDE, POTASSIUM CHLORIDE, SODIUM LACTATE AND CALCIUM CHLORIDE 1000 ML: 600; 310; 30; 20 INJECTION, SOLUTION INTRAVENOUS at 12:13

## 2021-12-08 RX ADMIN — KETOROLAC TROMETHAMINE 60 MG: 30 INJECTION, SOLUTION INTRAMUSCULAR; INTRAVENOUS at 14:57

## 2021-12-08 RX ADMIN — METOCLOPRAMIDE HYDROCHLORIDE 5 MG: 5 INJECTION INTRAMUSCULAR; INTRAVENOUS at 18:24

## 2021-12-08 RX ADMIN — SCOLOPAMINE TRANSDERMAL SYSTEM 1 PATCH: 1 PATCH, EXTENDED RELEASE TRANSDERMAL at 12:35

## 2021-12-08 RX ADMIN — Medication 10 MG: at 14:04

## 2021-12-08 RX ADMIN — Medication 150 MCG: at 13:59

## 2021-12-08 RX ADMIN — SODIUM CHLORIDE, POTASSIUM CHLORIDE, SODIUM LACTATE AND CALCIUM CHLORIDE 1000 ML: 600; 310; 30; 20 INJECTION, SOLUTION INTRAVENOUS at 09:52

## 2021-12-08 RX ADMIN — FENTANYL CITRATE 25 MCG: 50 INJECTION, SOLUTION INTRAMUSCULAR; INTRAVENOUS at 14:55

## 2021-12-08 RX ADMIN — Medication 150 MCG: at 14:02

## 2021-12-08 RX ADMIN — SODIUM CHLORIDE, POTASSIUM CHLORIDE, SODIUM LACTATE AND CALCIUM CHLORIDE 500 ML: 600; 310; 30; 20 INJECTION, SOLUTION INTRAVENOUS at 09:56

## 2021-12-08 RX ADMIN — GABAPENTIN 250 MG: 250 SOLUTION ORAL at 09:47

## 2021-12-08 RX ADMIN — LIDOCAINE HYDROCHLORIDE 100 MG: 20 INJECTION, SOLUTION EPIDURAL; INFILTRATION; INTRACAUDAL; PERINEURAL at 13:35

## 2021-12-08 RX ADMIN — SODIUM CHLORIDE, POTASSIUM CHLORIDE, SODIUM LACTATE AND CALCIUM CHLORIDE 1000 ML: 600; 310; 30; 20 INJECTION, SOLUTION INTRAVENOUS at 09:47

## 2021-12-08 RX ADMIN — DEXAMETHASONE SODIUM PHOSPHATE 4 MG: 4 INJECTION, SOLUTION INTRA-ARTICULAR; INTRALESIONAL; INTRAMUSCULAR; INTRAVENOUS; SOFT TISSUE at 13:46

## 2021-12-08 RX ADMIN — ROCURONIUM BROMIDE 50 MG: 10 INJECTION INTRAVENOUS at 13:35

## 2021-12-08 RX ADMIN — Medication 10 MG: at 13:53

## 2021-12-08 RX ADMIN — Medication 10 MG: at 15:10

## 2021-12-08 RX ADMIN — Medication 20 MG: at 13:47

## 2021-12-08 RX ADMIN — INSULIN LISPRO 2 UNITS: 100 INJECTION, SOLUTION INTRAVENOUS; SUBCUTANEOUS at 21:41

## 2021-12-08 RX ADMIN — FENTANYL CITRATE 150 MCG: 50 INJECTION, SOLUTION INTRAMUSCULAR; INTRAVENOUS at 13:42

## 2021-12-08 RX ADMIN — FENTANYL CITRATE 25 MCG: 50 INJECTION, SOLUTION INTRAMUSCULAR; INTRAVENOUS at 14:49

## 2021-12-08 RX ADMIN — CLINDAMYCIN IN 5 PERCENT DEXTROSE 900 MG: 18 INJECTION, SOLUTION INTRAVENOUS at 13:46

## 2021-12-08 RX ADMIN — PROPOFOL 200 MG: 10 INJECTION, EMULSION INTRAVENOUS at 13:35

## 2021-12-08 RX ADMIN — FENTANYL CITRATE 25 MCG: 50 INJECTION INTRAMUSCULAR; INTRAVENOUS at 15:40

## 2021-12-08 RX ADMIN — SODIUM CHLORIDE, POTASSIUM CHLORIDE, SODIUM LACTATE AND CALCIUM CHLORIDE 140 ML/HR: 600; 310; 30; 20 INJECTION, SOLUTION INTRAVENOUS at 17:09

## 2021-12-08 RX ADMIN — FENTANYL CITRATE 25 MCG: 50 INJECTION INTRAMUSCULAR; INTRAVENOUS at 15:45

## 2021-12-08 RX ADMIN — MIDAZOLAM 2 MG: 1 INJECTION INTRAMUSCULAR; INTRAVENOUS at 12:35

## 2021-12-08 NOTE — ANESTHESIA PROCEDURE NOTES
Airway  Urgency: elective    Date/Time: 12/8/2021 1:36 PM  Airway not difficult    General Information and Staff    Patient location during procedure: OR  CRNA: Alberto Caal CRNA    Indications and Patient Condition  Indications for airway management: airway protection    Preoxygenated: yes  MILS maintained throughout  Mask difficulty assessment: 1 - vent by mask    Final Airway Details  Final airway type: endotracheal airway      Successful airway: ETT  Cuffed: yes   Successful intubation technique: direct laryngoscopy  Facilitating devices/methods: intubating stylet  Endotracheal tube insertion site: oral  Blade: Jo  Blade size: 4  ETT size (mm): 7.5  Cormack-Lehane Classification: grade I - full view of glottis  Placement verified by: chest auscultation and capnometry   Cuff volume (mL): 8  Measured from: lips  ETT/EBT  to lips (cm): 21  Number of attempts at approach: 1

## 2021-12-08 NOTE — OP NOTE
Laparoscopic Sleeve Gastrectomy     Pre operative diagnosis: Body mass index is 39.46 kg/m².  with associated comorbidities of type 2 diabetes, hypertension, hyperlipidemia, fatty liver disease      Post op diagnosis: As above,  Indications: The patient was admitted to the hospital with history of morbid obesity.   she is scheduled for a Laparoscopic Sleeve Gastrectomy.       Surgeon: Kermit Marlow MD     Assistants: Roseanna APARICIO: Responsible for camera position and closing of wounds necessary for the completion of our procedure.    Anesthesia: General endotracheal anesthesia    ASA Class: 3, 4          Procedure Details       After the patient was explained the alternatives, benefits, complications, and risks of the laparoscopic sleeve gastrectomy informed consent was provided.  The patient was brought to the or suite after receiving preoperative antibiotics medications and anticoagulation.  The patient was placed under general anesthesia.  The patient was then prepped and draped in standard fashion.  We performed a surgical Timeout.  A 40 Occitan bougie was placed into the oropharynx by anesthesia and guided down into the stomach followed by placement to suction.  I then proceeded to locally anesthetize a left subcostal incision site for placement of a Veress Needle.  I placed saline to confirm position and then connected the tubing to create pneumo.  Once the pressure reached 15 mmHg I proceeded placement of my left 5 mm trocar with camera assist into the abdominal cavity.  I then placed in the  periumbilical location a 12 mm incision followed by placement of a 12 mm trocar into the abdominal cavity under direct visualization.  I identified the Veress needle and removed this under direct visualization.  I noted that the Veress needle was not involved in any intra-abdominal structures and was easily removed.  A 15 mm incision in the right upper quadrant was made for placement of a 15 mm trocar under direct  visualization.  A 12 mm incision was made inferior and lateral to this site for placement of a 12 mm trocar under direct visualization into the abdominal cavity.  A 5 mm incision was placed in the subxiphoid location for placement of a 5 mm trocar under direct visualization.  I removed this trocar and replaced it with a Aman liver retractor.   Note that all skin wounds were locally anesthetized prior to making incisions for placement of the trochars.  I requested the patient be placed in reverse Trendelenburg.      The bougie was then placed to suction after positioning under direct visualization.  I dissected the fat pad near the angle of Hiss away from the diaphragm.   I created a window into the lesser sack bluntly then proceeded with ligating the greater curvature vessels starting at my most distal ligation site 4-6 cm from the pylorus.  I continued proximally along the greater curvature ligating the vessels as well as a short gastrics.  This was all accomplished with the Harmonic device.  Once completion of ligation of the vessels was obtained, I dissected away attachments found posteriorly to the stomach.  Care was made to assure no injury to the pancreas. Completion of my dissection was obtained once visualization of the posterior left crural muscle seen.  The sleeve gastrectomy was then created by firing of the stapler device to create the sleeve gastrectomy.  I utilized seam guard covering over the staplers cartridge.  My most distal staple line was approximately 4-6 cm from the pylorus and I continued proximally along the greater curvature assuring that the width from the angularis incisura was at least 3 cm.  My final proximal stapler was at least 1-2 cm from the GE junction. I then proceeded with my leak test.  This was performed by placing the patient in Trendelenburg and irrigating around the sleeve gastrectomy saline. I assessed for leaks by submerging the staple line under saline and then  requested anesthesia to insufflate the bougie with air. I then assessed for air bubbles along the staple line.  Once there was no evidence of leakage. I then requested that the bougie be placed off suction and then to break its seal for easy removal.  The fluid from the abdominal cavity was suctioned away.  The bougie was then removed under direct visualization off suction and only after the suction seal was broken with air under direct visualization.  I then assessed the staple line to observe for hemostasis.  I placed surgicel snow on the staple line. I placed a simple 3-0 Vicryl imbricating suture at the most proximal portion of the sleeve gastrectomy incorporating omentum with this stitch.  I then proceeded to remove the resected portion of the stomach through the 15 mm trocar/port assist site under direct visualization.  I reassessed for hemostasis, and once this was confirmed proceeded to closing wound site's Fascial with suture.  Closing the 15 mm trocar and the 12 mm supraumbilical trocar sites with a Owen Johnson device using an 0 Vicryl suture. The liver retractor was removed under direct visualization as well as the pneumoperitoneum and remaining trochars.  I re-locally anesthetized skin wounds prior to closure.  Skin wounds were closed with 4-0 Monocryl.  Of note prior to closing skin wounds all lap pads and instruments were accounted for at the end of the procedure.    Blue staples: 8    Findings: Within normal limits    Estimated Blood Loss:  minimal            Total IV Fluids: 1400 ml           Specimens:   Specimens     ID Source Type Tests Collected By Collected At Frozen?    A Stomach Tissue · TISSUE PATHOLOGY EXAM   Kermit Marlow MD 12/8/21 0535 No    Description: Fundus of Stomach                 Implants:   Implant Name Type Inv. Item Serial No.  Lot No. LRB No. Used Action   STPL/LN REINF ECHELONENDOPATH 60MM - [id number] Implant STPL/LN REINF ECHELONENDOPATH 60MM  [facility]  ENDO SURGERY  DIV OF J AND J [id number] N/A 5 Implanted   STPL/LN REINF ECHELONENDOPATH 60MM - [id number] Implant STPL/LN REINF ECHELONENDOPATH 60MM  [employer]  DIV OF J AND J [id number] N/A 1 Implanted   RELOAD ECHELON FLEX GST REG 3.6MM DAGMAR - [id number] Implant RELOAD ECHELON FLEX GST REG 3.6MM DAGMAR  [employer] END[employer]  DIV OF J AND J [id number] N/A 4 Implanted   RELOAD ECHELON FLEX GST REG 3.6MM DAGMAR - [id number] Implant RELOAD ECHELON FLEX GST REG 3.6MM DAGMAR  [employer] ENDO [employer]  DIV OF J AND J [id number] N/A 4 Implanted   STPL/LN REINF ECHELONENDOPATH 60MM - [id number] Implant STPL/LN REINF ECHELONENDOPATH 60MM  [employer] ENDO [employer]  DIV OF J AND J [id number] N/A 2 Implanted   HEMOST ABS SURGICEL SNOW 4X4IN - [id number] Implant HEMOST ABS SURGICEL SNOW 4X4IN  [employer]  DIV OF J AND J  N/A 1 Implanted              Complications: None           Disposition: PACU - hemodynamically stable.           Condition: stable

## 2021-12-08 NOTE — ANESTHESIA POSTPROCEDURE EVALUATION
Patient: Harry Panda    Procedure Summary     Date: 12/08/21 Room / Location:  [facility]  /  [facility]    Anesthesia Start: 1331 Anesthesia Stop: 1514    Procedure: GASTRIC SLEEVE LAPAROSCOPIC (N/A Abdomen) Diagnosis:       Class 3 severe obesity due to excess calories with serious comorbidity and body mass index (BMI) of 40.0 to 44.9 in adult (HCC)      Type 2 diabetes mellitus with hyperglycemia, with long-term current use of insulin (HCC)      Essential hypertension      Mixed hyperlipidemia      Fatty liver      Preop examination      (Class 3 severe obesity due to excess calories with serious comorbidity and body mass index (BMI) of 40.0 to 44.9 in adult (HCC) [E66.01, Z68.41])      (Type 2 diabetes mellitus with hyperglycemia, with long-term current use of insulin (HCC) [E11.65, Z79.4])      (Essential hypertension [I10])      (Mixed hyperlipidemia [E78.2])      (Fatty liver [K76.0])      (Preop examination [Z01.818])    Surgeons: Kermit Marlow MD Provider: Rudy Farrar CRNA    Anesthesia Type: general ASA Status: 3          Anesthesia Type: general    Vitals  Vitals Value Taken Time   /70 12/08/21 1610   Temp 98.1 °F (36.7 °C) 12/08/21 1610   Pulse 81 12/08/21 1610   Resp 16 12/08/21 1610   SpO2 97 % 12/08/21 1610           Post Anesthesia Care and Evaluation    Patient location during evaluation: PACU  Patient participation: complete - patient participated  Level of consciousness: awake and awake and alert  Pain score: 0  Pain management: adequate  Airway patency: patent  Anesthetic complications: No anesthetic complications  PONV Status: none  Cardiovascular status: acceptable  Respiratory status: acceptable  Hydration status: acceptable    Comments: Patient discharged according to acceptable Lynn score per RN assessment. See nursing records for further information.     Blood pressure 121/71, pulse 81, temperature 97.6 °F (36.4 °C), temperature source Oral, resp. rate 16,  "height 162.5 cm (63.98\"), weight 104 kg (229 lb 11.5 oz), last menstrual period 11/23/2021, SpO2 97 %, not currently breastfeeding.        "

## 2021-12-08 NOTE — BRIEF OP NOTE
GASTRIC SLEEVE LAPAROSCOPIC  Progress Note    Harry Panda  12/8/2021    Pre-op Diagnosis:   Class 3 severe obesity due to excess calories with serious comorbidity and body mass index (BMI) of 40.0 to 44.9 in adult (HCC) [E66.01, Z68.41]  Type 2 diabetes mellitus with hyperglycemia, with long-term current use of insulin (HCC) [E11.65, Z79.4]  Essential hypertension [I10]  Mixed hyperlipidemia [E78.2]  Fatty liver [K76.0]  Preop examination [Z01.818]       Post-Op Diagnosis Codes:     * Class 3 severe obesity due to excess calories with serious comorbidity and body mass index (BMI) of 40.0 to 44.9 in adult (HCC) [E66.01, Z68.41]     * Type 2 diabetes mellitus with hyperglycemia, with long-term current use of insulin (HCC) [E11.65, Z79.4]     * Essential hypertension [I10]     * Mixed hyperlipidemia [E78.2]     * Fatty liver [K76.0]     * Preop examination [Z01.818]    Procedure/CPT® Codes:    Procedure(s):  GASTRIC SLEEVE LAPAROSCOPIC    Surgeon(s):  Kermit Marlow MD    Anesthesia: General    Staff:   Circulator: Watson Burns RN; Cristhian Faye RN  Scrub Person: Maxi Dye; Juana Rand; Luis Antonio Rubin; Ollie Amaral       Estimated Blood Loss: minimal    Urine Voided: * No values recorded between 12/8/2021  1:31 PM and 12/8/2021  2:55 PM *    Specimens:                Specimens     ID Source Type Tests Collected By Collected At Frozen?    A Stomach Tissue · TISSUE PATHOLOGY EXAM   Kermit Marlow MD 12/8/21 4516 No    Description: Fundus of Stomach            Findings: wnl    Complications: none          Kermit Marlow MD     Date: 12/8/2021  Time: 14:56 CST

## 2021-12-08 NOTE — ANESTHESIA PREPROCEDURE EVALUATION
Anesthesia Evaluation     Patient summary reviewed and Nursing notes reviewed   history of anesthetic complications: PONV  NPO Solid Status: > 8 hours  NPO Liquid Status: > 4 hours           Airway   Mallampati: II  No difficulty expected  Dental - normal exam     Pulmonary    (-) asthma, not a smoker  Cardiovascular   Exercise tolerance: good (4-7 METS)    (+) hypertension, past MI , CAD, cardiac stents (Sept 2020 ) hyperlipidemia,   (-) pacemaker      Neuro/Psych  (+) psychiatric history Anxiety,     (-) seizures, CVA  GI/Hepatic/Renal/Endo    (+) obesity,   liver disease fatty liver disease, diabetes mellitus using insulin,   (-) no renal disease    Musculoskeletal     Abdominal   (+) obese,    Substance History - negative use     OB/GYN          Other   arthritis,        Other Comment: PCOS                     Anesthesia Plan    ASA 3     general     intravenous induction     Anesthetic plan, all risks, benefits, and alternatives have been provided, discussed and informed consent has been obtained with: patient.

## 2021-12-09 VITALS
HEART RATE: 84 BPM | BODY MASS INDEX: 39.22 KG/M2 | TEMPERATURE: 98.4 F | RESPIRATION RATE: 16 BRPM | DIASTOLIC BLOOD PRESSURE: 85 MMHG | HEIGHT: 64 IN | SYSTOLIC BLOOD PRESSURE: 150 MMHG | WEIGHT: 229.72 LBS | OXYGEN SATURATION: 98 %

## 2021-12-09 LAB
CYTO UR: NORMAL
LAB AP CASE REPORT: NORMAL
PATH REPORT.FINAL DX SPEC: NORMAL
PATH REPORT.GROSS SPEC: NORMAL

## 2021-12-09 PROCEDURE — 25010000002 METOCLOPRAMIDE PER 10 MG: Performed by: SURGERY

## 2021-12-09 PROCEDURE — 25010000002 ENOXAPARIN PER 10 MG: Performed by: SURGERY

## 2021-12-09 PROCEDURE — 99024 POSTOP FOLLOW-UP VISIT: CPT | Performed by: NURSE PRACTITIONER

## 2021-12-09 PROCEDURE — 25010000002 ONDANSETRON PER 1 MG: Performed by: SURGERY

## 2021-12-09 RX ORDER — TRAMADOL HYDROCHLORIDE 50 MG/1
50 TABLET ORAL EVERY 8 HOURS PRN
Qty: 30 TABLET | Refills: 0 | Status: SHIPPED | OUTPATIENT
Start: 2021-12-09

## 2021-12-09 RX ORDER — SIMETHICONE 80 MG
40 TABLET,CHEWABLE ORAL EVERY 6 HOURS PRN
Qty: 30 TABLET | Refills: 1 | Status: SHIPPED | OUTPATIENT
Start: 2021-12-09 | End: 2022-02-07

## 2021-12-09 RX ORDER — ONDANSETRON 4 MG/1
4 TABLET, ORALLY DISINTEGRATING ORAL EVERY 8 HOURS PRN
Qty: 30 TABLET | Refills: 1 | Status: SHIPPED | OUTPATIENT
Start: 2021-12-09 | End: 2022-02-07

## 2021-12-09 RX ORDER — OMEPRAZOLE 20 MG/1
20 CAPSULE, DELAYED RELEASE ORAL DAILY
Qty: 30 CAPSULE | Refills: 0 | Status: SHIPPED | OUTPATIENT
Start: 2021-12-09

## 2021-12-09 RX ADMIN — ENOXAPARIN SODIUM 40 MG: 40 INJECTION SUBCUTANEOUS at 08:15

## 2021-12-09 RX ADMIN — ONDANSETRON 4 MG: 2 INJECTION INTRAMUSCULAR; INTRAVENOUS at 05:31

## 2021-12-09 RX ADMIN — SODIUM CHLORIDE, POTASSIUM CHLORIDE, SODIUM LACTATE AND CALCIUM CHLORIDE 140 ML/HR: 600; 310; 30; 20 INJECTION, SOLUTION INTRAVENOUS at 01:06

## 2021-12-09 RX ADMIN — ONDANSETRON 4 MG: 2 INJECTION INTRAMUSCULAR; INTRAVENOUS at 00:55

## 2021-12-09 RX ADMIN — METOCLOPRAMIDE HYDROCHLORIDE 5 MG: 5 INJECTION INTRAMUSCULAR; INTRAVENOUS at 06:21

## 2021-12-09 RX ADMIN — CLINDAMYCIN IN 5 PERCENT DEXTROSE 900 MG: 18 INJECTION, SOLUTION INTRAVENOUS at 05:31

## 2021-12-09 RX ADMIN — TRAMADOL HYDROCHLORIDE 50 MG: 50 TABLET ORAL at 05:31

## 2021-12-09 RX ADMIN — METOCLOPRAMIDE HYDROCHLORIDE 5 MG: 5 INJECTION INTRAMUSCULAR; INTRAVENOUS at 00:55

## 2021-12-09 NOTE — PAYOR COMM NOTE
"Harry Panda (40 y.o. Female) [id number]  inpt approved .   D/C notification   D/c today 12/9  [facility] phone   Fax                Date of Birth Social Security Number Address Home Phone MRN    1980  [id number] [facility] [id number] [phone] [id number]    Sikh Marital Status             Advent        Admission Date Admission Type Admitting Provider Attending Provider Department, Room/Bed    12/8/21 Elective Kermit Marlow MD  [facility] 3C, 374/1    Discharge Date Discharge Disposition Discharge Destination          12/9/2021 Home or Self Care              Attending Provider: (none)   Allergies: Penicillins    Isolation: None   Infection: None   Code Status: CPR   Advance Care Planning Activity    Ht: 162.5 cm (63.98\")   Wt: 104 kg (229 lb 11.5 oz)    Admission Cmt: None   Principal Problem: None                Active Insurance as of 12/8/2021     Primary Coverage     Payor Plan Insurance Group Employer/Plan Group    [employer] [id number]     Payor Plan Address Payor Plan Phone Number Payor Plan Fax Number Effective Dates    [address] [phone]  1/1/2020 - None Entered    [facility] [address]       Subscriber Name Subscriber Birth Date Member ID       HARRY PANDA 1980 [id number]                 Emergency Contacts      (Rel.) Home Phone Work Phone Mobile Phone    Mitchel Panda (Spouse) [phone] -- [phone]               Operative/Procedure Notes (last 48 hours)      Kermit Marlow MD at 12/08/21 1350          GASTRIC SLEEVE LAPAROSCOPIC  Progress Note    Harry Panda  12/8/2021    Pre-op Diagnosis:   Class 3 severe obesity due to excess calories with serious comorbidity and body mass index (BMI) of 40.0 to 44.9 in adult (HCC) [E66.01, Z68.41]  Type 2 diabetes mellitus with hyperglycemia, with long-term current use " of insulin (HCC) [E11.65, Z79.4]  Essential hypertension [I10]  Mixed hyperlipidemia [E78.2]  Fatty liver [K76.0]  Preop examination [Z01.818]       Post-Op Diagnosis Codes:     * Class 3 severe obesity due to excess calories with serious comorbidity and body mass index (BMI) of 40.0 to 44.9 in adult ([facility]) [E66.01, Z68.41]     * Type 2 diabetes mellitus with hyperglycemia, with long-term current use of insulin (HCC) [E11.65, Z79.4]     * Essential hypertension [I10]     * Mixed hyperlipidemia [E78.2]     * Fatty liver [K76.0]     * Preop examination [Z01.818]    Procedure/CPT® Codes:    Procedure(s):  GASTRIC SLEEVE LAPAROSCOPIC    Surgeon(s):  Kermit Marlow MD    Anesthesia: General    Staff:   Circulator: Watson Burns RN; Cristhian Faye RN  Scrub Person: Maxi Dye; Juana Rand; Luis Antonio Rubin; Ollie Amaral       Estimated Blood Loss: minimal    Urine Voided: * No values recorded between 12/8/2021  1:31 PM and 12/8/2021  2:55 PM *    Specimens:                Specimens     ID Source Type Tests Collected By Collected At Frozen?    A Stomach Tissue · TISSUE PATHOLOGY EXAM   Kermit Marlow MD 12/8/21 1438 No    Description: Fundus of Stomach            Findings: wnl    Complications: none          Kermit Marlow MD     Date: 12/8/2021  Time: 14:56 CST        Electronically signed by Kermit Marlow MD at 12/08/21 1458     Kermit Marlow MD at 12/08/21 1350          Laparoscopic Sleeve Gastrectomy     Pre operative diagnosis: Body mass index is 39.46 kg/m².  with associated comorbidities of type 2 diabetes, hypertension, hyperlipidemia, fatty liver disease      Post op diagnosis: As above,  Indications: The patient was admitted to the hospital with history of morbid obesity.   she is scheduled for a Laparoscopic Sleeve Gastrectomy.       Surgeon: Kermit Marlow MD     Assistants: Roseanna APARICIO: Responsible for camera position and closing of wounds necessary for the completion  of our procedure.    Anesthesia: General endotracheal anesthesia    ASA Class: 3, 4          Procedure Details       After the patient was explained the alternatives, benefits, complications, and risks of the laparoscopic sleeve gastrectomy informed consent was provided.  The patient was brought to the or suite after receiving preoperative antibiotics medications and anticoagulation.  The patient was placed under general anesthesia.  The patient was then prepped and draped in standard fashion.  We performed a surgical Timeout.  A 40 Nauruan bougie was placed into the oropharynx by anesthesia and guided down into the stomach followed by placement to suction.  I then proceeded to locally anesthetize a left subcostal incision site for placement of a Veress Needle.  I placed saline to confirm position and then connected the tubing to create pneumo.  Once the pressure reached 15 mmHg I proceeded placement of my left 5 mm trocar with camera assist into the abdominal cavity.  I then placed in the  periumbilical location a 12 mm incision followed by placement of a 12 mm trocar into the abdominal cavity under direct visualization.  I identified the Veress needle and removed this under direct visualization.  I noted that the Veress needle was not involved in any intra-abdominal structures and was easily removed.  A 15 mm incision in the right upper quadrant was made for placement of a 15 mm trocar under direct visualization.  A 12 mm incision was made inferior and lateral to this site for placement of a 12 mm trocar under direct visualization into the abdominal cavity.  A 5 mm incision was placed in the subxiphoid location for placement of a 5 mm trocar under direct visualization.  I removed this trocar and replaced it with a Aman liver retractor.   Note that all skin wounds were locally anesthetized prior to making incisions for placement of the trochars.  I requested the patient be placed in reverse Trendelenburg.       The bougie was then placed to suction after positioning under direct visualization.  I dissected the fat pad near the angle of Hiss away from the diaphragm.   I created a window into the lesser sack bluntly then proceeded with ligating the greater curvature vessels starting at my most distal ligation site 4-6 cm from the pylorus.  I continued proximally along the greater curvature ligating the vessels as well as a short gastrics.  This was all accomplished with the Harmonic device.  Once completion of ligation of the vessels was obtained, I dissected away attachments found posteriorly to the stomach.  Care was made to assure no injury to the pancreas. Completion of my dissection was obtained once visualization of the posterior left crural muscle seen.  The sleeve gastrectomy was then created by firing of the stapler device to create the sleeve gastrectomy.  I utilized seam guard covering over the staplers cartridge.  My most distal staple line was approximately 4-6 cm from the pylorus and I continued proximally along the greater curvature assuring that the width from the angularis incisura was at least 3 cm.  My final proximal stapler was at least 1-2 cm from the GE junction. I then proceeded with my leak test.  This was performed by placing the patient in Trendelenburg and irrigating around the sleeve gastrectomy saline. I assessed for leaks by submerging the staple line under saline and then requested anesthesia to insufflate the bougie with air. I then assessed for air bubbles along the staple line.  Once there was no evidence of leakage. I then requested that the bougie be placed off suction and then to break its seal for easy removal.  The fluid from the abdominal cavity was suctioned away.  The bougie was then removed under direct visualization off suction and only after the suction seal was broken with air under direct visualization.  I then assessed the staple line to observe for hemostasis.  I placed  surgicel snow on the staple line. I placed a simple 3-0 Vicryl imbricating suture at the most proximal portion of the sleeve gastrectomy incorporating omentum with this stitch.  I then proceeded to remove the resected portion of the stomach through the 15 mm trocar/port assist site under direct visualization.  I reassessed for hemostasis, and once this was confirmed proceeded to closing wound site's Fascial with suture.  Closing the 15 mm trocar and the 12 mm supraumbilical trocar sites with a Owen Johnson device using an 0 Vicryl suture. The liver retractor was removed under direct visualization as well as the pneumoperitoneum and remaining trochars.  I re-locally anesthetized skin wounds prior to closure.  Skin wounds were closed with 4-0 Monocryl.  Of note prior to closing skin wounds all lap pads and instruments were accounted for at the end of the procedure.    Blue staples: 8    Findings: Within normal limits    Estimated Blood Loss:  minimal            Total IV Fluids: 1400 ml           Specimens:   Specimens     ID Source Type Tests Collected By Collected At Frozen?    A Stomach Tissue · TISSUE PATHOLOGY EXAM   Kermit Marlow MD 12/8/21 8683 No    Description: Fundus of Stomach                 Implants:   Implant Name Type Inv. Item Serial No.  Lot No. LRB No. Used Action   STPL/LN REINF ECHELONENDOPATH 60MM - [id number] Implant STPL/LN REINF ECHELONENDOPATH 60MM  [employer]  DIV OF JACOB [employer] J [id number] N/A 5 Implanted   STPL/LN REINF ECHELONENDOPATH 60MM - [id number] Implant STPL/LN REINF ECHELONENDOPATH 60MM  [employer]  DIV OF JACOB [employer] J [id number] N/A 1 Implanted   RELOAD ECHELON FLEX GST REG 3.6MM DAGMAR - [id number] Implant RELOAD ECHELON FLEX GST REG 3.6MM DAGMAR  [employer]  DIV OF JACOB AND J [id number] N/A 4 Implanted   RELOAD ECHELON FLEX GST REG 3.6MM DAGMAR - [id number] Implant RELOAD ECHELON FLEX GST REG 3.6MM DAGMAR  [employer]  DIV OF JACOB AND J [id number] N/A 4  Implanted   STPL/LN REINF ECHELONENDOPATH 60MM - [id number] Implant STPL/LN REINF ECHELONENDOPATH 60MM  [employer]  DIV OF JACOB AND JACOB [id number] N/A 2 Implanted   HEMOST ABS SURGICEL SNOW 4X4IN - [id number] Implant HEMOST ABS SURGICEL SNOW 4X4IN  [employer]  DIV OF JACOB AND JACOB  N/A 1 Implanted              Complications: None           Disposition: PACU - hemodynamically stable.           Condition: stable      Electronically signed by Kermit Marlow MD at 12/08/21 1502          Physician Progress Notes (last 24 hours)      Roseanna Miguel APRN at 12/09/21 0826          Patient is postoperative day 1 from a sleeve gastrectomy.  Patient has ambulated in the harris multiple times, has IV fluids infusing and has started her liquid diet this morning.  Patient admits mild gas pain, denies shortness of breath fever and abdominal pain.  Overall patient states that she feels that she is doing well.    Review of Systems   Constitutional: Negative.    Respiratory: Negative.    Cardiovascular: Negative.    Gastrointestinal: Positive for abdominal pain.        Tenderness at incision sites   Endocrine: Negative.    Musculoskeletal: Negative.    Psychiatric/Behavioral: Negative.      Vitals:    12/08/21 1720 12/08/21 2040 12/09/21 0043 12/09/21 0717   BP: 125/75 125/73 119/69 150/85   BP Location: Left arm Left arm Left arm Left arm   Patient Position: Lying Lying Lying Lying   Pulse: 89 99 80 84   Resp: 16 16 16 16   Temp: 98.7 °F (37.1 °C) 97.6 °F (36.4 °C) 97.9 °F (36.6 °C) 98.4 °F (36.9 °C)   TempSrc: Axillary Oral Oral Oral   SpO2: 93% 95% 96% 98%   Weight:       Height:         Physical Exam  Vitals and nursing note reviewed.   Constitutional:       Appearance: She is obese.      Comments: Sitting upright in bed.  IV fluids infusing.   Eyes:      Pupils: Pupils are equal, round, and reactive to light.   Cardiovascular:      Rate and Rhythm: Normal rate and regular rhythm.   Pulmonary:      Effort: Pulmonary effort is  normal.   Abdominal:      General: Bowel sounds are normal.      Palpations: Abdomen is soft.      Tenderness: There is abdominal tenderness.      Comments: Lap x5 with Dermabond present.  Abdominal binder present.  Mild tenderness at right lower quadrant incision site.   Musculoskeletal:         General: Normal range of motion.      Comments: No calf tenderness bilateral, no swelling to legs bilateral.   Skin:     General: Skin is warm and dry.   Neurological:      Mental Status: She is alert and oriented to person, place, and time.   Psychiatric:         Mood and Affect: Mood normal.         Behavior: Behavior normal.     Patient advised to drink 1 ounce every 15 minutes.  she is aware to drink fluids slowly and not to exceed the recommendation for fluid intake.  she is aware of the risk of diarrhea, constipation, nausea, vomiting, and pain.  she will be discharged with medications to assist with symptoms and verbalizes understanding about when to take the medications and when to call the office.  her exam today was within normal limits for postoperative day 1. she is sitting up in bed and is alert and oriented x3.   Plans are to discharge patient today per Dr. Celeste.  Patient was given a copy of all discharge instructions, dates to advance her diet, and postoperative appointments.  Patient verbalizes understanding.    Electronically signed by Roseanna Miguel APRN at 12/09/21 1032       Discharge Order (From admission, onward)     Start     Ordered    12/09/21 0956  Discharge patient  Once        Expected Discharge Date: 12/09/21    Discharge Disposition: Home or Self Care    Physician of Record for Attribution - Please select from Treatment Team: STEPHANIA CELESTE [[id number]]    Review needed by CMO to determine Physician of Record: No       Question Answer Comment   Physician of Record for Attribution - Please select from Treatment Team STEPHANIA CELESTE    Review needed by CMO to determine Physician of Record No         12/09/21 1000

## 2021-12-09 NOTE — DISCHARGE SUMMARY
"  Date of Discharge:  12/9/2021    Discharge Diagnosis: Class 3 severe obesity due to excess calories with serious comorbidity and body mass index (BMI) of 40.0 to 44.9 in adult ([facility]) [E66.01, Z68.41]  Type 2 diabetes mellitus with hyperglycemia, with long-term current use of insulin (HCC) [E11.65, Z79.4]  Essential hypertension [I10]  Mixed hyperlipidemia [E78.2]  Fatty liver [K76.0]  Preop examination [Z01.818]    Presenting Problem/History of Present Illness  Class 3 severe obesity due to excess calories with serious comorbidity and body mass index (BMI) of 40.0 to 44.9 in adult (HCC) [E66.01, Z68.41]  Type 2 diabetes mellitus with hyperglycemia, with long-term current use of insulin (HCC) [E11.65, Z79.4]  Essential hypertension [I10]  Mixed hyperlipidemia [E78.2]  Fatty liver [K76.0]  Preop examination [Z01.818]       Hospital Course  Patient is a 40 y.o. female presented with morbid obesity.  She status post laparoscopic sleeve gastrectomy.  Postoperatively the patient remained stable.  She tolerated the advancement of her diet to clears.  She ambulated and utilize her incentive spirometer.  She was discharged home in a stable condition after postoperative instructions, medications and restrictions were reviewed with the patient prior to her discharge.      Procedures Performed  Procedure(s):  GASTRIC SLEEVE LAPAROSCOPIC       Consults:   Consults       No orders found for last 30 day(s).              Condition on Discharge:  Stable    Vital Signs  /85 (BP Location: Left arm, Patient Position: Lying)   Pulse 84   Temp 98.4 °F (36.9 °C) (Oral)   Resp 16   Ht 162.5 cm (63.98\")   Wt 104 kg (229 lb 11.5 oz)   LMP 11/23/2021 (Exact Date)   SpO2 98%   Breastfeeding No Comment: Mirena IUD  BMI 39.46 kg/m²     Physical Exam:  General Appearance: alert, appears stated age and cooperative  Abdomen: normal bowel sounds, no masses, no hepatomegaly, no splenomegaly, soft non-tender, no guarding and no rebound " tenderness  Extremities: moves extremities well, no edema, no cyanosis and no redness    Discharge Disposition  Home or Self Care    Discharge Medications     Discharge Medications        New Medications        Instructions Start Date   omeprazole 20 MG capsule  Commonly known as: priLOSEC   20 mg, Oral, Daily      ondansetron ODT 4 MG disintegrating tablet  Commonly known as: Zofran ODT   4 mg, Translingual, Every 8 Hours PRN      simethicone 80 MG chewable tablet  Commonly known as: Gas-X   40 mg, Oral, Every 6 Hours PRN      traMADol 50 MG tablet  Commonly known as: Ultram   50 mg, Oral, Every 8 Hours PRN             Changes to Medications        Instructions Start Date   BASAGLAR KWIKPEN 100 UNIT/ML injection pen  What changed: how much to take   50 Units, Subcutaneous, Nightly             Continue These Medications        Instructions Start Date   Aspirin Adult Low Strength 81 MG EC tablet  Generic drug: aspirin   81 mg, Oral, Daily      BD Pen Needle Es U/F 32G X 4 MM misc  Generic drug: Insulin Pen Needle   Use as directed 4 times daily as needed      cholecalciferol 25 MCG (1000 UT) tablet  Commonly known as: VITAMIN D3   1,000 Units, Oral, Daily      coenzyme Q10 100 MG capsule   100 mg, Oral, Daily      Dexcom G6 Sensor   Use 1 each As Needed (glucose control) Every 10 days      Dexcom G6 Transmitter misc   Use 1 each Every 3 (Three) Months.      FLUoxetine 20 MG capsule  Commonly known as: PROzac   Take 1 capsule by mouth daily      HumaLOG KwikPen 100 UNIT/ML solution pen-injector  Generic drug: Insulin Lispro (1 Unit Dial)   Inject up to 20 Units under the skin into the appropriate area as directed 3 (Three) Times a Day With Meals.      MIRENA (52 MG) IU   52 mg, Intrauterine, Continuous      nitroglycerin 0.4 MG SL tablet  Commonly known as: NITROSTAT   0.4 mg, Sublingual, Every 5 Minutes PRN, Take no more than 3 doses in 15 minutes.      olmesartan-hydrochlorothiazide 40-12.5 MG per tablet  Commonly  known as: BENICAR HCT   Take 1 tablet by mouth daily      Ozempic (1 MG/DOSE) 4 MG/3ML solution pen-injector  Generic drug: Semaglutide (1 MG/DOSE)   Inject 1mg under the skin into the appropriate area as directed 1 (One) Time Per Week.      Vitamin B 12 500 MCG tablet   1 tablet, Oral, Daily             Stop These Medications      multivitamin with minerals tablet tablet     nebivolol 10 MG tablet  Commonly known as: BYSTOLIC     rosuvastatin 40 MG tablet  Commonly known as: CRESTOR              Discharge Diet:      Activity at Discharge:      Follow-up Appointments  Future Appointments   Date Time Provider Department Center   12/14/2021 10:45 AM Kermit Marlow MD [facility] PAD None   1/10/2022 10:45 AM Kermit Marlow MD [facility] None   2/7/2022  2:30 PM Guille Nixon MD [facility] PAD   3/8/2022 11:00 AM Kermit Marlow MD [facility] None   3/9/2022  2:45 PM Tad Montoya APRN [facility] MAD          Test Results Pending at Discharge  Pending Labs       Order Current Status    Tissue Pathology Exam In process             Kermit Marlow MD  12/09/21  10:00 CST

## 2021-12-09 NOTE — PLAN OF CARE
Goal Outcome Evaluation:  Plan of Care Reviewed With: patient        Progress: no change  Outcome Summary: Pt c/o mild pain, no requests for pain meds at this time; up ad mishel; voiding; IVF; O2@2L; scheduled zofran/reglan; scds; lap x6 with dermabond, binder; safety maintained.

## 2021-12-09 NOTE — PROGRESS NOTES
Patient is postoperative day 1 from a sleeve gastrectomy.  Patient has ambulated in the harris multiple times, has IV fluids infusing and has started her liquid diet this morning.  Patient admits mild gas pain, denies shortness of breath fever and abdominal pain.  Overall patient states that she feels that she is doing well.    Review of Systems   Constitutional: Negative.    Respiratory: Negative.    Cardiovascular: Negative.    Gastrointestinal: Positive for abdominal pain.        Tenderness at incision sites   Endocrine: Negative.    Musculoskeletal: Negative.    Psychiatric/Behavioral: Negative.      Vitals:    12/08/21 1720 12/08/21 2040 12/09/21 0043 12/09/21 0717   BP: 125/75 125/73 119/69 150/85   BP Location: Left arm Left arm Left arm Left arm   Patient Position: Lying Lying Lying Lying   Pulse: 89 99 80 84   Resp: 16 16 16 16   Temp: 98.7 °F (37.1 °C) 97.6 °F (36.4 °C) 97.9 °F (36.6 °C) 98.4 °F (36.9 °C)   TempSrc: Axillary Oral Oral Oral   SpO2: 93% 95% 96% 98%   Weight:       Height:         Physical Exam  Vitals and nursing note reviewed.   Constitutional:       Appearance: She is obese.      Comments: Sitting upright in bed.  IV fluids infusing.   Eyes:      Pupils: Pupils are equal, round, and reactive to light.   Cardiovascular:      Rate and Rhythm: Normal rate and regular rhythm.   Pulmonary:      Effort: Pulmonary effort is normal.   Abdominal:      General: Bowel sounds are normal.      Palpations: Abdomen is soft.      Tenderness: There is abdominal tenderness.      Comments: Lap x5 with Dermabond present.  Abdominal binder present.  Mild tenderness at right lower quadrant incision site.   Musculoskeletal:         General: Normal range of motion.      Comments: No calf tenderness bilateral, no swelling to legs bilateral.   Skin:     General: Skin is warm and dry.   Neurological:      Mental Status: She is alert and oriented to person, place, and time.   Psychiatric:         Mood and Affect: Mood  normal.         Behavior: Behavior normal.     Patient advised to drink 1 ounce every 15 minutes.  she is aware to drink fluids slowly and not to exceed the recommendation for fluid intake.  she is aware of the risk of diarrhea, constipation, nausea, vomiting, and pain.  she will be discharged with medications to assist with symptoms and verbalizes understanding about when to take the medications and when to call the office.  her exam today was within normal limits for postoperative day 1. she is sitting up in bed and is alert and oriented x3.   Plans are to discharge patient today per Dr. Marlow.  Patient was given a copy of all discharge instructions, dates to advance her diet, and postoperative appointments.  Patient verbalizes understanding.

## 2021-12-10 ENCOUNTER — READMISSION MANAGEMENT (OUTPATIENT)
Dept: CALL CENTER | Facility: HOSPITAL | Age: 41
End: 2021-12-10

## 2021-12-10 NOTE — OUTREACH NOTE
Prep Survey      Responses   Religion facility patient discharged from? [facility]   Is LACE score < 7 ? No   Emergency Room discharge w/ pulse ox? No   Eligibility Readm Mgmt   Discharge diagnosis laparoscopic sleeve gastrectomy   Does the patient have one of the following disease processes/diagnoses(primary or secondary)? General Surgery   Does the patient have Home health ordered? No   Is there a DME ordered? No   Prep survey completed? Yes          Callie Montoya RN

## 2021-12-13 ENCOUNTER — TELEPHONE (OUTPATIENT)
Dept: INTERNAL MEDICINE | Age: 41
End: 2021-12-13

## 2021-12-13 ENCOUNTER — TELEPHONE (OUTPATIENT)
Dept: BARIATRICS/WEIGHT MGMT | Facility: CLINIC | Age: 41
End: 2021-12-13

## 2021-12-13 NOTE — TELEPHONE ENCOUNTER
[facility] Transitions Initial Follow Up Call    Outreach made within 2 business days of discharge: Yes    Patient: Héctor Dyson   Patient : 1980  MRN: [id number]    Reason for Admission: Admitted 21 for gastric sleeve   Discharge Date: 21  Discharge Diagnosis:  Status post laparoscopic sleeve gastrectomy (Primary Dx); Class 3 severe obesity due to excess calories with serious comorbidity and body mass index (BMI) of 40.0 to 44.9 in adult [facility]); Type 2 diabetes mellitus with hyperglycemia, with long-term current use of insulin ([facility] [address] Essential hypertension;   Mixed hyperlipidemia; Fatty liver;      Spoke with: Attempted to make contact with patient/caregiver for an initial transitions of care follow up call post discharge without success. I will reach out again at a later time. Any previously scheduled hospital follow up appointments noted below.       Discharge department/facility: [facility]       Follow Up  Future Appointments   Date Time Provider Eliana Moser   5/3/2022  4:15 PM Mayte Reich MD [address]

## 2021-12-13 NOTE — TELEPHONE ENCOUNTER
[facility] Transitions Initial Follow Up Call    Outreach made within 2 business days of discharge: Yes    Patient: Tabitha Ac            Patient : 1980      MRN: [id number]    Reason for Admission: Admitted 21 for gastric sleeve   Discharge Date: 21  Discharge Diagnosis:  Status post laparoscopic sleeve gastrectomy (Primary Dx); Class 3 severe obesity due to excess calories with serious comorbidity and body mass index (BMI) of 40.0 to 44.9 in adult [facility]); Type 2 diabetes mellitus with hyperglycemia, with long-term current use of insulin ([facility] [address] Essential hypertension;   Mixed hyperlipidemia; Fatty liver;                 Spoke with: patient      Discharge department/facility: [facility] Interactive Patient Contact:  Was patient able to fill all prescriptions: Yes  Was patient instructed to bring all medications to the follow-up visit: Yes  Is patient taking all medications as directed in the discharge summary? Yes  Does patient understand their discharge instructions: Yes  Does patient have questions or concerns that need addressed prior to 7-14 day follow up office visit: no    I spoke with Mrs. Yaneth Adame. She states everything went well with her surgery and she is doing great. She states she has stopped her insulin as her blood sugar was running too low and states it has been great without the insulin. She is following her post op diet. She follows up with her surgeon tomorrow. She denies any needs at this time. She states she is going back to work on 12/15 and cannot take off again so soon. She has asked me to cancel her hospital follow up and move it to the first of the year. I have done so and advised her to call or follow up sooner for any issues.      Scheduled appointment with PCP within 7-14 days    Follow Up  Future Appointments   Date Time Provider Eliana Moser   2021  9:00 AM Gavin Quintanilla MD [facility]   5/3/2022  4:15 PM Gavin Quintanilla MD [address]

## 2021-12-14 ENCOUNTER — READMISSION MANAGEMENT (OUTPATIENT)
Dept: CALL CENTER | Facility: HOSPITAL | Age: 41
End: 2021-12-14

## 2021-12-14 ENCOUNTER — OFFICE VISIT (OUTPATIENT)
Dept: BARIATRICS/WEIGHT MGMT | Facility: CLINIC | Age: 41
End: 2021-12-14

## 2021-12-14 VITALS
DIASTOLIC BLOOD PRESSURE: 70 MMHG | OXYGEN SATURATION: 96 % | WEIGHT: 221.8 LBS | TEMPERATURE: 97.8 F | HEART RATE: 81 BPM | BODY MASS INDEX: 37.87 KG/M2 | SYSTOLIC BLOOD PRESSURE: 96 MMHG | HEIGHT: 64 IN

## 2021-12-14 DIAGNOSIS — E66.01 CLASS 3 SEVERE OBESITY DUE TO EXCESS CALORIES WITH SERIOUS COMORBIDITY AND BODY MASS INDEX (BMI) OF 40.0 TO 44.9 IN ADULT (HCC): Chronic | ICD-10-CM

## 2021-12-14 DIAGNOSIS — Z98.84 STATUS POST LAPAROSCOPIC SLEEVE GASTRECTOMY: Primary | ICD-10-CM

## 2021-12-14 PROCEDURE — 99024 POSTOP FOLLOW-UP VISIT: CPT | Performed by: SURGERY

## 2021-12-14 NOTE — OUTREACH NOTE
General Surgery Week 1 Survey      Responses   [facility] patient discharged from? [place]   Does the patient have one of the following disease processes/diagnoses(primary or secondary)? General Surgery   Week 1 attempt successful? Yes   Call start time 1556   Call end time 1558   Discharge diagnosis laparoscopic sleeve gastrectomy   Meds reviewed with patient/caregiver? Yes   Is the patient having any side effects they believe may be caused by any medication additions or changes? No   Does the patient have all medications related to this admission filled (includes all antibiotics, pain medications, etc.) Yes   Is the patient taking all medications as directed (includes completed medication regime)? Yes   Does the patient have a follow up appointment scheduled with their surgeon? Yes   Has the patient kept scheduled appointments due by today? Yes   Comments Was at appt today and has appt with PCP   Psychosocial issues? No   Did the patient receive a copy of their discharge instructions? Yes   Nursing interventions Reviewed instructions with patient   What is the patient's perception of their health status since discharge? Improving   Nursing interventions Nurse provided patient education   Is the patient /caregiver able to teach back basic post-op care? Practice 'cough and deep breath',  Drive as instructed by MD in discharge instructions,  No tub bath, swimming, or hot tub until instructed by MD,  Keep incision areas clean,dry and protected,  Lifting as instructed by MD in discharge instructions   Is the patient/caregiver able to teach back signs and symptoms of incisional infection? Increased redness, swelling or pain at the incisonal site,  Increased drainage or bleeding,  Incisional warmth,  Pus or odor from incision,  Fever   Is the patient/caregiver able to teach back steps to recovery at home? Set small, achievable goals for return to baseline health,  Rest and rebuild strength, gradually increase  activity   If the patient is a current smoker, are they able to teach back resources for cessation? Not a smoker   Is the patient/caregiver able to teach back the hierarchy of who to call/visit for symptoms/problems? PCP, Specialist, Home health nurse, Urgent Care, ED, 911 Yes   Additional teach back comments States she is doing well   Week 1 call completed? Yes   Wrap up additional comments Denies questions or needs at this time          Elizabeth Diaz LPN

## 2021-12-16 RX ORDER — SEMAGLUTIDE 1.34 MG/ML
1 INJECTION, SOLUTION SUBCUTANEOUS WEEKLY
Qty: 3 ML | Refills: 3 | Status: CANCELLED | OUTPATIENT
Start: 2021-12-16

## 2021-12-17 RX ORDER — SEMAGLUTIDE 1.34 MG/ML
1 INJECTION, SOLUTION SUBCUTANEOUS WEEKLY
Qty: 3 ML | Refills: 3 | Status: CANCELLED | OUTPATIENT
Start: 2021-12-16

## 2021-12-23 DIAGNOSIS — E11.9 TYPE 2 DIABETES MELLITUS WITHOUT COMPLICATION, WITHOUT LONG-TERM CURRENT USE OF INSULIN (HCC): ICD-10-CM

## 2021-12-23 RX ORDER — SEMAGLUTIDE 1.34 MG/ML
1 INJECTION, SOLUTION SUBCUTANEOUS WEEKLY
Qty: 6 PEN | Refills: 1 | Status: SHIPPED
Start: 2021-12-23 | End: 2022-01-05 | Stop reason: CLARIF

## 2021-12-23 RX ORDER — ROSUVASTATIN CALCIUM 40 MG/1
40 TABLET, COATED ORAL DAILY
Qty: 90 TABLET | Refills: 1 | Status: SHIPPED | OUTPATIENT
Start: 2021-12-23 | End: 2022-09-07 | Stop reason: SDUPTHER

## 2021-12-23 RX ORDER — OLMESARTAN MEDOXOMIL AND HYDROCHLOROTHIAZIDE 40/12.5 40; 12.5 MG/1; MG/1
1 TABLET ORAL DAILY
Qty: 90 TABLET | Refills: 1 | Status: SHIPPED | OUTPATIENT
Start: 2021-12-23 | End: 2022-01-12 | Stop reason: ALTCHOICE

## 2021-12-23 NOTE — TELEPHONE ENCOUNTER
Cameron called requesting a refill of the below medication which has been pended for you:     Requested Prescriptions     Signed Prescriptions Disp Refills    rosuvastatin (CRESTOR) 40 MG tablet 90 tablet 1     Sig: Take 1 tablet by mouth daily     Authorizing Provider: CLAIR Funk     Ordering User: Samir Ro olmesartan-hydroCHLOROthiazide (BENICAR HCT) 40-12.5 MG per tablet 90 tablet 1     Sig: Take 1 tablet by mouth daily     Authorizing Provider: CLAIR Funk     Ordering User: Samir Ro Semaglutide, 1 MG/DOSE, (OZEMPIC, 1 MG/DOSE,) 2 MG/1.5ML SOPN 6 pen 1     Sig: Inject 1 mg into the skin once a week     Authorizing Provider: Malorie Avila     Ordering User: Pebbles Rios       Last Appointment Date: 10/20/2021  Next Appointment Date: 1/6/2022    Allergies   Allergen Reactions    Penicillins      unknown

## 2022-01-05 ENCOUNTER — OFFICE VISIT (OUTPATIENT)
Dept: INTERNAL MEDICINE | Age: 42
End: 2022-01-05
Payer: COMMERCIAL

## 2022-01-05 VITALS
DIASTOLIC BLOOD PRESSURE: 78 MMHG | RESPIRATION RATE: 18 BRPM | BODY MASS INDEX: 38.44 KG/M2 | HEART RATE: 78 BPM | OXYGEN SATURATION: 98 % | WEIGHT: 217 LBS | SYSTOLIC BLOOD PRESSURE: 128 MMHG

## 2022-01-05 DIAGNOSIS — Z90.3 STATUS POST SLEEVE GASTRECTOMY: Primary | ICD-10-CM

## 2022-01-05 DIAGNOSIS — E55.9 VITAMIN D DEFICIENCY: ICD-10-CM

## 2022-01-05 DIAGNOSIS — E11.9 TYPE 2 DIABETES MELLITUS WITHOUT COMPLICATION, WITHOUT LONG-TERM CURRENT USE OF INSULIN (HCC): ICD-10-CM

## 2022-01-05 DIAGNOSIS — I10 ESSENTIAL HYPERTENSION: ICD-10-CM

## 2022-01-05 DIAGNOSIS — E78.2 MIXED HYPERLIPIDEMIA: ICD-10-CM

## 2022-01-05 PROCEDURE — 99214 OFFICE O/P EST MOD 30 MIN: CPT | Performed by: INTERNAL MEDICINE

## 2022-01-05 ASSESSMENT — ENCOUNTER SYMPTOMS
CONSTIPATION: 0
CHEST TIGHTNESS: 0
SORE THROAT: 0
ABDOMINAL PAIN: 0
COUGH: 0
WHEEZING: 0

## 2022-01-05 ASSESSMENT — PATIENT HEALTH QUESTIONNAIRE - PHQ9
1. LITTLE INTEREST OR PLEASURE IN DOING THINGS: 0
SUM OF ALL RESPONSES TO PHQ QUESTIONS 1-9: 0
SUM OF ALL RESPONSES TO PHQ9 QUESTIONS 1 & 2: 0
2. FEELING DOWN, DEPRESSED OR HOPELESS: 0
SUM OF ALL RESPONSES TO PHQ QUESTIONS 1-9: 0

## 2022-01-05 NOTE — PROGRESS NOTES
Chief Complaint   Patient presents with    Follow-Up from Hospital     post op gastric sleeve 12/08/2021      History of presenting illness:  Shelby Luciano is a36 y.o. female who presents today for follow up on her chronic medical conditions as noted below. Discharge from the hospital 12/8/2021  (following highlighted text has been copied from this specialist note)    Date of Discharge: 12/9/2021    Discharge Diagnosis: Class 3 severe obesity due to excess calories with serious comorbidity and body mass index (BMI) of 40.0 to 44.9 in adult ([facility] [E66.01, Z68.41]  Type 2 diabetes mellitus with hyperglycemia, with long-term current use of insulin (HCC) [E11.65, Z79.4]  Essential hypertension [I10]  Mixed hyperlipidemia [E78.2]  Fatty liver [K76.0]  Preop examination [Z01.818]    Presenting Problem/History of Present Illness  Class 3 severe obesity due to excess calories with serious comorbidity and body mass index (BMI) of 40.0 to 44.9 in adult ([facility] [E66.01, Z68.41]  Type 2 diabetes mellitus with hyperglycemia, with long-term current use of insulin (HCC) [E11.65, Z79.4]  Essential hypertension [I10]  Mixed hyperlipidemia [E78.2]  Fatty liver [K76.0]  Preop examination [Z01.818]     Hospital Course  Patient is a 36 y.o. female presented with morbid obesity. She status post laparoscopic sleeve gastrectomy. Postoperatively the patient remained stable. She tolerated the advancement of her diet to clears. She ambulated and utilize her incentive spirometer. She was discharged home in a stable condition after postoperative instructions, medications and restrictions were reviewed with the patient prior to her discharge. Procedures Performed  Procedure(s):  GASTRIC SLEEVE LAPAROSCOPIC      Consults:   Consults     No orders found for last 30 day(s). Condition on Discharge: Stable   Discharge Diet:     Activity at Discharge:      Follow-up Appointments  Future Appointments   Date Time Provider Department [place]   12/14/2021 10:45 AM Ronnell Kaiser MD [facility] PAD None   1/10/2022 10:45 AM Ronnell Kaiser MD [facility] None   2/7/2022 2:30 PM Graciela Chou MD [facility] PAD   3/8/2022 11:00 AM Ronnell Kaiser MD [facility] None   3/9/2022 2:45 PM ERNESTO Yeung [facility] MAD         Test Results Pending at Discharge  Pending Labs     Order Current Status   Tissue Pathology Exam In process       Most recent lab data  Hemoglobin A1c 7.5 in 10/2021  TSH 1.9 in 10/2021  Vitamin D 38 in 10/2021  CMP normal kidney and liver function tests in 10/2021 and in 12/2021  Before surgery H&H normal, lab has not been checked since the surgery    Patient Active Problem List    Diagnosis Date Noted    Coronary artery disease involving native coronary artery without angina pectoris 10/22/2020     Overview Note:     History of acute anterior wall MI 9/3/20  Status post insertion of drug-eluting stent into left anterior descending (LAD) artery      Medication intolerance 01/17/2019     Overview Note:     Intolerance to metformin      Generalized anxiety disorder 09/19/2017    Essential hypertension 09/18/2017    Type 2 diabetes mellitus without complication, without long-term current use of insulin ([facility] 09/18/2017    Fatty liver 09/18/2017    Morbid obesity due to excess calories ([facility] 09/18/2017    Mixed hyperlipidemia 09/18/2017    Chronic midline low back pain without sciatica 09/18/2017     No past medical history on file.    Past Surgical History:   Procedure Laterality Date    CHOLECYSTECTOMY       Current Outpatient Medications   Medication Sig Dispense Refill    rosuvastatin (CRESTOR) 40 MG tablet Take 1 tablet by mouth daily 90 tablet 1    olmesartan-hydroCHLOROthiazide (BENICAR HCT) 40-12.5 MG per tablet Take 1 tablet by mouth daily (Patient taking differently: Take 0.5 tablets by mouth daily ) 90 tablet 1    FLUoxetine (PROZAC) 20 MG capsule Take 1 capsule by mouth daily 90 capsule 1    nebivolol (BYSTOLIC) 10 MG tablet Take 1 tablet by mouth every morning 30 tablet 5    aspirin 81 MG EC tablet Take 81 mg by mouth daily      vitamin D 25 MCG (1000 UT) CAPS Take by mouth      coenzyme Q10 100 MG CAPS capsule Take 100 mg by mouth daily      nitroGLYCERIN (NITROSTAT) 0.4 MG SL tablet Place 0.4 mg under the tongue every 5 minutes as needed for Chest pain up to max of 3 total doses. If no relief after 1 dose, call 911. No current facility-administered medications for this visit. Allergies   Allergen Reactions    Penicillins      unknown     Social History     Tobacco Use    Smoking status: Former Smoker     Packs/day: 0.50     Years: 7.00     Pack years: 3.50     Types: Cigarettes     Quit date:      Years since quittin.0    Smokeless tobacco: Never Used   Substance Use Topics    Alcohol use: No      Family History   Problem Relation Age of Onset    Diabetes Mother     High Blood Pressure Mother     Colon Cancer Mother 61    Diabetes Father        Review of Systems   Constitutional: Positive for fatigue. Negative for chills and fever. HENT: Negative for congestion, ear pain, nosebleeds, postnasal drip and sore throat. Respiratory: Negative for cough, chest tightness and wheezing. Cardiovascular: Negative for chest pain, palpitations and leg swelling. Gastrointestinal: Negative for abdominal pain and constipation. Genitourinary: Negative for dysuria and urgency. Musculoskeletal: Negative. Negative for arthralgias. Skin: Negative for rash. Neurological: Negative for dizziness and headaches. Psychiatric/Behavioral: Negative. Vitals:    22 1424   BP: 128/78   Site: Left Upper Arm   Position: Sitting   Cuff Size: Large Adult   Pulse: 78   Resp: 18   SpO2: 98%   Weight: 217 lb (98.4 kg)     Body mass index is 38.44 kg/m². Physical Exam  Constitutional:       Appearance: She is well-developed.    HENT:      Right Ear: External ear normal. Left Ear: External ear normal.      Mouth/Throat:      Pharynx: No oropharyngeal exudate. Eyes:      Conjunctiva/sclera: Conjunctivae normal.      Pupils: Pupils are equal, round, and reactive to light. Neck:      Thyroid: No thyromegaly. Vascular: No JVD. Cardiovascular:      Rate and Rhythm: Normal rate. Heart sounds: Normal heart sounds. No murmur heard. Pulmonary:      Effort: No respiratory distress. Breath sounds: Normal breath sounds. No wheezing or rales. Chest:      Chest wall: No tenderness. Abdominal:      General: Bowel sounds are normal.      Palpations: Abdomen is soft. Musculoskeletal:      Cervical back: Neck supple. Lymphadenopathy:      Cervical: No cervical adenopathy. Skin:     Findings: No rash. Lab Review   No visits with results within 2 Month(s) from this visit.    Latest known visit with results is:   Orders Only on 05/05/2021   Component Date Value    TSH 05/04/2021 2.130     Vit D, 25-Hydroxy 05/04/2021 29.3     Microalbumin Creatinine * 05/04/2021 90.4     Microalb, Ur 05/04/2021 30.9     Creatinine, Urine 05/04/2021 342.0     Hemoglobin A1C 05/04/2021 8.3     Sodium 05/04/2021 139     Chloride 05/04/2021 99     Potassium 05/04/2021 4.3     BUN 05/04/2021 11     CREATININE 05/04/2021 0.66     Glucose 05/04/2021 185     AST 05/04/2021 33     ALT 05/04/2021 34     Calcium 05/04/2021 9.3     Total Protein 05/04/2021 7.7     CO2 05/04/2021 27.0     Albumin 05/04/2021 4.60     Alkaline Phosphatase 05/04/2021 65     Total Bilirubin 05/04/2021 0.7     Gfr Calculated 05/04/2021 99     Anion Gap 05/04/2021 13.0            ASSESSMENT/PLAN:    Status post sleeve gastrectomy  Doing well  Over last 1 month has lost a total 30 pounds  Under care of       Type 2 diabetes mellitus without complication, without long-term current use of insulin ([facility] [address]  A1c  7,5 in 10/2021  Has stopped currently all diabetic medications including Julio Calero and Humalog  Doing well  Plan hemoglobin A1c in May 2022    Essential hypertension  Lab results reviewed  Blood pressure readings discussed  Post bariatric surgery blood pressure has been in very good range  Patient has decreased her Benicar/HCTZ to half tablet daily  She currently continues Bystolic 10 mg daily  Monitor blood pressure closely  If she has any questions if blood pressure stays low we may need to further decrease her blood pressure medications and she will let me know    Vitamin D deficiency  Plan to check vitamin D levels in May 2022    Anxiety/stress  Fluoxetine is helpful  rx fluoxetine 20 mg daily        CAD  History of acute anterior wall MI 9/3/20  Hyperlipidemia  Status post insertion of drug-eluting stent into left anterior descending (LAD) artery  Under care of cardiology  Doing well  Suggest we decrease Crestor dose from 40 mg daily to 20 mg daily    Plan bariatric labs with wellness labs in May 2022  Q 6mo x 2 yrs, then annually  * alb/ prealbumin  * iron/ ferrtin  * b12/ folate  * calcium, PTH , vit D        No orders of the defined types were placed in this encounter. New Prescriptions    No medications on file         No follow-ups on file. There are no Patient Instructions on file for this visit. EMR Dragon/transcription disclaimer:Significant part of this  encounter note is electronic transcription/translationof spoken language to printed text. The electronic translation of spoken language may be erroneous, or at times, nonsensical words or phrases may be inadvertently transcribed.  Although I have reviewed the note for sucherrors, some may still exist.

## 2022-01-10 ENCOUNTER — OFFICE VISIT (OUTPATIENT)
Dept: BARIATRICS/WEIGHT MGMT | Facility: CLINIC | Age: 42
End: 2022-01-10

## 2022-01-10 VITALS
HEART RATE: 78 BPM | WEIGHT: 218.2 LBS | DIASTOLIC BLOOD PRESSURE: 52 MMHG | HEIGHT: 64 IN | OXYGEN SATURATION: 98 % | TEMPERATURE: 97.3 F | BODY MASS INDEX: 37.25 KG/M2 | SYSTOLIC BLOOD PRESSURE: 80 MMHG

## 2022-01-10 DIAGNOSIS — E78.2 MIXED HYPERLIPIDEMIA: Chronic | ICD-10-CM

## 2022-01-10 DIAGNOSIS — E11.65 TYPE 2 DIABETES MELLITUS WITH HYPERGLYCEMIA, WITH LONG-TERM CURRENT USE OF INSULIN: ICD-10-CM

## 2022-01-10 DIAGNOSIS — I10 ESSENTIAL HYPERTENSION: Chronic | ICD-10-CM

## 2022-01-10 DIAGNOSIS — Z79.4 TYPE 2 DIABETES MELLITUS WITH HYPERGLYCEMIA, WITH LONG-TERM CURRENT USE OF INSULIN: ICD-10-CM

## 2022-01-10 DIAGNOSIS — E66.01 CLASS 3 SEVERE OBESITY DUE TO EXCESS CALORIES WITH SERIOUS COMORBIDITY AND BODY MASS INDEX (BMI) OF 40.0 TO 44.9 IN ADULT: Primary | Chronic | ICD-10-CM

## 2022-01-10 DIAGNOSIS — Z98.84 STATUS POST LAPAROSCOPIC SLEEVE GASTRECTOMY: ICD-10-CM

## 2022-01-10 PROCEDURE — 99024 POSTOP FOLLOW-UP VISIT: CPT | Performed by: SURGERY

## 2022-01-10 RX ORDER — NEBIVOLOL 10 MG/1
TABLET ORAL
Qty: 30 TABLET | Refills: 5 | Status: SHIPPED | OUTPATIENT
Start: 2022-01-10 | End: 2022-01-12 | Stop reason: ALTCHOICE

## 2022-01-10 NOTE — TELEPHONE ENCOUNTER
Cameron called requesting a refill of the below medication which has been pended for you:     Requested Prescriptions     Pending Prescriptions Disp Refills    nebivolol (BYSTOLIC) 10 MG tablet 30 tablet 5     Sig: Take 1 tablet by mouth every morning       Last Appointment Date: 1/5/2022  Next Appointment Date: 5/3/2022    Allergies   Allergen Reactions    Penicillins      unknown

## 2022-01-10 NOTE — PROGRESS NOTES
"  Patient Care Team:  Gayatri Aquino MD as PCP - General  Gayatri Aquino MD as PCP - Family Medicine    Subjective   Harry Panda is a 41 y.o. female.     Harry is post op 1 month from her sleeve gastrectomy procedure.  She is currently on her regular diet.  Patient states that she is consuming approximately 64 ounces daily and measures her foods to be no larger than portions of a half a cup.  She eats 4 times a day accordingly.  She exercises for 30 minutes at least in the form of walking or weightlifting.  She is without complaints and states she is doing well overall.    Review Of Systems:  General ROS: positive for  - weight loss  Respiratory ROS: no cough, shortness of breath, or wheezing  Cardiovascular ROS: no chest pain or dyspnea on exertion  Gastrointestinal ROS: no abdominal pain, change in bowel habits, or black or bloody stools    The following portions of the patient's history were reviewed and updated as appropriate: allergies, current medications, past family history, past medical history, past social history, past surgical history and problem list.    Objective   BP (!) 80/52 (BP Location: Right arm, Patient Position: Sitting, Cuff Size: Adult)   Pulse 78   Temp 97.3 °F (36.3 °C)   Ht 161.9 cm (63.75\")   Wt 99 kg (218 lb 3.2 oz)   SpO2 98%   BMI 37.75 kg/m²       01/10/22  1111   Weight: 99 kg (218 lb 3.2 oz)        General Appearance:  awake, alert, oriented, in no acute distress  Abdomen:  Soft, non-tender, normal bowel sounds; no bruits, organomegaly or masses.  Abnormal shape: obese  Wounds: clean, dry, intact    Assessment/Plan     Encounter Diagnoses   Name Primary?   • Class 3 severe obesity due to excess calories with serious comorbidity and body mass index (BMI) of 40.0 to 44.9 in adult (HCC) Yes   • Status post laparoscopic sleeve gastrectomy    • Essential hypertension    • Mixed hyperlipidemia    • Type 2 diabetes mellitus with hyperglycemia, with long-term current use of " insulin (HCC)        Harry Panda and I discussed the importance of changing behavior for consistent and successful weight loss.  We first reviewed again the definition of a meal which is defined as portion sizes less than a half a cup and those portions incorporating a protein.  Specifically the protein should fill half of that volume.  I also explained that she should attempt to consume 4 meals as defined above daily and to avoid snacking or grazing.  She should also be mindful of adequate hydration by consuming at least 64 oz of water daily and incorporation of a regular exercise regimen.   I discussed the importance of taking her multivitamins as directed.  She is to return to our office 2 months or as needed.       01/10/22  13:59 CST  Patient Care Team:  Gayatri Aquino MD as PCP - General  Gayatri Aquino MD as PCP - Family Medicine  Kermit Marlow MD FACS

## 2022-01-12 ENCOUNTER — PATIENT MESSAGE (OUTPATIENT)
Dept: INTERNAL MEDICINE | Age: 42
End: 2022-01-12

## 2022-01-12 RX ORDER — OLMESARTAN MEDOXOMIL 5 MG/1
10 TABLET ORAL DAILY
Qty: 60 TABLET | Refills: 1 | Status: SHIPPED | OUTPATIENT
Start: 2022-01-12 | End: 2022-03-07

## 2022-01-12 NOTE — TELEPHONE ENCOUNTER
From: Nichole Guerra  To: Dr. Bri Pradhan: 1/12/2022 8:38 AM CST  Subject: BP meds    I had my month follow up with Dr. Kaleb Méndez Monday and while there my BP was 80/62. At home, before meds its been about 111/70. He suggested I need to go off one of my BP meds. Which one do I need to discontinue?

## 2022-01-12 NOTE — TELEPHONE ENCOUNTER
Discontinue current blood pressure medications -Bystolic and Benicar 14/89.6  Rx for Benicar 10 mg p.o. daily    Blood pressure readings to me in about 2 weeks

## 2022-01-24 ENCOUNTER — PATIENT MESSAGE (OUTPATIENT)
Dept: INTERNAL MEDICINE | Age: 42
End: 2022-01-24

## 2022-01-24 NOTE — TELEPHONE ENCOUNTER
From: Florence Espino  To: Dr. Karla Chandler: 1/24/2022 12:07 PM CST  Subject: New BP results/ pulse    I had entered almost all of it and got deleted. If you can't read it, let me know.

## 2022-01-24 NOTE — TELEPHONE ENCOUNTER
Please confirm exactly how patient has been taking her blood pressure medications last 4 days  Bystolic-discontinued?   Benicar-confirm dose

## 2022-02-07 ENCOUNTER — OFFICE VISIT (OUTPATIENT)
Dept: CARDIOLOGY | Facility: CLINIC | Age: 42
End: 2022-02-07

## 2022-02-07 VITALS
OXYGEN SATURATION: 100 % | HEART RATE: 84 BPM | DIASTOLIC BLOOD PRESSURE: 90 MMHG | WEIGHT: 213 LBS | BODY MASS INDEX: 36.37 KG/M2 | SYSTOLIC BLOOD PRESSURE: 117 MMHG | HEIGHT: 64 IN

## 2022-02-07 DIAGNOSIS — E78.2 MIXED HYPERLIPIDEMIA: Chronic | ICD-10-CM

## 2022-02-07 DIAGNOSIS — I25.10 CORONARY ARTERY DISEASE INVOLVING NATIVE CORONARY ARTERY OF NATIVE HEART WITHOUT ANGINA PECTORIS: Primary | Chronic | ICD-10-CM

## 2022-02-07 DIAGNOSIS — I10 ESSENTIAL HYPERTENSION: Chronic | ICD-10-CM

## 2022-02-07 PROCEDURE — 93000 ELECTROCARDIOGRAM COMPLETE: CPT | Performed by: INTERNAL MEDICINE

## 2022-02-07 PROCEDURE — 99214 OFFICE O/P EST MOD 30 MIN: CPT | Performed by: INTERNAL MEDICINE

## 2022-02-07 NOTE — PROGRESS NOTES
Subjective:     Encounter Date:02/07/2022      Patient ID: Harry Panda is a 41 y.o. female with coronary artery disease, status post anterior STEMI and PCI to the LAD in September 2020, hypertension, hyperlipidemia, type 2 diabetes mellitus, presenting today for follow-up.    Chief Complaint: Here today for follow-up of coronary artery disease    This patient presents today for routine follow-up of coronary artery disease.  She says that she has been doing well and feeling well.  She recently underwent gastric reduction surgery.  She has done well with the surgery.  She has lost about 30 pounds so far.  She is off all of her diabetes medications at this time.  She remains chest pain-free, although does have a history of coronary artery disease with previous PCI.  No significant shortness of breath, dyspnea on exertion.  No palpitations, lightheadedness, dizziness, syncope.  No lower extremity edema, orthopnea, PND.  Blood pressure has been well controlled.  Cholesterol has also been well controlled.  No side effects from any of her current medications.    Coronary Artery Disease  Presents for follow-up visit. Pertinent negatives include no chest pain, dizziness, leg swelling, palpitations or shortness of breath. The symptoms have been stable. Compliance with diet is variable. Compliance with exercise is variable. Compliance with medications is good.       Current Outpatient Medications:   •  aspirin 81 MG EC tablet, Take 1 tablet by mouth Daily., Disp: 30 tablet, Rfl: 11  •  Continuous Blood Gluc Sensor (Dexcom G6 Sensor), Use 1 each As Needed (glucose control) Every 10 days, Disp: 9 each, Rfl: 3  •  Continuous Blood Gluc Transmit (Dexcom G6 Transmitter) misc, Use 1 each Every 3 (Three) Months., Disp: 1 each, Rfl: 3  •  Cyanocobalamin (VITAMIN B 12) 500 MCG tablet, Take 1 tablet by mouth Daily., Disp: , Rfl:   •  FLUoxetine (PROzac) 20 MG capsule, Take 1 capsule by mouth daily, Disp: 90 capsule, Rfl:  1  •  Levonorgestrel (MIRENA, 52 MG, IU), 52 mg by Intrauterine route Continuous., Disp: , Rfl:   •  nitroglycerin (NITROSTAT) 0.4 MG SL tablet, Place 1 tablet under the tongue Every 5 (Five) Minutes As Needed for Chest Pain. Take no more than 3 doses in 15 minutes., Disp: 25 tablet, Rfl: 12  •  olmesartan (BENICAR) 5 MG tablet, Take 2 tablets by mouth Daily., Disp: 60 tablet, Rfl: 1  •  omeprazole (priLOSEC) 20 MG capsule, Take 1 capsule by mouth Daily., Disp: 30 capsule, Rfl: 0  •  rosuvastatin (CRESTOR) 40 MG tablet, Take 1 tablet by mouth nightly (Patient taking differently: Take 40 mg by mouth Daily. Takes 1/2 daily), Disp: 90 tablet, Rfl: 1  •  traMADol (Ultram) 50 MG tablet, Take 1 tablet by mouth Every 8 (Eight) Hours As Needed for Moderate Pain ., Disp: 30 tablet, Rfl: 0    Allergies   Allergen Reactions   • Penicillins Hives     Social History     Tobacco Use   • Smoking status: Former Smoker     Packs/day: 0.50     Years: 8.00     Pack years: 4.00     Types: Cigarettes     Start date: 6/20/1999     Quit date: 3/15/2006     Years since quitting: 15.9   • Smokeless tobacco: Never Used   Substance Use Topics   • Alcohol use: Not Currently     Review of Systems   Constitutional: Positive for weight loss. Negative for fever.   Cardiovascular: Negative for chest pain, dyspnea on exertion, leg swelling, orthopnea, palpitations, paroxysmal nocturnal dyspnea and syncope.   Respiratory: Negative for cough, shortness of breath and wheezing.    Hematologic/Lymphatic: Negative for adenopathy and bleeding problem.   Gastrointestinal: Negative for abdominal pain, nausea and vomiting.   Neurological: Negative for dizziness, headaches and loss of balance.       ECG 12 Lead    Date/Time: 2/7/2022 2:48 PM  Performed by: Guille Nixon MD  Authorized by: Guille Nixon MD   Comparison: compared with previous ECG from 8/4/2021  Similar to previous ECG  Rhythm: sinus rhythm  Rate: normal  BPM: 80  Conduction:  "conduction normal  T inversion: V1, V2 and V3  QRS axis: normal  Other findings: poor R wave progression    Clinical impression: abnormal EKG             Objective:     Vitals reviewed.   Constitutional:       General: Not in acute distress.     Appearance: Well-developed and not in distress.   Eyes:      Extraocular Movements: Extraocular movements intact.      Pupils: Pupils are equal, round, and reactive to light.   HENT:      Head: Normocephalic and atraumatic.   Neck:      Thyroid: No thyroid mass.      Vascular: No JVD.   Pulmonary:      Effort: Pulmonary effort is normal.      Breath sounds: Normal breath sounds. No wheezing. No rhonchi. No rales.   Cardiovascular:      Normal rate. Regular rhythm.      Murmurs: There is no murmur.      No gallop.   Pulses:     Intact distal pulses.   Edema:     Peripheral edema absent.   Abdominal:      General: Bowel sounds are normal. There is no distension.      Palpations: Abdomen is soft.      Tenderness: There is no abdominal tenderness.   Musculoskeletal: Normal range of motion.      Extremities: No clubbing present.Skin:     General: Skin is warm and dry. There is no cyanosis.      Findings: No erythema or rash.   Neurological:      Mental Status: Alert and oriented to person, place, and time.      Cranial Nerves: No cranial nerve deficit.       /90   Pulse 84   Ht 161.9 cm (63.75\")   Wt 96.6 kg (213 lb)   SpO2 100%   BMI 36.85 kg/m²     Data/Lab Review:     Lab Results   Component Value Date    CHOL 112 (L) 10/19/2021    CHLPL 157 05/17/2016    TRIG 146 10/19/2021    HDL 30 (L) 10/19/2021    LDL 56 10/19/2021     ====================================================    Echo 9/4/20:  · Left ventricular systolic function is low normal. Estimated EF appears to be in the range of 51 - 55%.  · The following left ventricular wall segments are hypokinetic: apical septal, basal inferoseptal, mid inferoseptal, mid anteroseptal and basal inferoseptal.  · Left " "ventricular wall thickness is consistent with mild concentric hypertrophy.  · No hemodynamically significant valvular abnormalities identified on this study.     ==========================================     Cardiac Cath and PCI to LAD 9/3/20:     Selective Coronary Angiography:     Left Main Coronary Artery: The left main coronary artery arises from the left coronary cusp and bifurcates into the LAD and left circumflex arteries.  The left main coronary artery is a short vessel with no significant disease.     Left Anterior Descending Artery: The left anterior descending artery arises from the distal left main coronary artery and supplies one diagonal branch before being acutely occluded in the midportion the vessel.     Left Circumflex: The left circumflex artery arises from the distal left main artery and is dominant for the posterior circulation, supplying a large caliber first obtuse marginal branch with multiple terminal branches and a PL system along with PDA.  Left circumflex system has no more than mild luminal irregularities.     Right Coronary Artery: The right coronary artery arises from the right coronary cusp and is a small caliber nondominant vessel with no significant disease.     Percutaneous Coronary Intervention to the mid left anterior descending artery:      Guide Catheter: 6 English XB 3.5  Anticoagulation: Angiomax bolus and drip  Wire(s): 0.014\" Prowater  Balloon: 2.0 x 12  Stent (s): 3.5 x 15 mm Xience      Left Heart Catheterization:  -Left ventricular pressure: 123/5 mmHg  -Aortic pressure: 125/75 mmHg     Left Ventriculography: Preserved left ventricular ejection fraction with no regional wall motion abnormalities appreciated and no significant mitral valve regurgitation noted.      Assessment:          Diagnosis Plan   1. Coronary artery disease involving native coronary artery of native heart without angina pectoris  ECG 12 Lead   2. Essential hypertension     3. Mixed hyperlipidemia   "          Plan:       1.  Coronary artery disease: Stable at this time.  The patient does not describe any ischemic symptoms at this time.  She remains on aspirin 81 mg daily and is also on statin therapy.  No changes recommended at this time.     2.  Essential hypertension: The patient's blood pressure remains well controlled at this time.  Continue olmesartan.     3.  Mixed hyperlipidemia: Most recent LDL cholesterol is referenced above, showing an LDL cholesterol at goal of less than 70.  Her Crestor has been cut in half.  She continues to tolerate this medication well.  She will have lipids checked again in May.     We will plan to see the patient back in 12 months unless otherwise needed sooner.

## 2022-03-07 RX ORDER — OLMESARTAN MEDOXOMIL 5 MG/1
10 TABLET ORAL DAILY
Qty: 60 TABLET | Refills: 3 | Status: SHIPPED | OUTPATIENT
Start: 2022-03-07 | End: 2022-05-11 | Stop reason: SDUPTHER

## 2022-03-07 NOTE — TELEPHONE ENCOUNTER
Jag Umaña called requesting a refill of the below medication which has been pended for you:     Requested Prescriptions     Pending Prescriptions Disp Refills    olmesartan (BENICAR) 5 MG tablet [Pharmacy Med Name: Olmesartan Medoxomil 5 MG Oral Tablet (BENICAR)] 60 tablet 3     Sig: Take 2 tablets by mouth Daily.        Last Appointment Date: 1/5/2022  Next Appointment Date: 5/3/2022    Allergies   Allergen Reactions    Penicillins      unknown

## 2022-03-08 ENCOUNTER — OFFICE VISIT (OUTPATIENT)
Dept: BARIATRICS/WEIGHT MGMT | Facility: CLINIC | Age: 42
End: 2022-03-08

## 2022-03-08 VITALS
WEIGHT: 213 LBS | DIASTOLIC BLOOD PRESSURE: 84 MMHG | HEART RATE: 88 BPM | BODY MASS INDEX: 36.37 KG/M2 | TEMPERATURE: 97.1 F | SYSTOLIC BLOOD PRESSURE: 125 MMHG | OXYGEN SATURATION: 98 % | HEIGHT: 64 IN

## 2022-03-08 DIAGNOSIS — E66.01 CLASS 3 SEVERE OBESITY DUE TO EXCESS CALORIES WITH SERIOUS COMORBIDITY AND BODY MASS INDEX (BMI) OF 40.0 TO 44.9 IN ADULT: Chronic | ICD-10-CM

## 2022-03-08 DIAGNOSIS — Z98.84 STATUS POST LAPAROSCOPIC SLEEVE GASTRECTOMY: Primary | ICD-10-CM

## 2022-03-08 PROCEDURE — 99024 POSTOP FOLLOW-UP VISIT: CPT | Performed by: SURGERY

## 2022-03-08 NOTE — PROGRESS NOTES
"  Patient Care Team:  Gayatri Aquino MD as PCP - General  Gayatri Aquino MD as PCP - Family Medicine    Subjective   Harry Panda is a 41 y.o. female.     Harry is post op 3 months from her sleeve gastrectomy procedure.  She is currently on her regular diet.  The patient states that she is consuming 4 meals a day with portion sizes no greater than a half a cup.  She consumes at least 64 ounces of water and 65 g of protein daily.  She exercises every day for 30 minutes or greater.  She states she is doing well overall.  And is taking her multivitamins.    Review Of Systems:  General ROS: negative  Respiratory ROS: no cough, shortness of breath, or wheezing  Cardiovascular ROS: no chest pain or dyspnea on exertion  Gastrointestinal ROS: no abdominal pain, change in bowel habits, or black or bloody stools    The following portions of the patient's history were reviewed and updated as appropriate: allergies, current medications, past family history, past medical history, past social history, past surgical history and problem list.    Objective   /84 (BP Location: Right arm, Patient Position: Sitting, Cuff Size: Adult)   Pulse 88   Temp 97.1 °F (36.2 °C)   Ht 161.9 cm (63.75\")   Wt 96.6 kg (213 lb)   SpO2 98%   BMI 36.85 kg/m²       03/08/22  1058   Weight: 96.6 kg (213 lb)        General Appearance:  awake, alert, oriented, in no acute distress  Abdomen:  Soft, non-tender  Abnormal shape: obese  Wounds: clean, dry, intact    Assessment/Plan     Encounter Diagnoses   Name Primary?   • Status post laparoscopic sleeve gastrectomy Yes   • Class 3 severe obesity due to excess calories with serious comorbidity and body mass index (BMI) of 40.0 to 44.9 in adult ([facility])        Harry Panda and I discussed the importance of changing behavior for consistent and successful weight loss.  We first reviewed again the definition of a meal which is defined as portion sizes less than a half a cup and those " portions incorporating a protein.  Specifically the protein should fill half of that volume.  I also explained that she should attempt to consume 4 meals as defined above daily and to avoid snacking or grazing.  She should also be mindful of adequate hydration by consuming at least 64 oz of water daily and incorporation of a regular exercise regimen.   I discussed the importance of taking her multivitamins as directed.  She is to return to our office 3 months or as needed.  I have also recommended the patient not become too aggressive with her exercise regimens and to allow her body to adapt to be new changes of caloric intake.  I explained that there could be a rebound effect with excessive exercise which would trigger dehydration and hunger drives which would then lead to eating more calories.    03/08/22  11:55 CST  Patient Care Team:  Gayatri Aquino MD as PCP - General  Gayatri Aquino MD as PCP - Family Medicine  Kermit Marlow MD FACS

## 2022-03-09 ENCOUNTER — TELEMEDICINE (OUTPATIENT)
Dept: ENDOCRINOLOGY | Facility: CLINIC | Age: 42
End: 2022-03-09

## 2022-03-09 DIAGNOSIS — E11.65 TYPE 2 DIABETES MELLITUS WITH HYPERGLYCEMIA, WITH LONG-TERM CURRENT USE OF INSULIN: Primary | ICD-10-CM

## 2022-03-09 DIAGNOSIS — E78.2 MIXED HYPERLIPIDEMIA: ICD-10-CM

## 2022-03-09 DIAGNOSIS — Z79.4 TYPE 2 DIABETES MELLITUS WITH HYPERGLYCEMIA, WITH LONG-TERM CURRENT USE OF INSULIN: Primary | ICD-10-CM

## 2022-03-09 DIAGNOSIS — I10 PRIMARY HYPERTENSION: ICD-10-CM

## 2022-03-09 PROCEDURE — 99214 OFFICE O/P EST MOD 30 MIN: CPT | Performed by: NURSE PRACTITIONER

## 2022-03-09 NOTE — PROGRESS NOTES
Chief Complaint  Diabetes    Subjective          Harry Darcie Panda presents to [facility] END[facility]IN[facility]  History of Present Illness     You have chosen to receive care through a telehealth visit.  Do you consent to use a video/audio connection for your medical care today? Yes          TELEHEALTH VIDEO VISIT     This a video visit due to [facility] current guidelines for social distancing due to the COVID 19 pandemic    Referring provider Gayatri Bailon MD    41 year old female presents for follow up     Reason diabetes mellitus type 2        Duration diagnosed in 2016      Also has a history of PCOS            Quality not controlled     Timing is constant     Severity is moderate               Macrovascular complications--- had MI last year from blood clot      Microvascular complications--- no neuropathy , no DR , no renal disease      Current diabetes regimen-- insulin , GLP- 1      Current glucose monitoring device     Now has the dexcom G 6         Checks 4 times daily     See below                  Review of Systems - General ROS: negative        Objective   Vital Signs:   There were no vitals taken for this visit.    Physical Exam  Neurological:      General: No focal deficit present.      Mental Status: She is alert.   Psychiatric:         Mood and Affect: Mood normal.         Thought Content: Thought content normal.         Judgment: Judgment normal.        Result Review :   The following data was reviewed by: MARKUS Gomez on 03/09/2022:  Common labs    Common Labsle 5/4/21 5/4/21 5/4/21 5/4/21 10/19/21 10/19/21 10/19/21 10/19/21 10/19/21 12/3/21 12/3/21    1026 1026 1026 1026 0844 0844 0844 0844 0844 1515 1515   Glucose   185 (A)    164 (A)    107 (A)   BUN   11    11    8   Creatinine   0.66    0.54    0.54 (A)   eGFR Non African Am   99    125    125   Sodium   139    138    137   Potassium   4.3    4.7    4.0   Chloride   99    104    102   Calcium   9.3    10.0     9.8   Albumin   4.60    4.70    4.80   Total Bilirubin   0.7    0.6    0.8   Alkaline Phosphatase   65    70    72   AST (SGOT)   33    28    23   ALT (SGPT)   34    25    24   WBC 8.40    8.20     9.97    Hemoglobin 13.6    13.8     13.9    Hematocrit 39.7    40.6     41.2    Platelets 260    225     213    Total Cholesterol        112 (A)      Triglycerides        146      HDL Cholesterol        30 (A)      LDL Cholesterol         56      Hemoglobin A1C  8.3 (A)    7.5 (A)        Microalbumin, Urine    30.9     3.6     (A) Abnormal value                      Assessment and Plan    Diagnoses and all orders for this visit:    1. Type 2 diabetes mellitus with hyperglycemia, with long-term current use of insulin (HCC) (Primary)  -     Hemoglobin A1c; Future  -     TSH; Future    2. Mixed hyperlipidemia    3. Primary hypertension             Glycemic management     Type 2 diabetes     Lab Results   Component Value Date    HGBA1C 7.5 (H) 10/19/2021                        No longer taking any medications she had gastric sleeve       No longer Ozempic 1 mg         Humalog  No longer taking      Takes  4 units up to  6 units TID before meals                           Metformin -- could not tolerate                   jardiance stopped due to chronic yeast infection and UTI                    Microvascular complication surveillance     Last eye exam--- Feb. 2021      Neuropathy--- none               Lipid management        Taking crestor 40 mg one at night               Total Cholesterol   Date Value Ref Range Status   01/14/2021 123 (L) 130 - 200 mg/dL Final            Triglycerides   Date Value Ref Range Status   01/14/2021 154 (H) 0 - 149 mg/dL Final            HDL Cholesterol   Date Value Ref Range Status   01/14/2021 39 (L) >=50 mg/dL Final            LDL Cholesterol    Date Value Ref Range Status   01/14/2021 58 0 - 99 mg/dL Final         Blood pressure management        Taking Benicar HCT 40-12.5 mg daily      Taking   Bystolic 10 mg daily            Thyroid health     Lab Results   Component Value Date    TSH 1.900 10/19/2021                Other related diabetes aspects     No results found for: [id number]              Weight management       Gastric sleeve -- Dec 8, 2021     Has lost 41lbs               Follow Up   No follow-ups on file.  Patient was given instructions and counseling regarding her condition or for health maintenance advice. Please see specific information pulled into the AVS if appropriate.         This document has been electronically signed by MARKUS Gomez on March 9, 2022 15:11 CST.

## 2022-05-06 ENCOUNTER — LAB (OUTPATIENT)
Dept: LAB | Facility: HOSPITAL | Age: 42
End: 2022-05-06

## 2022-05-06 ENCOUNTER — TRANSCRIBE ORDERS (OUTPATIENT)
Dept: ADMINISTRATIVE | Facility: HOSPITAL | Age: 42
End: 2022-05-06

## 2022-05-06 DIAGNOSIS — I25.10 CORONARY ARTERY DISEASE INVOLVING NATIVE CORONARY ARTERY OF NATIVE HEART WITHOUT ANGINA PECTORIS: ICD-10-CM

## 2022-05-06 DIAGNOSIS — E11.9 TYPE 2 DIABETES MELLITUS WITHOUT COMPLICATION, WITHOUT LONG-TERM CURRENT USE OF INSULIN: ICD-10-CM

## 2022-05-06 DIAGNOSIS — Z23 NEED FOR IMMUNIZATION AGAINST INFLUENZA: Primary | ICD-10-CM

## 2022-05-06 DIAGNOSIS — I10 ESSENTIAL HYPERTENSION: ICD-10-CM

## 2022-05-06 DIAGNOSIS — Z23 NEED FOR IMMUNIZATION AGAINST INFLUENZA: ICD-10-CM

## 2022-05-06 DIAGNOSIS — E55.9 VITAMIN D DEFICIENCY: ICD-10-CM

## 2022-05-06 DIAGNOSIS — E78.2 MIXED HYPERLIPIDEMIA: ICD-10-CM

## 2022-05-06 DIAGNOSIS — E66.01 MORBID OBESITY DUE TO EXCESS CALORIES: ICD-10-CM

## 2022-05-06 DIAGNOSIS — Z90.3 STATUS POST SLEEVE GASTRECTOMY: ICD-10-CM

## 2022-05-06 LAB
25(OH)D3 SERPL-MCNC: 50.8 NG/ML (ref 30–100)
ALBUMIN SERPL-MCNC: 4.9 G/DL (ref 3.5–5)
ALBUMIN/GLOB SERPL: 1.4 G/DL (ref 1.1–2.5)
ALP SERPL-CCNC: 64 U/L (ref 24–120)
ALT SERPL W P-5'-P-CCNC: 29 U/L (ref 0–35)
ANION GAP SERPL CALCULATED.3IONS-SCNC: 5 MMOL/L (ref 4–13)
AST SERPL-CCNC: 26 U/L (ref 7–45)
AUTO MIXED CELLS #: 0.5 10*3/MM3 (ref 0.1–2.6)
AUTO MIXED CELLS %: 7.1 % (ref 0.1–24)
BILIRUB SERPL-MCNC: 1 MG/DL (ref 0.1–1)
BILIRUB UR QL STRIP: NEGATIVE
BUN SERPL-MCNC: 12 MG/DL (ref 5–21)
BUN/CREAT SERPL: 23.5
CALCIUM SPEC-SCNC: 9.8 MG/DL (ref 8.4–10.4)
CHLORIDE SERPL-SCNC: 104 MMOL/L (ref 98–110)
CHOLEST SERPL-MCNC: 147 MG/DL (ref 130–200)
CLARITY UR: CLEAR
CO2 SERPL-SCNC: 30 MMOL/L (ref 24–31)
COLOR UR: YELLOW
CREAT SERPL-MCNC: 0.51 MG/DL (ref 0.5–1.4)
EGFRCR SERPLBLD CKD-EPI 2021: 120.4 ML/MIN/1.73
ERYTHROCYTE [DISTWIDTH] IN BLOOD BY AUTOMATED COUNT: 12 % (ref 12.3–15.4)
FERRITIN SERPL-MCNC: 182 NG/ML (ref 13–150)
FOLATE SERPL-MCNC: 18 NG/ML (ref 4.78–24.2)
GLOBULIN UR ELPH-MCNC: 3.5 GM/DL
GLUCOSE SERPL-MCNC: 131 MG/DL (ref 70–100)
GLUCOSE UR STRIP-MCNC: NEGATIVE MG/DL
HBA1C MFR BLD: 7.3 % (ref 4.8–5.9)
HCT VFR BLD AUTO: 41.2 % (ref 34–46.6)
HDLC SERPL-MCNC: 49 MG/DL
HGB BLD-MCNC: 13.9 G/DL (ref 12–15.9)
HGB UR QL STRIP.AUTO: NEGATIVE
KETONES UR QL STRIP: NEGATIVE
LDLC SERPL CALC-MCNC: 73 MG/DL (ref 0–99)
LDLC/HDLC SERPL: 1.41 {RATIO}
LEUKOCYTE ESTERASE UR QL STRIP.AUTO: NEGATIVE
LYMPHOCYTES # BLD AUTO: 2.5 10*3/MM3 (ref 0.7–3.1)
LYMPHOCYTES NFR BLD AUTO: 38.4 % (ref 19.6–45.3)
MCH RBC QN AUTO: 29.2 PG (ref 26.6–33)
MCHC RBC AUTO-ENTMCNC: 33.7 G/DL (ref 31.5–35.7)
MCV RBC AUTO: 86.6 FL (ref 79–97)
NEUTROPHILS NFR BLD AUTO: 3.5 10*3/MM3 (ref 1.7–7)
NEUTROPHILS NFR BLD AUTO: 54.5 % (ref 42.7–76)
NITRITE UR QL STRIP: NEGATIVE
PH UR STRIP.AUTO: 6 [PH] (ref 5–8)
PLATELET # BLD AUTO: 181 10*3/MM3 (ref 140–450)
PMV BLD AUTO: 11.7 FL (ref 6–12)
POTASSIUM SERPL-SCNC: 4.3 MMOL/L (ref 3.5–5.3)
PROT SERPL-MCNC: 8.4 G/DL (ref 6.3–8.7)
PROT UR QL STRIP: NEGATIVE
PTH-INTACT SERPL-MCNC: 29.4 PG/ML (ref 15–65)
RBC # BLD AUTO: 4.76 10*6/MM3 (ref 3.77–5.28)
SODIUM SERPL-SCNC: 139 MMOL/L (ref 135–145)
SP GR UR STRIP: 1.01 (ref 1–1.03)
TRIGL SERPL-MCNC: 144 MG/DL (ref 0–149)
TSH SERPL DL<=0.05 MIU/L-ACNC: 1.49 UIU/ML (ref 0.27–4.2)
UROBILINOGEN UR QL STRIP: NORMAL
VIT B12 BLD-MCNC: 937 PG/ML (ref 211–946)
VLDLC SERPL-MCNC: 25 MG/DL (ref 5–40)
WBC NRBC COR # BLD: 6.5 10*3/MM3 (ref 3.4–10.8)

## 2022-05-06 PROCEDURE — 83970 ASSAY OF PARATHORMONE: CPT

## 2022-05-06 PROCEDURE — 82746 ASSAY OF FOLIC ACID SERUM: CPT

## 2022-05-06 PROCEDURE — 82306 VITAMIN D 25 HYDROXY: CPT

## 2022-05-06 PROCEDURE — 82607 VITAMIN B-12: CPT | Performed by: INTERNAL MEDICINE

## 2022-05-06 PROCEDURE — 80050 GENERAL HEALTH PANEL: CPT

## 2022-05-06 PROCEDURE — 81003 URINALYSIS AUTO W/O SCOPE: CPT

## 2022-05-06 PROCEDURE — 82728 ASSAY OF FERRITIN: CPT | Performed by: INTERNAL MEDICINE

## 2022-05-06 PROCEDURE — 83036 HEMOGLOBIN GLYCOSYLATED A1C: CPT | Performed by: INTERNAL MEDICINE

## 2022-05-06 PROCEDURE — 36415 COLL VENOUS BLD VENIPUNCTURE: CPT | Performed by: INTERNAL MEDICINE

## 2022-05-06 PROCEDURE — 80061 LIPID PANEL: CPT

## 2022-05-11 ENCOUNTER — OFFICE VISIT (OUTPATIENT)
Dept: INTERNAL MEDICINE | Age: 42
End: 2022-05-11
Payer: COMMERCIAL

## 2022-05-11 VITALS
SYSTOLIC BLOOD PRESSURE: 112 MMHG | DIASTOLIC BLOOD PRESSURE: 88 MMHG | OXYGEN SATURATION: 98 % | HEART RATE: 95 BPM | HEIGHT: 63 IN | WEIGHT: 212 LBS | BODY MASS INDEX: 37.56 KG/M2

## 2022-05-11 DIAGNOSIS — E11.9 TYPE 2 DIABETES MELLITUS WITHOUT COMPLICATION, WITHOUT LONG-TERM CURRENT USE OF INSULIN (HCC): ICD-10-CM

## 2022-05-11 DIAGNOSIS — K76.0 FATTY LIVER: ICD-10-CM

## 2022-05-11 DIAGNOSIS — E78.2 MIXED HYPERLIPIDEMIA: ICD-10-CM

## 2022-05-11 DIAGNOSIS — I10 ESSENTIAL HYPERTENSION: ICD-10-CM

## 2022-05-11 DIAGNOSIS — I25.10 CORONARY ARTERY DISEASE INVOLVING NATIVE CORONARY ARTERY OF NATIVE HEART WITHOUT ANGINA PECTORIS: ICD-10-CM

## 2022-05-11 DIAGNOSIS — Z00.00 ANNUAL PHYSICAL EXAM: ICD-10-CM

## 2022-05-11 DIAGNOSIS — E55.9 VITAMIN D DEFICIENCY: ICD-10-CM

## 2022-05-11 DIAGNOSIS — Z90.3 STATUS POST SLEEVE GASTRECTOMY: ICD-10-CM

## 2022-05-11 DIAGNOSIS — Z12.31 VISIT FOR SCREENING MAMMOGRAM: Primary | ICD-10-CM

## 2022-05-11 PROCEDURE — 99396 PREV VISIT EST AGE 40-64: CPT | Performed by: INTERNAL MEDICINE

## 2022-05-11 RX ORDER — ESCITALOPRAM OXALATE 10 MG/1
10 TABLET ORAL DAILY
Qty: 90 TABLET | Refills: 1 | Status: SHIPPED | OUTPATIENT
Start: 2022-05-11

## 2022-05-11 RX ORDER — OLMESARTAN MEDOXOMIL 5 MG/1
10 TABLET ORAL DAILY
Qty: 60 TABLET | Refills: 3 | Status: SHIPPED | OUTPATIENT
Start: 2022-05-11 | End: 2022-09-07 | Stop reason: SDUPTHER

## 2022-05-11 ASSESSMENT — ENCOUNTER SYMPTOMS
ABDOMINAL PAIN: 0
SORE THROAT: 0
WHEEZING: 0
CHEST TIGHTNESS: 0
COUGH: 0
CONSTIPATION: 0

## 2022-05-11 NOTE — PROGRESS NOTES
Chief Complaint:   Timmy Wilcox is a 39 y.o. female who presents forcomplete physical exam.    History of Present Illness:      Timmy Wilcox is a 39 y.o. female who presents todayfor wellness visit AND follow up on her chronic medical conditions as noted below. Patient Active Problem List    Diagnosis Date Noted    Coronary artery disease involving native coronary artery without angina pectoris 10/22/2020     History of acute anterior wall MI 9/3/20  Status post insertion of drug-eluting stent into left anterior descending (LAD) artery      Medication intolerance 01/17/2019     Intolerance to metformin      Generalized anxiety disorder 09/19/2017    Essential hypertension 09/18/2017    Type 2 diabetes mellitus without complication, without long-term current use of insulin ([facility] [address] 09/18/2017    Fatty liver 09/18/2017    [facility] to [facility] 09/18/2017    Mixed hyperlipidemia 09/18/2017    Chronic midline low back pain without sciatica 09/18/2017       No past medical history on file. Past Surgical History:   Procedure Laterality Date    CHOLECYSTECTOMY         Current Outpatient Medications   Medication Sig Dispense Refill    olmesartan (BENICAR) 5 MG tablet Take 2 tablets by mouth daily 60 tablet 3    escitalopram (LEXAPRO) 10 MG tablet Take 1 tablet by mouth daily 90 tablet 1    rosuvastatin (CRESTOR) 40 MG tablet Take 1 tablet by mouth daily 90 tablet 1    aspirin 81 MG EC tablet Take 81 mg by mouth daily      vitamin D 25 MCG (1000 UT) CAPS Take by mouth      coenzyme Q10 100 MG CAPS capsule Take 100 mg by mouth daily      nitroGLYCERIN (NITROSTAT) 0.4 MG SL tablet Place 0.4 mg under the tongue every 5 minutes as needed for Chest pain up to max of 3 total doses. If no relief after 1 dose, call 911. No current facility-administered medications for this visit.      Allergies   Allergen Reactions    Penicillins      unknown       Social History     Socioeconomic History    Marital status:      Spouse name: None    Number of children: 1    Years of education: None    Highest education level: None   Occupational History    Occupation: [employer]   Tobacco Use    Smoking status: Former Smoker     Packs/day: 0.50     Years: 7.00     Pack years: 3.50     Types: Cigarettes     Quit date: 2006     Years since quittin.3    Smokeless tobacco: Never Used   Substance and Sexual Activity    Alcohol use: No    Drug use: No    Sexual activity: None   Other Topics Concern    None   Social History Narrative    One adopted son     Social Determinants of Health     Financial Resource Strain: Low Risk     Difficulty of Paying Living Expenses: Not hard at all   Food Insecurity: No Food Insecurity    Worried About Running Out of Food in the Last Year: Never true    [address] in the Last Year: Never true   Transportation Needs:     Lack of Transportation (Medical): Not on file    Lack of Transportation (Non-Medical):  Not on file   Physical Activity:     Days of Exercise per Week: Not on file    Minutes of Exercise per Session: Not on file   Stress:     Feeling of Stress : Not on file   Social Connections:     Frequency of Communication with Friends and Family: Not on file    Frequency of Social Gatherings with Friends and Family: Not on file    Attends Restorationist Services: Not on file    Active Member of [address] [place] [address] [place] city [place] [employer] or Organizations: Not on file    Attends Club or Organization Meetings: Not on file    Marital Status: Not on file   Intimate Partner Violence:     Fear of Current or Ex-Partner: Not on file    Emotionally Abused: Not on file    Physically Abused: Not on file    Sexually Abused: Not on file   Housing Stability:     Unable to Pay for Housing in the Last Year: Not on file    Number of [place] in the Last Year: Not on file    Unstable Housing in the Last Year: Not on file     Family History   Problem Relation Age of Onset    Diabetes Mother     High Blood Pressure Mother     Colon Cancer Mother 61    Diabetes Father           Past Surgical History:   Procedure Laterality Date    CHOLECYSTECTOMY           Lab Review   No visits with results within 2 Month(s) from this visit. Latest known visit with results is:   Orders Only on 05/05/2021   Component Date Value    TSH 05/04/2021 2.130     Vit D, 25-Hydroxy 05/04/2021 29.3     Microalbumin Creatinine * 05/04/2021 90.4     Microalb, Ur 05/04/2021 30.9     Creatinine, Urine 05/04/2021 342.0     Hemoglobin A1C 05/04/2021 8.3     Sodium 05/04/2021 139     Chloride 05/04/2021 99     Potassium 05/04/2021 4.3     BUN 05/04/2021 11     CREATININE 05/04/2021 0.66     Glucose 05/04/2021 185     AST 05/04/2021 33     ALT 05/04/2021 34     Calcium 05/04/2021 9.3     Total Protein 05/04/2021 7.7     CO2 05/04/2021 27.0     Albumin 05/04/2021 4.60     Alkaline Phosphatase 05/04/2021 65     Total Bilirubin 05/04/2021 0.7     Gfr Calculated 05/04/2021 99     Anion Gap 05/04/2021 13.0          Review of Systems   Constitutional: Positive for fatigue. Negative for chills and fever. HENT: Negative for congestion, ear pain, nosebleeds, postnasal drip and sore throat. Respiratory: Negative for cough, chest tightness and wheezing. Cardiovascular: Negative for chest pain, palpitations and leg swelling. Gastrointestinal: Negative for abdominal pain and constipation. Genitourinary: Negative for dysuria and urgency. Musculoskeletal: Negative. Negative for arthralgias. Skin: Negative for rash. Neurological: Negative for dizziness and headaches. Psychiatric/Behavioral: Negative. Vitals:    05/11/22 1338   BP: 112/88   Pulse: 95   SpO2: 98%   Weight: 212 lb (96.2 kg)   Height: 5' 3\" (1.6 m)      Wt Readings from Last 3 Encounters:   05/11/22 212 lb (96.2 kg)   01/05/22 217 lb (98.4 kg)   10/20/21 239 lb (108.4 kg)   Body mass index is 37.55 kg/m².   BP Readings from Last 3 Encounters:   05/11/22 112/88   01/05/22 128/78   10/20/21 110/72       Physical Exam  Constitutional:       Appearance: She is well-developed. HENT:      Right Ear: External ear normal.      Left Ear: External ear normal.      Mouth/Throat:      Pharynx: No oropharyngeal exudate. Eyes:      Conjunctiva/sclera: Conjunctivae normal.      Pupils: Pupils are equal, round, and reactive to light. Neck:      Thyroid: No thyromegaly. Vascular: No JVD. Cardiovascular:      Rate and Rhythm: Normal rate. Heart sounds: Normal heart sounds. No murmur heard. Pulmonary:      Effort: No respiratory distress. Breath sounds: Normal breath sounds. No wheezing or rales. Chest:      Chest wall: No tenderness. Abdominal:      General: Bowel sounds are normal.      Palpations: Abdomen is soft. Musculoskeletal:      Cervical back: Neck supple. Lymphadenopathy:      Cervical: No cervical adenopathy. Skin:     General: Skin is warm. Findings: No rash. Neurological:      Mental Status: She is oriented to person, place, and time. ASSESSMENT/PLAN  Annual physical exam  * pap per [facility]YN  * mammogram /breast exam per [facility]YN  * pneumovax 12/17  *cscope  2021 5 yrs    Type 2 diabetes mellitus without complication     V7H 7.3 in 5/2022  a1c 7.5 in 10/2022  Was  8.3 in 5/2021  Was unable to take Victoza  Any dose- nausea  Started ozempic early 2021 but discontinued  Since blood sugars are remaining  uncontrolled patient was subsequently referred by me to endocrinology  Patient has seen [facility] endocrinology just recently May 2021  Recommendations as of May 2022     1.   Restart Ozempic, then increase to 1 mg weekly - rx sent  # 2 strict diet and wt loss  # 3 exercise  #4 a1c repeat 3 mo per endo     Essential hypertension  Lab results reviewed  Blood pressure readings discussed  Kidney function normal  Rx  Benicar 40/12.5     Anxiety/stress  We will change from fluoxetine back to Lexapro  DC fluoxetine  Rx Lexapro 10 mg p.o. daily        CAD  History of acute anterior wall MI 9/3/20  Status post insertion of drug-eluting stent into left anterior descending (LAD) artery  Under care of cardiology  Doing well     Obesity  Pt was givencounseling about diet and exercise including education on what calories are, where calories come from, the need for portion control,following lower carbohydrate dietary regimen and healthy snacks along side an active lifestyle with supplementary exercise of approx 30 minutes a week, 5 days a week of exercise for weight loss.  The patient voiced increased understanding of the topics discussed. Planning bariatric surgery fall 2021        Mixed hyperlipidemia-   triglycerides nl  LDL 56 in 5/2022  Continue Crestor 40 + diet     Vit d  Level 50 in 5/2022  PTH nl  Take vit D 1000    Vit B12 937  Take b12  1 mg daily    Ferritin 182  Takes bariatric vitamin daily  observe        Orders Placed This Encounter   Procedures    DWAYNE DIGITAL SCREEN W OR WO CAD BILATERAL    Hemoglobin A1C    Comprehensive Metabolic Panel    Lipid Panel    Vitamin D 25 Hydroxy    Vitamin B12    Ferritin    CBC with Auto Differential     New Prescriptions    ESCITALOPRAM (LEXAPRO) 10 MG TABLET    Take 1 tablet by mouth daily      There are no Patient Instructions on file for this visit. Return in about 6 months (around 11/11/2022) for Medication check. EMR Dragon/transcription disclaimer:Significant part of this  encounter note is electronic transcription/translation of spoken language to printed text. The electronic translation of spoken language may beerroneous, or at times, nonsensical words or phrases may be inadvertently transcribed.  Although I have reviewed the note for such errors, some may still exist.

## 2022-07-05 ENCOUNTER — OFFICE VISIT (OUTPATIENT)
Dept: BARIATRICS/WEIGHT MGMT | Facility: CLINIC | Age: 42
End: 2022-07-05

## 2022-07-05 VITALS
HEART RATE: 98 BPM | OXYGEN SATURATION: 99 % | TEMPERATURE: 99.4 F | SYSTOLIC BLOOD PRESSURE: 125 MMHG | WEIGHT: 213.4 LBS | BODY MASS INDEX: 36.43 KG/M2 | HEIGHT: 64 IN | DIASTOLIC BLOOD PRESSURE: 86 MMHG

## 2022-07-05 DIAGNOSIS — E66.01 CLASS 3 SEVERE OBESITY DUE TO EXCESS CALORIES WITH SERIOUS COMORBIDITY AND BODY MASS INDEX (BMI) OF 40.0 TO 44.9 IN ADULT: Chronic | ICD-10-CM

## 2022-07-05 DIAGNOSIS — Z98.84 STATUS POST LAPAROSCOPIC SLEEVE GASTRECTOMY: Primary | ICD-10-CM

## 2022-07-05 PROCEDURE — 99213 OFFICE O/P EST LOW 20 MIN: CPT | Performed by: SURGERY

## 2022-07-05 NOTE — PROGRESS NOTES
"  Patient Care Team:  Gayatri Aquino MD as PCP - General  Gayatri Aquino MD as PCP - Family Medicine    Subjective   Harry Panda is a 41 y.o. female.     Harry is post op 6 months from her sleeve gastrectomy procedure.  She is currently on her regular diet.  She states she is consuming 4 meals a day with portion sizes no greater than a half a cup.  She is walking 4-5 times a week for 30 minutes.  She consumes at least 64 ounces of water and 60 g of protein daily.  She states she is doing well overall.    Review Of Systems:  General ROS: negative  Gastrointestinal ROS: no abdominal pain, change in bowel habits, or black or bloody stools    The following portions of the patient's history were reviewed and updated as appropriate: allergies, current medications, past family history, past medical history, past social history, past surgical history and problem list.    Objective   /86 (BP Location: Right arm, Patient Position: Sitting, Cuff Size: Adult)   Pulse 98   Temp 99.4 °F (37.4 °C)   Ht 161.9 cm (63.75\")   Wt 96.8 kg (213 lb 6.4 oz)   SpO2 99%   BMI 36.92 kg/m²       07/05/22  1133   Weight: 96.8 kg (213 lb 6.4 oz)        General Appearance:  awake, alert, oriented, in no acute distress  Wounds: clean, dry, intact    ASSESSMENT/ PLAN    Encounter Diagnoses   Name Primary?   • Status post laparoscopic sleeve gastrectomy Yes   • Class 3 severe obesity due to excess calories with serious comorbidity and body mass index (BMI) of 40.0 to 44.9 in adult (HCC)      Harry Panda and I discussed the importance of changing behavior for consistent and successful weight loss.  We first reviewed again the definition of a meal which is defined as portion sizes less than a half a cup and those portions incorporating a vegetable.  Specifically the vegetable should fill half of that volume.  I also explained that she should attempt to consume 4 meals as defined above daily and to avoid snacking or " grazing.  She should also be mindful of adequate hydration by consuming at least 64 oz of water daily and incorporation of a regular exercise regimen.     I discussed the importance of taking her multivitamins as directed.  We discussed the importance to be aware of foods that might have an addictive component with their behavior.  Those that are considered addictive based off of how they are consumed should be avoided.  Specifically if there is limited nutritional value and the food choice it should be avoided and substituted with something that would provide more nutrition.    She is to return to our office 3 months or as needed.    07/05/22  11:50 CDT  Patient Care Team:  Gayatri Aquino MD as PCP - General  Gayatri Aquino MD as PCP - Family Medicine  Kermit Marlow MD FACS

## 2022-09-07 RX ORDER — OLMESARTAN MEDOXOMIL 5 MG/1
10 TABLET ORAL DAILY
Qty: 60 TABLET | Refills: 3 | Status: SHIPPED | OUTPATIENT
Start: 2022-09-07

## 2022-09-07 RX ORDER — ROSUVASTATIN CALCIUM 40 MG/1
40 TABLET, COATED ORAL DAILY
Qty: 90 TABLET | Refills: 1 | Status: SHIPPED | OUTPATIENT
Start: 2022-09-07

## 2022-09-07 NOTE — TELEPHONE ENCOUNTER
Maribell Baez called requesting a refill of the below medication which has been pended for you:     Requested Prescriptions     Pending Prescriptions Disp Refills    rosuvastatin (CRESTOR) 40 MG tablet 90 tablet 1     Sig: Take 1 tablet by mouth daily    olmesartan (BENICAR) 5 MG tablet 60 tablet 3     Sig: Take 2 tablets by mouth daily       Last Appointment Date: 5/11/2022  Next Appointment Date: 11/14/2022    Allergies   Allergen Reactions    Penicillins      unknown

## 2022-10-10 ENCOUNTER — OFFICE VISIT (OUTPATIENT)
Dept: BARIATRICS/WEIGHT MGMT | Facility: CLINIC | Age: 42
End: 2022-10-10

## 2022-10-10 VITALS
HEIGHT: 64 IN | WEIGHT: 210.6 LBS | BODY MASS INDEX: 35.96 KG/M2 | DIASTOLIC BLOOD PRESSURE: 79 MMHG | HEART RATE: 86 BPM | SYSTOLIC BLOOD PRESSURE: 117 MMHG | TEMPERATURE: 98.2 F | OXYGEN SATURATION: 98 %

## 2022-10-10 DIAGNOSIS — Z98.84 STATUS POST LAPAROSCOPIC SLEEVE GASTRECTOMY: Primary | ICD-10-CM

## 2022-10-10 DIAGNOSIS — Z79.4 TYPE 2 DIABETES MELLITUS WITH HYPERGLYCEMIA, WITH LONG-TERM CURRENT USE OF INSULIN: ICD-10-CM

## 2022-10-10 DIAGNOSIS — E11.65 TYPE 2 DIABETES MELLITUS WITH HYPERGLYCEMIA, WITH LONG-TERM CURRENT USE OF INSULIN: ICD-10-CM

## 2022-10-10 DIAGNOSIS — E66.01 CLASS 3 SEVERE OBESITY DUE TO EXCESS CALORIES WITH SERIOUS COMORBIDITY AND BODY MASS INDEX (BMI) OF 40.0 TO 44.9 IN ADULT: Chronic | ICD-10-CM

## 2022-10-10 PROCEDURE — 99213 OFFICE O/P EST LOW 20 MIN: CPT | Performed by: NURSE PRACTITIONER

## 2022-10-10 NOTE — PROGRESS NOTES
"Patient Care Team:  Gayatri Aquino MD as PCP - General  Gayatri Aquino MD as PCP - Family Medicine    Chief Complaint: S/P 10 months     Subjective     Harry Panda is POD 10 months from a sleeve gastrectomy.  She states she is drinking at least 64 ounces of water each day and eats around 40-50 grams pf protein each day. She states she exercises 3-4 times per week by doing Shahrzad.  She has lost 3 lbs since her last visit with us.        Review of Systems:     Review of Systems   Constitutional: Negative.    Respiratory: Negative.    Cardiovascular: Negative.    Gastrointestinal: Negative.    Endocrine: Negative.    Musculoskeletal: Negative.    Psychiatric/Behavioral: Negative.         Objective     Vital Signs  /79   Pulse 86   Temp 98.2 °F (36.8 °C)   Ht 161.9 cm (63.75\")   Wt 95.5 kg (210 lb 9.6 oz)   SpO2 98%   BMI 36.43 kg/m²     Physical Exam:    Physical Exam  Vitals reviewed.   Constitutional:       Appearance: She is obese.   Cardiovascular:      Rate and Rhythm: Normal rate and regular rhythm.   Pulmonary:      Effort: Pulmonary effort is normal.   Abdominal:      General: Bowel sounds are normal.      Palpations: Abdomen is soft.   Musculoskeletal:         General: Normal range of motion.   Skin:     General: Skin is warm and dry.   Neurological:      Mental Status: She is alert and oriented to person, place, and time.   Psychiatric:         Mood and Affect: Mood normal.         Behavior: Behavior normal.           Results Review:    Labs reviewed    Medication Reviewed:   Current Outpatient Medications   Medication Sig Dispense Refill   • aspirin 81 MG EC tablet Take 1 tablet by mouth Daily. 30 tablet 11   • escitalopram (LEXAPRO) 10 MG tablet Take 1 tablet by mouth daily 90 tablet 1   • Levonorgestrel (MIRENA, 52 MG, IU) 52 mg by Intrauterine route Continuous.     • olmesartan (BENICAR) 5 MG tablet Take 2 tablets by mouth daily 60 tablet 3   • rosuvastatin (CRESTOR) 40 MG tablet Take 1 " tablet by mouth daily 90 tablet 1   • Continuous Blood Gluc Sensor (Dexcom G6 Sensor) Use 1 each As Needed (glucose control) Every 10 days 9 each 3   • Continuous Blood Gluc Transmit (Dexcom G6 Transmitter) misc Use 1 each Every 3 (Three) Months. 1 each 3   • Cyanocobalamin (VITAMIN B 12) 500 MCG tablet Take 1 tablet by mouth Daily.     • nitroglycerin (NITROSTAT) 0.4 MG SL tablet Place 1 tablet under the tongue Every 5 (Five) Minutes As Needed for Chest Pain. Take no more than 3 doses in 15 minutes. 25 tablet 12   • olmesartan (BENICAR) 5 MG tablet Take 2 tablets by mouth Daily. 60 tablet 3   • olmesartan (BENICAR) 5 MG tablet Take 2 tablets by mouth daily 60 tablet 3   • omeprazole (priLOSEC) 20 MG capsule Take 1 capsule by mouth Daily. 30 capsule 0   • rosuvastatin (CRESTOR) 40 MG tablet Take 1 tablet by mouth nightly (Patient taking differently: Take 1 tablet by mouth Daily. Takes 1/2 daily) 90 tablet 1   • traMADol (Ultram) 50 MG tablet Take 1 tablet by mouth Every 8 (Eight) Hours As Needed for Moderate Pain . 30 tablet 0     No current facility-administered medications for this visit.       Assessment & Plan  POD 10 months from  A sleeve gastrectomy.  Diagnoses and all orders for this visit:    1. Status post laparoscopic sleeve gastrectomy (Primary)  Comments:  Postoperative meal plan reviewed.    2. Class 3 severe obesity due to excess calories with serious comorbidity and body mass index (BMI) of 40.0 to 44.9 in adult ([facility])  Comments:  Encouraged to continue 4 meals per day and to measure each meal.    3. Type 2 diabetes mellitus with hyperglycemia, with long-term current use of insulin ([facility])  Comments:  Continue to check glucose levels as needed.  Follow-up with PCP as needed.           Harry Panda and I discussed the importance of changing behavior for consistent and successful weight loss.  We first reviewed again the definition of a meal which is defined as portion sizes less than a half a cup  and those portions incorporating a protein.  Specifically the protein should fill half of that volume.  I also explained that she should attempt to consume 4 meals as defined above daily and to avoid snacking or grazing.  She should also be mindful of adequate hydration by consuming at least 64 oz of water daily and incorporation of a regular exercise regimen.   I discussed the importance of taking her multivitamins as directed.  She is to return to our office in 2 to 3 months for her annual appointment or as needed.  I explained that I would order lab work at her next appointment.  Patient has been encouraged to continue working on stress reduction to assist with personal stressors.  Patient has been encouraged to continue attending monthly support groups.    Roseanna Miguel, MARKUS  10/11/22  13:37 CDT

## 2022-11-07 RX ORDER — ESCITALOPRAM OXALATE 10 MG/1
10 TABLET ORAL DAILY
Qty: 90 TABLET | Refills: 0 | Status: SHIPPED | OUTPATIENT
Start: 2022-11-07 | End: 2022-11-14 | Stop reason: SDUPTHER

## 2022-11-07 NOTE — TELEPHONE ENCOUNTER
Last Appointment Date: 5/11/2022  Next Appointment Date: 11/14/2022    Allergies   Allergen Reactions    Penicillins      unknown

## 2022-11-09 ENCOUNTER — TRANSCRIBE ORDERS (OUTPATIENT)
Dept: ADMINISTRATIVE | Facility: HOSPITAL | Age: 42
End: 2022-11-09

## 2022-11-09 ENCOUNTER — LAB (OUTPATIENT)
Dept: LAB | Facility: HOSPITAL | Age: 42
End: 2022-11-09

## 2022-11-09 DIAGNOSIS — Z90.3 STATUS POST SLEEVE GASTRECTOMY: ICD-10-CM

## 2022-11-09 DIAGNOSIS — E55.9 VITAMIN D DEFICIENCY: ICD-10-CM

## 2022-11-09 DIAGNOSIS — Z00.00 ANNUAL PHYSICAL EXAM: ICD-10-CM

## 2022-11-09 DIAGNOSIS — E78.2 MIXED HYPERLIPIDEMIA: ICD-10-CM

## 2022-11-09 DIAGNOSIS — I10 ESSENTIAL HYPERTENSION: ICD-10-CM

## 2022-11-09 DIAGNOSIS — E11.9 TYPE 2 DIABETES MELLITUS WITHOUT COMPLICATION, UNSPECIFIED WHETHER LONG TERM INSULIN USE: ICD-10-CM

## 2022-11-09 DIAGNOSIS — K76.0 FATTY LIVER: ICD-10-CM

## 2022-11-09 DIAGNOSIS — I25.119 CORONARY ARTERY DISEASE INVOLVING NATIVE CORONARY ARTERY OF NATIVE HEART WITH ANGINA PECTORIS: ICD-10-CM

## 2022-11-09 DIAGNOSIS — Z12.31 VISIT FOR SCREENING MAMMOGRAM: Primary | ICD-10-CM

## 2022-11-09 DIAGNOSIS — Z12.31 VISIT FOR SCREENING MAMMOGRAM: ICD-10-CM

## 2022-11-09 LAB
ALBUMIN SERPL-MCNC: 4.8 G/DL (ref 3.5–5)
ALBUMIN/GLOB SERPL: 1.5 G/DL (ref 1.1–2.5)
ALP SERPL-CCNC: 62 U/L (ref 24–120)
ALT SERPL W P-5'-P-CCNC: 27 U/L (ref 0–35)
ANION GAP SERPL CALCULATED.3IONS-SCNC: 11 MMOL/L (ref 4–13)
AST SERPL-CCNC: 25 U/L (ref 7–45)
AUTO MIXED CELLS #: 0.4 10*3/MM3 (ref 0.1–2.6)
AUTO MIXED CELLS %: 5.9 % (ref 0.1–24)
BILIRUB SERPL-MCNC: 1.4 MG/DL (ref 0.1–1)
BUN SERPL-MCNC: 10 MG/DL (ref 5–21)
BUN/CREAT SERPL: 16.4
CALCIUM SPEC-SCNC: 9.6 MG/DL (ref 8.4–10.4)
CHLORIDE SERPL-SCNC: 107 MMOL/L (ref 98–110)
CHOLEST SERPL-MCNC: 160 MG/DL (ref 130–200)
CO2 SERPL-SCNC: 29 MMOL/L (ref 24–31)
CREAT SERPL-MCNC: 0.61 MG/DL (ref 0.5–1.4)
EGFRCR SERPLBLD CKD-EPI 2021: 115.4 ML/MIN/1.73
ERYTHROCYTE [DISTWIDTH] IN BLOOD BY AUTOMATED COUNT: 12.4 % (ref 12.3–15.4)
FERRITIN SERPL-MCNC: 196 NG/ML (ref 13–150)
GLOBULIN UR ELPH-MCNC: 3.3 GM/DL
GLUCOSE SERPL-MCNC: 132 MG/DL (ref 70–100)
HBA1C MFR BLD: 7 % (ref 4.8–5.9)
HCT VFR BLD AUTO: 40.2 % (ref 34–46.6)
HDLC SERPL-MCNC: 51 MG/DL
HGB BLD-MCNC: 13.6 G/DL (ref 12–15.9)
LDLC SERPL CALC-MCNC: 82 MG/DL (ref 0–99)
LDLC/HDLC SERPL: 1.53 {RATIO}
LYMPHOCYTES # BLD AUTO: 2.8 10*3/MM3 (ref 0.7–3.1)
LYMPHOCYTES NFR BLD AUTO: 39.5 % (ref 19.6–45.3)
MCH RBC QN AUTO: 29.4 PG (ref 26.6–33)
MCHC RBC AUTO-ENTMCNC: 33.8 G/DL (ref 31.5–35.7)
MCV RBC AUTO: 87 FL (ref 79–97)
NEUTROPHILS NFR BLD AUTO: 3.9 10*3/MM3 (ref 1.7–7)
NEUTROPHILS NFR BLD AUTO: 54.6 % (ref 42.7–76)
PLATELET # BLD AUTO: 205 10*3/MM3 (ref 140–450)
PMV BLD AUTO: 10.7 FL (ref 6–12)
POTASSIUM SERPL-SCNC: 4.5 MMOL/L (ref 3.5–5.3)
PROT SERPL-MCNC: 8.1 G/DL (ref 6.3–8.7)
RBC # BLD AUTO: 4.62 10*6/MM3 (ref 3.77–5.28)
SODIUM SERPL-SCNC: 147 MMOL/L (ref 135–145)
TRIGL SERPL-MCNC: 156 MG/DL (ref 0–149)
VIT B12 BLD-MCNC: 348 PG/ML (ref 211–946)
VLDLC SERPL-MCNC: 27 MG/DL (ref 5–40)
WBC NRBC COR # BLD: 7.1 10*3/MM3 (ref 3.4–10.8)

## 2022-11-09 PROCEDURE — 82607 VITAMIN B-12: CPT

## 2022-11-09 PROCEDURE — 36415 COLL VENOUS BLD VENIPUNCTURE: CPT

## 2022-11-09 PROCEDURE — 83036 HEMOGLOBIN GLYCOSYLATED A1C: CPT | Performed by: INTERNAL MEDICINE

## 2022-11-09 PROCEDURE — 80061 LIPID PANEL: CPT

## 2022-11-09 PROCEDURE — 82728 ASSAY OF FERRITIN: CPT

## 2022-11-09 PROCEDURE — 80053 COMPREHEN METABOLIC PANEL: CPT | Performed by: INTERNAL MEDICINE

## 2022-11-09 PROCEDURE — 85025 COMPLETE CBC W/AUTO DIFF WBC: CPT

## 2022-11-14 ENCOUNTER — OFFICE VISIT (OUTPATIENT)
Dept: INTERNAL MEDICINE | Age: 42
End: 2022-11-14
Payer: COMMERCIAL

## 2022-11-14 VITALS
RESPIRATION RATE: 18 BRPM | SYSTOLIC BLOOD PRESSURE: 118 MMHG | DIASTOLIC BLOOD PRESSURE: 72 MMHG | WEIGHT: 212 LBS | BODY MASS INDEX: 37.56 KG/M2 | HEIGHT: 63 IN | HEART RATE: 76 BPM | OXYGEN SATURATION: 97 %

## 2022-11-14 DIAGNOSIS — E11.9 TYPE 2 DIABETES MELLITUS WITHOUT COMPLICATION, WITHOUT LONG-TERM CURRENT USE OF INSULIN (HCC): Primary | ICD-10-CM

## 2022-11-14 DIAGNOSIS — Z90.3 STATUS POST SLEEVE GASTRECTOMY: ICD-10-CM

## 2022-11-14 DIAGNOSIS — Z11.59 NEED FOR HEPATITIS C SCREENING TEST: ICD-10-CM

## 2022-11-14 DIAGNOSIS — E53.8 B12 DEFICIENCY: ICD-10-CM

## 2022-11-14 DIAGNOSIS — R79.89 ELEVATED FERRITIN: ICD-10-CM

## 2022-11-14 DIAGNOSIS — E55.9 VITAMIN D DEFICIENCY: ICD-10-CM

## 2022-11-14 DIAGNOSIS — I25.10 CORONARY ARTERY DISEASE INVOLVING NATIVE CORONARY ARTERY OF NATIVE HEART WITHOUT ANGINA PECTORIS: ICD-10-CM

## 2022-11-14 DIAGNOSIS — E78.2 MIXED HYPERLIPIDEMIA: ICD-10-CM

## 2022-11-14 DIAGNOSIS — I10 ESSENTIAL HYPERTENSION: ICD-10-CM

## 2022-11-14 DIAGNOSIS — Z00.00 ANNUAL PHYSICAL EXAM: ICD-10-CM

## 2022-11-14 PROCEDURE — 3078F DIAST BP <80 MM HG: CPT | Performed by: INTERNAL MEDICINE

## 2022-11-14 PROCEDURE — 99214 OFFICE O/P EST MOD 30 MIN: CPT | Performed by: INTERNAL MEDICINE

## 2022-11-14 PROCEDURE — 3074F SYST BP LT 130 MM HG: CPT | Performed by: INTERNAL MEDICINE

## 2022-11-14 RX ORDER — ESCITALOPRAM OXALATE 10 MG/1
15 TABLET ORAL DAILY
Qty: 135 TABLET | Refills: 1 | Status: SHIPPED | OUTPATIENT
Start: 2022-11-14

## 2022-11-14 RX ORDER — BUSPIRONE HYDROCHLORIDE 10 MG/1
TABLET ORAL
Qty: 30 TABLET | Refills: 2 | Status: SHIPPED | OUTPATIENT
Start: 2022-11-14

## 2022-11-14 RX ORDER — ESCITALOPRAM OXALATE 10 MG/1
10 TABLET ORAL DAILY
Qty: 135 TABLET | Refills: 1 | Status: SHIPPED | OUTPATIENT
Start: 2022-11-14 | End: 2022-11-14 | Stop reason: SDUPTHER

## 2022-11-14 RX ORDER — SEMAGLUTIDE 1.34 MG/ML
INJECTION, SOLUTION SUBCUTANEOUS
COMMUNITY

## 2022-11-14 ASSESSMENT — ENCOUNTER SYMPTOMS
SORE THROAT: 0
CONSTIPATION: 0
ABDOMINAL PAIN: 0
WHEEZING: 0
COUGH: 0
CHEST TIGHTNESS: 0

## 2022-11-14 NOTE — PROGRESS NOTES
Chief Complaint   Patient presents with    6 Month Follow-Up     History of presenting illness:  [place] is a36 y.o. female who presents today for follow up on her chronic medical conditions as noted below. Patient Active Problem List    Diagnosis Date Noted    Coronary artery disease involving native coronary artery without angina pectoris 10/22/2020     Overview Note:     History of acute anterior wall MI 9/3/20  Status post insertion of drug-eluting stent into left anterior descending (LAD) artery      Medication intolerance 01/17/2019     Overview Note:     Intolerance to metformin      Generalized anxiety disorder 09/19/2017    Essential hypertension 09/18/2017    Type 2 diabetes mellitus without complication, without long-term current use of insulin ([facility] [address] 09/18/2017    Fatty liver 09/18/2017    Morbid obesity due to excess calories ([facility] 09/18/2017    Mixed hyperlipidemia 09/18/2017    Chronic midline low back pain without sciatica 09/18/2017     No past medical history on file. Past Surgical History:   Procedure Laterality Date    CHOLECYSTECTOMY       Current Outpatient Medications   Medication Sig Dispense Refill    Semaglutide, 1 MG/DOSE, (OZEMPIC, 1 MG/DOSE,) 4 MG/3ML SOPN Inject into the skin      busPIRone (BUSPAR) 10 MG tablet Take one every 12 hours as needed 30 tablet 2    escitalopram (LEXAPRO) 10 MG tablet Take 1.5 tablets by mouth daily 135 tablet 1    rosuvastatin (CRESTOR) 40 MG tablet Take 1 tablet by mouth daily 90 tablet 1    olmesartan (BENICAR) 5 MG tablet Take 2 tablets by mouth daily 60 tablet 3    aspirin 81 MG EC tablet Take 81 mg by mouth daily      vitamin D 25 MCG (1000 UT) CAPS Take by mouth      coenzyme Q10 100 MG CAPS capsule Take 100 mg by mouth daily      nitroGLYCERIN (NITROSTAT) 0.4 MG SL tablet Place 0.4 mg under the tongue every 5 minutes as needed for Chest pain up to max of 3 total doses. If no relief after 1 dose, call 911.        No current facility-administered medications for this visit. Allergies   Allergen Reactions    Penicillins      unknown     Social History     Tobacco Use    Smoking status: Former     Packs/day: 0.50     Years: 7.00     Pack years: 3.50     Types: Cigarettes     Quit date:      Years since quittin.8    Smokeless tobacco: Never   Substance Use Topics    Alcohol use: No      Family History   Problem Relation Age of Onset    Diabetes Mother     High Blood Pressure Mother     Colon Cancer Mother 61    Diabetes Father        Review of Systems   Constitutional:  Positive for fatigue. Negative for chills and fever. HENT:  Negative for congestion, ear pain, nosebleeds, postnasal drip and sore throat. Respiratory:  Negative for cough, chest tightness and wheezing. Cardiovascular:  Negative for chest pain, palpitations and leg swelling. Gastrointestinal:  Negative for abdominal pain and constipation. Genitourinary:  Negative for dysuria and urgency. Musculoskeletal: Negative. Negative for arthralgias. Skin:  Negative for rash. Neurological:  Negative for dizziness and headaches. Psychiatric/Behavioral: Negative. Vitals:    22 1512   BP: 118/72   Site: Left Upper Arm   Position: Sitting   Cuff Size: Large Adult   Pulse: 76   Resp: 18   SpO2: 97%   Weight: 212 lb (96.2 kg)   Height: 5' 3\" (1.6 m)     Body mass index is 37.55 kg/m². Physical Exam  Constitutional:       Appearance: She is well-developed. HENT:      Right Ear: External ear normal.      Left Ear: External ear normal.      Mouth/Throat:      Pharynx: No oropharyngeal exudate. Eyes:      Conjunctiva/sclera: Conjunctivae normal.      Pupils: Pupils are equal, round, and reactive to light. Neck:      Thyroid: No thyromegaly. Vascular: No JVD. Cardiovascular:      Rate and Rhythm: Normal rate. Heart sounds: Normal heart sounds. No murmur heard. Pulmonary:      Effort: No respiratory distress.       Breath sounds: Normal breath sounds. No wheezing or rales. Chest:      Chest wall: No tenderness. Abdominal:      General: Bowel sounds are normal.      Palpations: Abdomen is soft. Musculoskeletal:      Cervical back: Neck supple. Lymphadenopathy:      Cervical: No cervical adenopathy. Skin:     Findings: No rash. Neurological:      Mental Status: She is oriented to person, place, and time. Lab Review   No visits with results within 2 Month(s) from this visit. Latest known visit with results is:   Orders Only on 05/05/2021   Component Date Value    TSH 05/04/2021 2.130     Vit D, 25-Hydroxy 05/04/2021 29.3     Microalbumin Creatinine * 05/04/2021 90.4     Microalb, Ur 05/04/2021 30.9     Creatinine, Urine 05/04/2021 342.0     Hemoglobin A1C 05/04/2021 8.3     Sodium 05/04/2021 139     Chloride 05/04/2021 99     Potassium 05/04/2021 4.3     BUN 05/04/2021 11     Creatinine 05/04/2021 0.66     Glucose 05/04/2021 185     AST 05/04/2021 33     ALT 05/04/2021 34     Calcium 05/04/2021 9.3     Total Protein 05/04/2021 7.7     CO2 05/04/2021 27.0     Albumin 05/04/2021 4.60     Alkaline Phosphatase 05/04/2021 65     Total Bilirubin 05/04/2021 0.7     Gfr Calculated 05/04/2021 99     Anion Gap 05/04/2021 13.0            ASSESSMENT/PLAN:  Type 2 diabetes mellitus without complication     G4U 7.0 in 11/2022  was7.3 in 5/2022  a1c 7.5 in 10/2022  Was  8.3 in 5/2021  Was unable to take Victoza  Any dose- nausea  Started ozempic early 2021 but discontinued  Since blood sugars are remaining  uncontrolled patient was subsequently referred by me to endocrinology  Patient has seen [facility] endocrinology  Recommendations as of 11/2022     1.    Ozempic 1 mg weekly   # 2 strict diet and wt loss  # 3 exercise  #4 a1c repeat 3 mo per endo     Essential hypertension  Lab results reviewed  Blood pressure readings discussed  Kidney function normal  Rx  Benicar 40/12.5     Anxiety/stress  We will change from fluoxetine back to Lexapro  DC fluoxetine  Rx Lexapro 10 mg p.o. daily        CAD  History of acute anterior wall MI 9/3/20  Status post insertion of drug-eluting stent into left anterior descending (LAD) artery  Under care of cardiology  Doing well     Obesity  Pt was givencounseling about diet and exercise including education on what calories are, where calories come from, the need for portion control,following lower carbohydrate dietary regimen and healthy snacks along side an active lifestyle with supplementary exercise of approx 30 minutes a week, 5 days a week of exercise for weight loss. The patient voiced increased understanding of the topics discussed. Planning bariatric surgery fall 2021        Mixed hyperlipidemia-   triglycerides nl  LDL 82  RX crestor 40 + diet     Vit d  Level 50   PTH nl  Take vit D 1000 iu daily     Vit B12 level as 348  Previously 937  Add b12  1 mg daily  ( Currently only bariatric patch)     Ferritin 196 ( 182)  Hemoglobin 13.6 and hematocrit 40.2  Takes bariatric vitamin daily- patch  Observe  May need to decrease iron intake             Orders Placed This Encounter   Procedures    Hepatitis C Antibody    Hemoglobin A1C    Comprehensive Metabolic Panel    CBC with Auto Differential    Lipid Panel    Microalbumin / Creatinine Urine Ratio    Vitamin D 25 Hydroxy    Urinalysis    TSH    Ferritin    Vitamin B12     New Prescriptions    BUSPIRONE (BUSPAR) 10 MG TABLET    Take one every 12 hours as needed         Return in about 6 months (around 5/14/2023) for Annual Physical.   There are no Patient Instructions on file for this visit. EMR Dragon/transcription disclaimer:Significant part of this  encounter note is electronic transcription/translationof spoken language to printed text. The electronic translation of spoken language may be erroneous, or at times, nonsensical words or phrases may be inadvertently transcribed.  Although I have reviewed the note for sucherrors, some may still exist.

## 2022-12-05 ENCOUNTER — PATIENT MESSAGE (OUTPATIENT)
Dept: INTERNAL MEDICINE | Age: 42
End: 2022-12-05

## 2022-12-05 RX ORDER — SEMAGLUTIDE 1.34 MG/ML
1 INJECTION, SOLUTION SUBCUTANEOUS WEEKLY
Qty: 3 ML | Refills: 2 | Status: SHIPPED | OUTPATIENT
Start: 2022-12-05

## 2022-12-05 NOTE — TELEPHONE ENCOUNTER
From: Raymond Nielsen  To: Dr. Harvey Arthur: 12/5/2022 5:05 AM CST  Subject: Ozympic    I cannot request a refill for the ozympic through my chart, but I'm down to my last pen with 2 injections.

## 2022-12-05 NOTE — TELEPHONE ENCOUNTER
Xavier Reyes called requesting a refill of the below medication which has been pended for you:     Requested Prescriptions     Pending Prescriptions Disp Refills    Semaglutide, 1 MG/DOSE, (OZEMPIC, 1 MG/DOSE,) 4 MG/3ML SOPN 3 mL 2     Sig: Inject 1 mg into the skin once a week       Last Appointment Date: 11/14/2022  Next Appointment Date: 5/16/2023    Allergies   Allergen Reactions    Penicillins      unknown

## 2022-12-09 ENCOUNTER — TELEPHONE (OUTPATIENT)
Dept: INTERNAL MEDICINE | Age: 42
End: 2022-12-09

## 2022-12-09 NOTE — TELEPHONE ENCOUNTER
Spoke to [facility], Case # [id number]. They have requested that we send in [address] notes showing the patients Diagnoses of Type 2 diabetes. I have faxed this to [phone]. They said to expect turn around time to be anywhere between 3-30 days.

## 2022-12-09 NOTE — TELEPHONE ENCOUNTER
[facility] [address] called stating they are needing a PA for ozempic. Pharmacy stated do not use cover my meds for PA. Pharmacy provided phone number [phone].

## 2023-01-09 RX ORDER — OLMESARTAN MEDOXOMIL 5 MG/1
10 TABLET ORAL DAILY
Qty: 60 TABLET | Refills: 3 | Status: SHIPPED | OUTPATIENT
Start: 2023-01-09

## 2023-01-09 RX ORDER — OLMESARTAN MEDOXOMIL 5 MG/1
10 TABLET ORAL DAILY
Qty: 60 TABLET | Refills: 3 | Status: SHIPPED | OUTPATIENT
Start: 2023-01-09 | End: 2023-01-09 | Stop reason: SDUPTHER

## 2023-01-09 NOTE — TELEPHONE ENCOUNTER
Sam Apple called requesting a refill of the below medication which has been pended for you:     Requested Prescriptions     Pending Prescriptions Disp Refills    olmesartan (BENICAR) 5 MG tablet [Pharmacy Med Name: Olmesartan Medoxomil 5 MG Oral Tablet (BENICAR)] 60 tablet 3     Sig: Take 2 tablets by mouth daily       Last Appointment Date: 11/14/2022  Next Appointment Date: 5/16/2023    Allergies   Allergen Reactions    Penicillins      unknown

## 2023-01-09 NOTE — TELEPHONE ENCOUNTER
Chandler Monday called requesting a refill of the below medication which has been pended for you:     Requested Prescriptions     Pending Prescriptions Disp Refills    olmesartan (BENICAR) 5 MG tablet 60 tablet 3     Sig: Take 2 tablets by mouth daily       Last Appointment Date: 11/14/2022  Next Appointment Date: 5/16/2023    Allergies   Allergen Reactions    Penicillins      unknown

## 2023-02-08 ENCOUNTER — OFFICE VISIT (OUTPATIENT)
Dept: CARDIOLOGY | Facility: CLINIC | Age: 43
End: 2023-02-08
Payer: COMMERCIAL

## 2023-02-08 VITALS
HEART RATE: 97 BPM | DIASTOLIC BLOOD PRESSURE: 80 MMHG | HEIGHT: 64 IN | BODY MASS INDEX: 36.19 KG/M2 | SYSTOLIC BLOOD PRESSURE: 124 MMHG | WEIGHT: 212 LBS | OXYGEN SATURATION: 100 %

## 2023-02-08 DIAGNOSIS — I25.10 CORONARY ARTERY DISEASE INVOLVING NATIVE CORONARY ARTERY OF NATIVE HEART WITHOUT ANGINA PECTORIS: Primary | Chronic | ICD-10-CM

## 2023-02-08 DIAGNOSIS — I10 PRIMARY HYPERTENSION: ICD-10-CM

## 2023-02-08 DIAGNOSIS — E78.2 MIXED HYPERLIPIDEMIA: Chronic | ICD-10-CM

## 2023-02-08 PROCEDURE — 93000 ELECTROCARDIOGRAM COMPLETE: CPT | Performed by: INTERNAL MEDICINE

## 2023-02-08 PROCEDURE — 99214 OFFICE O/P EST MOD 30 MIN: CPT | Performed by: INTERNAL MEDICINE

## 2023-02-08 NOTE — PROGRESS NOTES
Subjective:     Encounter Date:02/08/2023      Patient ID: Harry Panda is a 42 y.o. female with coronary artery disease, previous anterior STEMI and PCI to the LAD in September 2020, hypertension, hyperlipidemia, type 2 diabetes mellitus who presents today for routine follow-up.    Chief Complaint: Here for routine follow-up of CAD    History of Present Illness    This patient presents today for routine follow-up of coronary artery disease.  She has been doing well and feeling well from a cardiac standpoint.  She denies having any chest pain.  Breathing is stable with no significant shortness of breath or dyspnea with exertion.  The patient denies having palpitations, lightheadedness, dizziness or syncope.  No lower extremity edema, orthopnea, PND.  She remains on aspirin 81 mg daily and has not experienced any significant bleeding issues.  Her blood pressure is well controlled on Benicar.  She is also on statin therapy.  Cholesterol has been reasonably well controlled but at last check, LDL was not quite at goal.  She is tolerating statin therapy well, however.  No side effects many medications.  Overall, from a cardiac standpoint she says that she seems to be doing well at this time.      Current Outpatient Medications:   •  aspirin 81 MG EC tablet, Take 1 tablet by mouth Daily., Disp: 30 tablet, Rfl: 11  •  busPIRone (BUSPAR) 10 MG tablet, Take 1 tablet by mouth Every 12 (Twelve) Hours As Needed., Disp: 30 tablet, Rfl: 2  •  Continuous Blood Gluc Sensor (Dexcom G6 Sensor), Use 1 each As Needed (glucose control) Every 10 days, Disp: 9 each, Rfl: 3  •  Continuous Blood Gluc Transmit (Dexcom G6 Transmitter) misc, Use 1 each Every 3 (Three) Months., Disp: 1 each, Rfl: 3  •  Cyanocobalamin (VITAMIN B 12) 500 MCG tablet, Take 1 tablet by mouth Daily., Disp: , Rfl:   •  escitalopram (LEXAPRO) 10 MG tablet, Take 1 tablet by mouth Daily., Disp: 135 tablet, Rfl: 1  •  escitalopram (LEXAPRO) 10 MG tablet, Take  1.5 tablets by mouth daily, Disp: 135 tablet, Rfl: 1  •  Levonorgestrel (MIRENA, 52 MG, IU), 52 mg by Intrauterine route Continuous., Disp: , Rfl:   •  nitroglycerin (NITROSTAT) 0.4 MG SL tablet, Place 1 tablet under the tongue Every 5 (Five) Minutes As Needed for Chest Pain. Take no more than 3 doses in 15 minutes., Disp: 25 tablet, Rfl: 12  •  olmesartan (BENICAR) 5 MG tablet, Take 2 tablets by mouth Daily., Disp: 60 tablet, Rfl: 3  •  olmesartan (BENICAR) 5 MG tablet, Take 2 tablets by mouth daily, Disp: 60 tablet, Rfl: 3  •  olmesartan (BENICAR) 5 MG tablet, Take 2 tablets by mouth daily, Disp: 60 tablet, Rfl: 3  •  olmesartan (BENICAR) 5 MG tablet, Take 2 tablets by mouth daily, Disp: 60 tablet, Rfl: 3  •  omeprazole (priLOSEC) 20 MG capsule, Take 1 capsule by mouth Daily., Disp: 30 capsule, Rfl: 0  •  rosuvastatin (CRESTOR) 40 MG tablet, Take 1 tablet by mouth nightly (Patient taking differently: Take 40 mg by mouth Daily. Takes 1/2 daily), Disp: 90 tablet, Rfl: 1  •  rosuvastatin (CRESTOR) 40 MG tablet, Take 1 tablet by mouth daily, Disp: 90 tablet, Rfl: 1  •  Semaglutide, 1 MG/DOSE, (Ozempic, 1 MG/DOSE,) 4 MG/3ML solution pen-injector, Inject 1 mg under the skin into the appropriate area as directed 1 (One) Time Per Week., Disp: 3 mL, Rfl: 2  •  traMADol (Ultram) 50 MG tablet, Take 1 tablet by mouth Every 8 (Eight) Hours As Needed for Moderate Pain ., Disp: 30 tablet, Rfl: 0    Allergies   Allergen Reactions   • Penicillins Hives     Social History     Tobacco Use   • Smoking status: Former     Packs/day: 0.50     Years: 8.00     Pack years: 4.00     Types: Cigarettes     Start date: 1999     Quit date: 3/15/2006     Years since quittin.9   • Smokeless tobacco: Never   Substance Use Topics   • Alcohol use: Not Currently     Review of Systems   Constitutional: Negative for fever and weight loss.   Cardiovascular: Negative for chest pain, dyspnea on exertion, leg swelling, orthopnea, palpitations,  "paroxysmal nocturnal dyspnea and syncope.   Respiratory: Negative for cough, shortness of breath and wheezing.    Gastrointestinal: Negative for abdominal pain, nausea and vomiting.   Neurological: Negative for dizziness, headaches and loss of balance.       ECG 12 Lead    Date/Time: 2/8/2023 2:26 PM  Performed by: Guille Nixon MD  Authorized by: Guille Nixon MD   Comparison: compared with previous ECG from 2/7/2022  Similar to previous ECG  Rhythm: sinus rhythm  Rate: normal  Conduction: conduction normal  QRS axis: normal  Other findings: non-specific ST-T wave changes and poor R wave progression    Clinical impression: abnormal EKG               Objective:     Vitals reviewed.   Constitutional:       General: Not in acute distress.     Appearance: Well-developed and not in distress.   Eyes:      Extraocular Movements: Extraocular movements intact.   HENT:      Head: Normocephalic and atraumatic.   Pulmonary:      Effort: Pulmonary effort is normal.      Breath sounds: Normal breath sounds. No wheezing. No rhonchi. No rales.   Cardiovascular:      Normal rate. Regular rhythm.      Murmurs: There is no murmur.      No gallop.   Pulses:     Intact distal pulses.   Edema:     Peripheral edema absent.   Abdominal:      General: Bowel sounds are normal. There is no distension.      Palpations: Abdomen is soft.      Tenderness: There is no abdominal tenderness.   Skin:     General: Skin is warm and dry. There is no cyanosis.      Findings: No erythema or rash.   Neurological:      Mental Status: Alert and oriented to person, place, and time.      Cranial Nerves: No cranial nerve deficit.       /80   Pulse 97   Ht 161.9 cm (63.75\")   Wt 96.2 kg (212 lb)   SpO2 100%   BMI 36.68 kg/m²     Data/Lab Review:     Lab Results   Component Value Date    CHOL 160 11/09/2022    CHLPL 157 05/17/2016    TRIG 156 (H) 11/09/2022    HDL 51 11/09/2022    LDL 82 11/09/2022     Lab Results   Component Value " Date    GLUCOSE 132 (H) 11/09/2022    BUN 10 11/09/2022    CREATININE 0.61 11/09/2022    EGFR 115.4 11/09/2022    BCR 16.4 11/09/2022    K 4.5 11/09/2022    CO2 29.0 11/09/2022    CALCIUM 9.6 11/09/2022    ALBUMIN 4.80 11/09/2022    AST 25 11/09/2022    ALT 27 11/09/2022     Results for orders placed during the hospital encounter of 09/03/20    Adult Transthoracic Echo Complete W/ Cont if Necessary Per Protocol    Interpretation Summary  · Left ventricular systolic function is low normal. Estimated EF appears to be in the range of 51 - 55%.  · The following left ventricular wall segments are hypokinetic: apical septal, basal inferoseptal, mid inferoseptal, mid anteroseptal and basal inferoseptal.  · Left ventricular wall thickness is consistent with mild concentric hypertrophy.  · No hemodynamically significant valvular abnormalities identified on this study.        Assessment:          Diagnosis Plan   1. Coronary artery disease involving native coronary artery of native heart without angina pectoris  ECG 12 Lead      2. Primary hypertension        3. Mixed hyperlipidemia             Plan:       1.  Coronary artery disease: The patient remains clinically stable and does not describe any ischemic type symptoms.  She remains on aspirin 81 mg daily and should do so indefinitely.  She is also on high intensity statin therapy and tolerating this well.  Changes recommended at this time.     2.  Essential hypertension: Blood pressure remains well controlled.  Continue Benicar.     3.  Mixed hyperlipidemia: The patient's most recent LDL cholesterol is not at goal of less than 70 although previous assessments did show her to be at goal in the past.  We discussed lifestyle changes and to continue current dose of statin therapy.  If the patient fails to achieve a goal LDL less than 70 long-term, she could be considered for PCSK9 inhibitor therapy which we discussed today.     We will see the patient again in 12 months unless  otherwise needed sooner.

## 2023-04-04 ENCOUNTER — PATIENT MESSAGE (OUTPATIENT)
Dept: INTERNAL MEDICINE | Age: 43
End: 2023-04-04

## 2023-04-04 NOTE — TELEPHONE ENCOUNTER
From: Eliot Bell  To: Dr. Brewer Mode: 4/4/2023 9:44 AM CDT  Subject: Covid positive    Vasile and I have been sick all weekend and I had some at home covid tests that [facility] sent me, and I'm positive. Is there anything I need to be taking? My symptoms are runny nose, cough, horrible body aches. I tried to work today, but I'm just in too much pain. Just let me know if you are going to call something in for me so I can have someone pick it up. Thank you.

## 2023-04-04 NOTE — TELEPHONE ENCOUNTER
Spoke to pt advised to do flonase mucinex otc cough meds and tylenol or ibuprofen if no better let me know and we can do a video visit to get something called in

## 2023-05-08 RX ORDER — OLMESARTAN MEDOXOMIL 5 MG/1
10 TABLET ORAL DAILY
Qty: 60 TABLET | Refills: 0 | Status: SHIPPED | OUTPATIENT
Start: 2023-05-08 | End: 2023-06-07 | Stop reason: SDUPTHER

## 2023-05-08 RX ORDER — SEMAGLUTIDE 1.34 MG/ML
1 INJECTION, SOLUTION SUBCUTANEOUS WEEKLY
Qty: 3 ML | Refills: 0 | Status: SHIPPED | OUTPATIENT
Start: 2023-05-08 | End: 2023-06-07 | Stop reason: SDUPTHER

## 2023-06-02 ENCOUNTER — LAB (OUTPATIENT)
Dept: LAB | Facility: HOSPITAL | Age: 43
End: 2023-06-02
Payer: COMMERCIAL

## 2023-06-02 ENCOUNTER — TRANSCRIBE ORDERS (OUTPATIENT)
Dept: ADMINISTRATIVE | Facility: HOSPITAL | Age: 43
End: 2023-06-02
Payer: COMMERCIAL

## 2023-06-02 DIAGNOSIS — R79.89 ELEVATED FERRITIN: ICD-10-CM

## 2023-06-02 DIAGNOSIS — I10 ESSENTIAL HYPERTENSION: ICD-10-CM

## 2023-06-02 DIAGNOSIS — Z00.00 ANNUAL PHYSICAL EXAM: ICD-10-CM

## 2023-06-02 DIAGNOSIS — E11.9 TYPE 2 DIABETES MELLITUS WITHOUT COMPLICATION, UNSPECIFIED WHETHER LONG TERM INSULIN USE: ICD-10-CM

## 2023-06-02 DIAGNOSIS — I25.119 CORONARY ARTERY DISEASE INVOLVING NATIVE CORONARY ARTERY OF NATIVE HEART WITH ANGINA PECTORIS: ICD-10-CM

## 2023-06-02 DIAGNOSIS — E55.9 VITAMIN D DEFICIENCY: ICD-10-CM

## 2023-06-02 DIAGNOSIS — Z11.59 NEED FOR HEPATITIS C SCREENING TEST: ICD-10-CM

## 2023-06-02 DIAGNOSIS — Z90.3 STATUS POST SLEEVE GASTRECTOMY: ICD-10-CM

## 2023-06-02 DIAGNOSIS — Z11.59 NEED FOR HEPATITIS C SCREENING TEST: Primary | ICD-10-CM

## 2023-06-02 LAB
25(OH)D3 SERPL-MCNC: 22.9 NG/ML (ref 30–100)
ALBUMIN SERPL-MCNC: 4.5 G/DL
ALBUMIN SERPL-MCNC: 4.5 G/DL (ref 3.5–5)
ALBUMIN UR-MCNC: 2 MG/DL
ALBUMIN/GLOB SERPL: 1.5 G/DL (ref 1.1–2.5)
ALP BLD-CCNC: 53 U/L
ALP SERPL-CCNC: 53 U/L (ref 24–120)
ALT SERPL W P-5'-P-CCNC: 25 U/L (ref 0–35)
ALT SERPL-CCNC: 25 U/L
ANION GAP SERPL CALCULATED.3IONS-SCNC: 7 MMOL/L
ANION GAP SERPL CALCULATED.3IONS-SCNC: 7 MMOL/L (ref 4–13)
AST SERPL-CCNC: 26 U/L
AST SERPL-CCNC: 26 U/L (ref 7–45)
AUTO MIXED CELLS #: 0.4 10*3/MM3 (ref 0.1–2.6)
AUTO MIXED CELLS %: 6.6 % (ref 0.1–24)
AVERAGE GLUCOSE: NORMAL
BILIRUB SERPL-MCNC: 1 MG/DL (ref 0.1–1)
BILIRUB SERPL-MCNC: 1 MG/DL (ref 0.1–1.4)
BILIRUB UR QL STRIP: NEGATIVE
BUN BLDV-MCNC: 9 MG/DL
BUN SERPL-MCNC: 9 MG/DL (ref 5–21)
BUN/CREAT SERPL: 15
CALCIUM SERPL-MCNC: 9 MG/DL
CALCIUM SPEC-SCNC: 9 MG/DL (ref 8.4–10.4)
CHLORIDE BLD-SCNC: 106 MMOL/L
CHLORIDE SERPL-SCNC: 106 MMOL/L (ref 98–110)
CHOLEST SERPL-MCNC: 166 MG/DL (ref 130–200)
CHOLESTEROL, TOTAL: 166 MG/DL
CHOLESTEROL/HDL RATIO: 1.32
CLARITY UR: CLEAR
CO2 SERPL-SCNC: 27 MMOL/L (ref 24–31)
CO2: 27 MMOL/L
COLOR UR: YELLOW
CREAT SERPL-MCNC: 0.6 MG/DL
CREAT SERPL-MCNC: 0.6 MG/DL (ref 0.5–1.4)
CREAT UR-MCNC: 189.4 MG/DL
CREATININE, URINE: 189.4
EGFR: 115.1
EGFRCR SERPLBLD CKD-EPI 2021: 115.1 ML/MIN/1.73
ERYTHROCYTE [DISTWIDTH] IN BLOOD BY AUTOMATED COUNT: 12.2 % (ref 12.3–15.4)
FERRITIN SERPL-MCNC: 219 NG/ML (ref 13–150)
GLOBULIN UR ELPH-MCNC: 3.1 GM/DL
GLUCOSE BLD-MCNC: 132 MG/DL
GLUCOSE SERPL-MCNC: 132 MG/DL (ref 70–100)
GLUCOSE UR STRIP-MCNC: NEGATIVE MG/DL
HBA1C MFR BLD: 7 %
HBA1C MFR BLD: 7 % (ref 4.8–5.9)
HCT VFR BLD AUTO: 39.3 % (ref 34–46.6)
HCV AB SER DONR QL: NORMAL
HDLC SERPL-MCNC: 58 MG/DL
HDLC SERPL-MCNC: 58 MG/DL (ref 35–70)
HGB BLD-MCNC: 13.6 G/DL (ref 12–15.9)
HGB UR QL STRIP.AUTO: NEGATIVE
KETONES UR QL STRIP: NEGATIVE
LDL CHOLESTEROL CALCULATED: 81 MG/DL (ref 0–160)
LDLC SERPL CALC-MCNC: 81 MG/DL (ref 0–99)
LDLC/HDLC SERPL: 1.32 {RATIO}
LEUKOCYTE ESTERASE UR QL STRIP.AUTO: NEGATIVE
LYMPHOCYTES # BLD AUTO: 2.6 10*3/MM3 (ref 0.7–3.1)
LYMPHOCYTES NFR BLD AUTO: 38.8 % (ref 19.6–45.3)
MCH RBC QN AUTO: 30 PG (ref 26.6–33)
MCHC RBC AUTO-ENTMCNC: 34.6 G/DL (ref 31.5–35.7)
MCV RBC AUTO: 86.6 FL (ref 79–97)
MICROALBUMIN/CREAT 24H UR: 2 MG/G{CREAT}
MICROALBUMIN/CREAT UR-RTO: 10.6
MICROALBUMIN/CREAT UR: 10.6 MG/G
NEUTROPHILS NFR BLD AUTO: 3.8 10*3/MM3 (ref 1.7–7)
NEUTROPHILS NFR BLD AUTO: 54.6 % (ref 42.7–76)
NITRITE UR QL STRIP: NEGATIVE
NONHDLC SERPL-MCNC: NORMAL MG/DL
PH UR STRIP.AUTO: 6.5 [PH] (ref 5–8)
PLATELET # BLD AUTO: 228 10*3/MM3 (ref 140–450)
PMV BLD AUTO: 10.4 FL (ref 6–12)
POTASSIUM SERPL-SCNC: 4.4 MMOL/L
POTASSIUM SERPL-SCNC: 4.4 MMOL/L (ref 3.5–5.3)
PROT SERPL-MCNC: 7.6 G/DL (ref 6.3–8.7)
PROT UR QL STRIP: NEGATIVE
RBC # BLD AUTO: 4.54 10*6/MM3 (ref 3.77–5.28)
SODIUM BLD-SCNC: 140 MMOL/L
SODIUM SERPL-SCNC: 140 MMOL/L (ref 135–145)
SP GR UR STRIP: 1.01 (ref 1–1.03)
TOTAL PROTEIN: 7.6
TRIGL SERPL-MCNC: 156 MG/DL
TRIGL SERPL-MCNC: 156 MG/DL (ref 0–149)
TSH SERPL DL<=0.05 MIU/L-ACNC: 1.19 UIU/ML (ref 0.27–4.2)
UROBILINOGEN UR QL STRIP: NORMAL
VIT B12 BLD-MCNC: 327 PG/ML (ref 211–946)
VLDLC SERPL CALC-MCNC: 27 MG/DL
VLDLC SERPL-MCNC: 27 MG/DL (ref 5–40)
WBC NRBC COR # BLD: 6.8 10*3/MM3 (ref 3.4–10.8)

## 2023-06-02 PROCEDURE — 82728 ASSAY OF FERRITIN: CPT

## 2023-06-02 PROCEDURE — 86803 HEPATITIS C AB TEST: CPT

## 2023-06-02 PROCEDURE — 82570 ASSAY OF URINE CREATININE: CPT

## 2023-06-02 PROCEDURE — 80050 GENERAL HEALTH PANEL: CPT

## 2023-06-02 PROCEDURE — 82306 VITAMIN D 25 HYDROXY: CPT

## 2023-06-02 PROCEDURE — 82043 UR ALBUMIN QUANTITATIVE: CPT

## 2023-06-02 PROCEDURE — 82607 VITAMIN B-12: CPT

## 2023-06-02 PROCEDURE — 81003 URINALYSIS AUTO W/O SCOPE: CPT

## 2023-06-02 PROCEDURE — 83036 HEMOGLOBIN GLYCOSYLATED A1C: CPT | Performed by: INTERNAL MEDICINE

## 2023-06-02 PROCEDURE — 80061 LIPID PANEL: CPT

## 2023-06-02 PROCEDURE — 36415 COLL VENOUS BLD VENIPUNCTURE: CPT | Performed by: INTERNAL MEDICINE

## 2023-06-04 ASSESSMENT — PATIENT HEALTH QUESTIONNAIRE - PHQ9
SUM OF ALL RESPONSES TO PHQ9 QUESTIONS 1 & 2: 0
SUM OF ALL RESPONSES TO PHQ QUESTIONS 1-9: 0
2. FEELING DOWN, DEPRESSED OR HOPELESS: 0
SUM OF ALL RESPONSES TO PHQ QUESTIONS 1-9: 0
1. LITTLE INTEREST OR PLEASURE IN DOING THINGS: 0
1. LITTLE INTEREST OR PLEASURE IN DOING THINGS: NOT AT ALL
2. FEELING DOWN, DEPRESSED OR HOPELESS: NOT AT ALL
SUM OF ALL RESPONSES TO PHQ9 QUESTIONS 1 & 2: 0

## 2023-06-07 ENCOUNTER — OFFICE VISIT (OUTPATIENT)
Dept: INTERNAL MEDICINE | Age: 43
End: 2023-06-07
Payer: COMMERCIAL

## 2023-06-07 VITALS
SYSTOLIC BLOOD PRESSURE: 118 MMHG | OXYGEN SATURATION: 97 % | DIASTOLIC BLOOD PRESSURE: 86 MMHG | HEART RATE: 82 BPM | HEIGHT: 63 IN | WEIGHT: 215 LBS | RESPIRATION RATE: 18 BRPM | BODY MASS INDEX: 38.09 KG/M2

## 2023-06-07 DIAGNOSIS — E55.9 VITAMIN D DEFICIENCY: ICD-10-CM

## 2023-06-07 DIAGNOSIS — E78.2 MIXED HYPERLIPIDEMIA: ICD-10-CM

## 2023-06-07 DIAGNOSIS — E11.9 TYPE 2 DIABETES MELLITUS WITHOUT COMPLICATION, WITHOUT LONG-TERM CURRENT USE OF INSULIN (HCC): ICD-10-CM

## 2023-06-07 DIAGNOSIS — K76.0 FATTY LIVER: ICD-10-CM

## 2023-06-07 DIAGNOSIS — I10 ESSENTIAL HYPERTENSION: ICD-10-CM

## 2023-06-07 DIAGNOSIS — Z12.31 ENCOUNTER FOR SCREENING MAMMOGRAM FOR BREAST CANCER: Primary | ICD-10-CM

## 2023-06-07 DIAGNOSIS — Z90.3 STATUS POST SLEEVE GASTRECTOMY: ICD-10-CM

## 2023-06-07 DIAGNOSIS — R79.89 ELEVATED FERRITIN: ICD-10-CM

## 2023-06-07 DIAGNOSIS — I25.10 CORONARY ARTERY DISEASE INVOLVING NATIVE CORONARY ARTERY OF NATIVE HEART WITHOUT ANGINA PECTORIS: ICD-10-CM

## 2023-06-07 DIAGNOSIS — E53.8 B12 DEFICIENCY: ICD-10-CM

## 2023-06-07 PROCEDURE — 99396 PREV VISIT EST AGE 40-64: CPT | Performed by: INTERNAL MEDICINE

## 2023-06-07 PROCEDURE — 3074F SYST BP LT 130 MM HG: CPT | Performed by: INTERNAL MEDICINE

## 2023-06-07 PROCEDURE — 90677 PCV20 VACCINE IM: CPT | Performed by: INTERNAL MEDICINE

## 2023-06-07 PROCEDURE — 90471 IMMUNIZATION ADMIN: CPT | Performed by: INTERNAL MEDICINE

## 2023-06-07 PROCEDURE — 3079F DIAST BP 80-89 MM HG: CPT | Performed by: INTERNAL MEDICINE

## 2023-06-07 PROCEDURE — 96372 THER/PROPH/DIAG INJ SC/IM: CPT | Performed by: INTERNAL MEDICINE

## 2023-06-07 RX ORDER — OLMESARTAN MEDOXOMIL 5 MG/1
10 TABLET ORAL DAILY
Qty: 180 TABLET | Refills: 1 | Status: SHIPPED | OUTPATIENT
Start: 2023-06-07

## 2023-06-07 RX ORDER — ESCITALOPRAM OXALATE 10 MG/1
15 TABLET ORAL DAILY
Qty: 135 TABLET | Refills: 1 | Status: SHIPPED | OUTPATIENT
Start: 2023-06-07

## 2023-06-07 RX ORDER — ROSUVASTATIN CALCIUM 40 MG/1
40 TABLET, COATED ORAL DAILY
Qty: 90 TABLET | Refills: 1 | Status: SHIPPED | OUTPATIENT
Start: 2023-06-07

## 2023-06-07 RX ORDER — CYANOCOBALAMIN 1000 UG/ML
1000 INJECTION, SOLUTION INTRAMUSCULAR; SUBCUTANEOUS ONCE
Status: COMPLETED | OUTPATIENT
Start: 2023-06-07 | End: 2023-06-07

## 2023-06-07 RX ORDER — SEMAGLUTIDE 1.34 MG/ML
1 INJECTION, SOLUTION SUBCUTANEOUS WEEKLY
Qty: 9 ML | Refills: 1 | Status: SHIPPED | OUTPATIENT
Start: 2023-06-07

## 2023-06-07 RX ORDER — CYANOCOBALAMIN 1000 UG/ML
1000 INJECTION, SOLUTION INTRAMUSCULAR; SUBCUTANEOUS
Qty: 1 ML | Refills: 5 | Status: SHIPPED | OUTPATIENT
Start: 2023-06-07

## 2023-06-07 RX ADMIN — CYANOCOBALAMIN 1000 MCG: 1000 INJECTION, SOLUTION INTRAMUSCULAR; SUBCUTANEOUS at 15:33

## 2023-06-07 ASSESSMENT — ENCOUNTER SYMPTOMS
WHEEZING: 0
COUGH: 0
SORE THROAT: 0
CHEST TIGHTNESS: 0
CONSTIPATION: 0
ABDOMINAL PAIN: 0

## 2023-06-07 NOTE — PROGRESS NOTES
for fatigue. Negative for chills and fever. HENT:  Negative for congestion, ear pain, nosebleeds, postnasal drip and sore throat. Respiratory:  Negative for cough, chest tightness and wheezing. Cardiovascular:  Negative for chest pain, palpitations and leg swelling. Gastrointestinal:  Negative for abdominal pain and constipation. Genitourinary:  Negative for dysuria and urgency. Musculoskeletal: Negative. Negative for arthralgias. Skin:  Negative for rash. Neurological:  Negative for dizziness and headaches. Psychiatric/Behavioral: Negative. Vitals:    06/07/23 1453   BP: 118/86   Site: Left Upper Arm   Position: Sitting   Cuff Size: Large Adult   Pulse: 82   Resp: 18   SpO2: 97%   Weight: 215 lb (97.5 kg)   Height: 5' 3\" (1.6 m)      Wt Readings from Last 3 Encounters:   06/07/23 215 lb (97.5 kg)   11/14/22 212 lb (96.2 kg)   05/11/22 212 lb (96.2 kg)   Body mass index is 38.09 kg/m². BP Readings from Last 3 Encounters:   06/07/23 118/86   11/14/22 118/72   05/11/22 112/88       Physical Exam  Constitutional:       Appearance: She is well-developed. She is obese. HENT:      Right Ear: External ear normal.      Left Ear: External ear normal.      Mouth/Throat:      Pharynx: No oropharyngeal exudate. Eyes:      Conjunctiva/sclera: Conjunctivae normal.      Pupils: Pupils are equal, round, and reactive to light. Neck:      Thyroid: No thyromegaly. Vascular: No JVD. Cardiovascular:      Rate and Rhythm: Normal rate. Heart sounds: Normal heart sounds. No murmur heard. Pulmonary:      Effort: No respiratory distress. Breath sounds: Normal breath sounds. No wheezing or rales. Chest:      Chest wall: No tenderness. Abdominal:      General: Bowel sounds are normal.      Palpations: Abdomen is soft. Musculoskeletal:      Cervical back: Neck supple. Lymphadenopathy:      Cervical: No cervical adenopathy. Skin:     Findings: No rash.    Neurological:      Mental

## 2023-06-08 ENCOUNTER — TRANSCRIBE ORDERS (OUTPATIENT)
Dept: ADMINISTRATIVE | Facility: HOSPITAL | Age: 43
End: 2023-06-08
Payer: COMMERCIAL

## 2023-06-08 DIAGNOSIS — Z12.31 VISIT FOR SCREENING MAMMOGRAM: Primary | ICD-10-CM

## 2023-06-20 DIAGNOSIS — Z12.31 ENCOUNTER FOR SCREENING MAMMOGRAM FOR BREAST CANCER: ICD-10-CM

## 2023-11-09 NOTE — TELEPHONE ENCOUNTER
: [phone]           Patient doing: good        PROCEDURE INFORMATION:   Date of Procedure: 12/08/2021  Follow up appointment: 12/14/2021      1. Are you tolerating liquids? yes  Reason:      2. How many ounces of liquid are you getting per day? 32oz     3. Are you ambulating well? yes     4. Do you have nausea or vomiting? Had luis nausea early on but it has resolved.     5. How do your incisions look? good                   6. Do you have any concerns?  not at this time.     
[facility]  - [facility]

## 2023-12-05 DIAGNOSIS — E53.8 B12 DEFICIENCY: ICD-10-CM

## 2023-12-05 RX ORDER — ESCITALOPRAM OXALATE 10 MG/1
15 TABLET ORAL DAILY
Qty: 135 TABLET | Refills: 0 | Status: SHIPPED | OUTPATIENT
Start: 2023-12-05

## 2023-12-05 RX ORDER — SEMAGLUTIDE 1.34 MG/ML
INJECTION, SOLUTION SUBCUTANEOUS
Qty: 9 ML | Refills: 0 | Status: SHIPPED | OUTPATIENT
Start: 2023-12-05

## 2023-12-05 RX ORDER — CYANOCOBALAMIN 1000 UG/ML
INJECTION, SOLUTION INTRAMUSCULAR; SUBCUTANEOUS
Qty: 1 ML | Refills: 1 | Status: SHIPPED | OUTPATIENT
Start: 2023-12-05

## 2023-12-05 NOTE — TELEPHONE ENCOUNTER
Vinay Velasquez called to request a refill on her medication. Last office visit : 6/7/2023   Next office visit : 1/11/2024     Requested Prescriptions     Pending Prescriptions Disp Refills    escitalopram (LEXAPRO) 10 MG tablet [Pharmacy Med Name: Escitalopram Oxalate 10 MG Oral Tablet (LEXAPRO)] 135 tablet 1     Sig: Take 1.5 tablets by mouth Daily. OZEMPIC, 1 MG/DOSE, 4 MG/3ML SOPN [Pharmacy Med Name: Ozempic (1 MG/DOSE) 4 MG/3ML Subcutaneous Solution Pen-injector (Semaglutide (1 MG/DOSE))] 9 mL 1     Sig: Inject 1 mg under the skin into the appropriate area as directed Every 7 (Seven) Days. cyanocobalamin 1000 MCG/ML injection [Pharmacy Med Name: Cyanocobalamin 1000 MCG/ML Injection Solution] 1 mL 5     Sig: Inject 1 mL into the appropriate muscle as directed by prescriber Every 30 (Thirty) Days.             Sonay Dobson MA

## 2024-01-05 ENCOUNTER — TRANSCRIBE ORDERS (OUTPATIENT)
Dept: ADMINISTRATIVE | Facility: HOSPITAL | Age: 44
End: 2024-01-05
Payer: COMMERCIAL

## 2024-01-05 ENCOUNTER — LAB (OUTPATIENT)
Dept: LAB | Facility: HOSPITAL | Age: 44
End: 2024-01-05
Payer: COMMERCIAL

## 2024-01-05 DIAGNOSIS — I25.119 CORONARY ARTERY DISEASE INVOLVING NATIVE CORONARY ARTERY OF NATIVE HEART WITH ANGINA PECTORIS: ICD-10-CM

## 2024-01-05 DIAGNOSIS — R79.89 ELEVATED FERRITIN: ICD-10-CM

## 2024-01-05 DIAGNOSIS — Z90.3 STATUS POST GASTRECTOMY: ICD-10-CM

## 2024-01-05 DIAGNOSIS — E11.9 TYPE 2 DIABETES MELLITUS WITHOUT COMPLICATION, UNSPECIFIED WHETHER LONG TERM INSULIN USE: ICD-10-CM

## 2024-01-05 DIAGNOSIS — Z12.31 ENCOUNTER FOR SCREENING MAMMOGRAM FOR BREAST CANCER: ICD-10-CM

## 2024-01-05 DIAGNOSIS — E78.2 MIXED HYPERLIPIDEMIA: ICD-10-CM

## 2024-01-05 DIAGNOSIS — K76.0 FATTY LIVER: ICD-10-CM

## 2024-01-05 DIAGNOSIS — I10 ESSENTIAL HYPERTENSION: ICD-10-CM

## 2024-01-05 DIAGNOSIS — E55.9 VITAMIN D DEFICIENCY: ICD-10-CM

## 2024-01-05 DIAGNOSIS — E53.8 B12 DEFICIENCY: ICD-10-CM

## 2024-01-05 DIAGNOSIS — Z12.31 ENCOUNTER FOR SCREENING MAMMOGRAM FOR BREAST CANCER: Primary | ICD-10-CM

## 2024-01-05 LAB
25(OH)D3 SERPL-MCNC: 23 NG/ML (ref 30–100)
ALBUMIN SERPL-MCNC: 4.7 G/DL (ref 3.5–5)
ALBUMIN/GLOB SERPL: 1.4 G/DL (ref 1.1–2.5)
ALP SERPL-CCNC: 66 U/L (ref 24–120)
ALT SERPL W P-5'-P-CCNC: 38 U/L (ref 0–35)
ANION GAP SERPL CALCULATED.3IONS-SCNC: 8 MMOL/L (ref 4–13)
AST SERPL-CCNC: 35 U/L (ref 7–45)
AUTO MIXED CELLS #: 0.5 10*3/MM3 (ref 0.1–2.6)
AUTO MIXED CELLS %: 6 % (ref 0.1–24)
BILIRUB SERPL-MCNC: 1.1 MG/DL (ref 0.1–1)
BUN SERPL-MCNC: 7 MG/DL (ref 5–21)
BUN/CREAT SERPL: 12.3
CALCIUM SPEC-SCNC: 10 MG/DL (ref 8.4–10.4)
CHLORIDE SERPL-SCNC: 102 MMOL/L (ref 98–110)
CHOLEST SERPL-MCNC: 175 MG/DL (ref 130–200)
CO2 SERPL-SCNC: 28 MMOL/L (ref 24–31)
CREAT SERPL-MCNC: 0.57 MG/DL (ref 0.5–1.4)
EGFRCR SERPLBLD CKD-EPI 2021: 115.8 ML/MIN/1.73
ERYTHROCYTE [DISTWIDTH] IN BLOOD BY AUTOMATED COUNT: 11.8 % (ref 12.3–15.4)
GLOBULIN UR ELPH-MCNC: 3.4 GM/DL
GLUCOSE SERPL-MCNC: 147 MG/DL (ref 70–100)
HBA1C MFR BLD: 8.7 % (ref 4.8–5.9)
HCT VFR BLD AUTO: 41.6 % (ref 34–46.6)
HDLC SERPL-MCNC: 58 MG/DL
HGB BLD-MCNC: 13.8 G/DL (ref 12–15.9)
LDLC SERPL CALC-MCNC: 82 MG/DL (ref 0–99)
LDLC/HDLC SERPL: 1.28 {RATIO}
LYMPHOCYTES # BLD AUTO: 3.1 10*3/MM3 (ref 0.7–3.1)
LYMPHOCYTES NFR BLD AUTO: 40.8 % (ref 19.6–45.3)
MCH RBC QN AUTO: 28.9 PG (ref 26.6–33)
MCHC RBC AUTO-ENTMCNC: 33.2 G/DL (ref 31.5–35.7)
MCV RBC AUTO: 87 FL (ref 79–97)
NEUTROPHILS NFR BLD AUTO: 4 10*3/MM3 (ref 1.7–7)
NEUTROPHILS NFR BLD AUTO: 53.2 % (ref 42.7–76)
PLATELET # BLD AUTO: 249 10*3/MM3 (ref 140–450)
PMV BLD AUTO: 10.2 FL (ref 6–12)
POTASSIUM SERPL-SCNC: 4.4 MMOL/L (ref 3.5–5.3)
PROT SERPL-MCNC: 8.1 G/DL (ref 6.3–8.7)
RBC # BLD AUTO: 4.78 10*6/MM3 (ref 3.77–5.28)
SODIUM SERPL-SCNC: 138 MMOL/L (ref 135–145)
TRIGL SERPL-MCNC: 213 MG/DL (ref 0–149)
VIT B12 BLD-MCNC: 523 PG/ML (ref 211–946)
VLDLC SERPL-MCNC: 35 MG/DL (ref 5–40)
WBC NRBC COR # BLD AUTO: 7.6 10*3/MM3 (ref 3.4–10.8)

## 2024-01-05 PROCEDURE — 80053 COMPREHEN METABOLIC PANEL: CPT | Performed by: INTERNAL MEDICINE

## 2024-01-05 PROCEDURE — 82306 VITAMIN D 25 HYDROXY: CPT

## 2024-01-05 PROCEDURE — 82607 VITAMIN B-12: CPT

## 2024-01-05 PROCEDURE — 83036 HEMOGLOBIN GLYCOSYLATED A1C: CPT | Performed by: INTERNAL MEDICINE

## 2024-01-05 PROCEDURE — 36415 COLL VENOUS BLD VENIPUNCTURE: CPT

## 2024-01-05 PROCEDURE — 80061 LIPID PANEL: CPT

## 2024-01-05 PROCEDURE — 85025 COMPLETE CBC W/AUTO DIFF WBC: CPT

## 2024-01-08 SDOH — ECONOMIC STABILITY: INCOME INSECURITY: HOW HARD IS IT FOR YOU TO PAY FOR THE VERY BASICS LIKE FOOD, HOUSING, MEDICAL CARE, AND HEATING?: NOT VERY HARD

## 2024-01-08 SDOH — ECONOMIC STABILITY: TRANSPORTATION INSECURITY
IN THE PAST 12 MONTHS, HAS LACK OF TRANSPORTATION KEPT YOU FROM MEETINGS, WORK, OR FROM GETTING THINGS NEEDED FOR DAILY LIVING?: NO

## 2024-01-08 SDOH — ECONOMIC STABILITY: HOUSING INSECURITY
IN THE LAST 12 MONTHS, WAS THERE A TIME WHEN YOU DID NOT HAVE A STEADY PLACE TO SLEEP OR SLEPT IN A SHELTER (INCLUDING NOW)?: NO

## 2024-01-08 SDOH — ECONOMIC STABILITY: FOOD INSECURITY: WITHIN THE PAST 12 MONTHS, YOU WORRIED THAT YOUR FOOD WOULD RUN OUT BEFORE YOU GOT MONEY TO BUY MORE.: NEVER TRUE

## 2024-01-08 SDOH — ECONOMIC STABILITY: FOOD INSECURITY: WITHIN THE PAST 12 MONTHS, THE FOOD YOU BOUGHT JUST DIDN'T LAST AND YOU DIDN'T HAVE MONEY TO GET MORE.: NEVER TRUE

## 2024-01-11 ENCOUNTER — OFFICE VISIT (OUTPATIENT)
Dept: INTERNAL MEDICINE | Age: 44
End: 2024-01-11
Payer: COMMERCIAL

## 2024-01-11 VITALS
DIASTOLIC BLOOD PRESSURE: 80 MMHG | HEIGHT: 63 IN | WEIGHT: 220.8 LBS | HEART RATE: 91 BPM | BODY MASS INDEX: 39.12 KG/M2 | OXYGEN SATURATION: 98 % | SYSTOLIC BLOOD PRESSURE: 112 MMHG

## 2024-01-11 DIAGNOSIS — E78.2 MIXED HYPERLIPIDEMIA: ICD-10-CM

## 2024-01-11 DIAGNOSIS — E55.9 VITAMIN D DEFICIENCY: ICD-10-CM

## 2024-01-11 DIAGNOSIS — I10 ESSENTIAL HYPERTENSION: ICD-10-CM

## 2024-01-11 DIAGNOSIS — E11.9 TYPE 2 DIABETES MELLITUS WITHOUT COMPLICATION, WITHOUT LONG-TERM CURRENT USE OF INSULIN (HCC): Primary | ICD-10-CM

## 2024-01-11 DIAGNOSIS — E53.8 B12 DEFICIENCY: ICD-10-CM

## 2024-01-11 PROCEDURE — 3074F SYST BP LT 130 MM HG: CPT | Performed by: INTERNAL MEDICINE

## 2024-01-11 PROCEDURE — 3079F DIAST BP 80-89 MM HG: CPT | Performed by: INTERNAL MEDICINE

## 2024-01-11 PROCEDURE — 99214 OFFICE O/P EST MOD 30 MIN: CPT | Performed by: INTERNAL MEDICINE

## 2024-01-11 RX ORDER — ERGOCALCIFEROL 1.25 MG/1
50000 CAPSULE ORAL WEEKLY
Qty: 12 CAPSULE | Refills: 1 | Status: SHIPPED | OUTPATIENT
Start: 2024-01-11

## 2024-01-11 ASSESSMENT — ENCOUNTER SYMPTOMS
CONSTIPATION: 0
WHEEZING: 0
ABDOMINAL PAIN: 0
COUGH: 0
CHEST TIGHTNESS: 0
SORE THROAT: 0

## 2024-01-11 ASSESSMENT — PATIENT HEALTH QUESTIONNAIRE - PHQ9
SUM OF ALL RESPONSES TO PHQ QUESTIONS 1-9: 0
SUM OF ALL RESPONSES TO PHQ QUESTIONS 1-9: 0
2. FEELING DOWN, DEPRESSED OR HOPELESS: 0
SUM OF ALL RESPONSES TO PHQ QUESTIONS 1-9: 0
1. LITTLE INTEREST OR PLEASURE IN DOING THINGS: 0
SUM OF ALL RESPONSES TO PHQ QUESTIONS 1-9: 0
SUM OF ALL RESPONSES TO PHQ9 QUESTIONS 1 & 2: 0

## 2024-01-11 NOTE — PROGRESS NOTES
Chief Complaint   Patient presents with    6 Month Follow-Up     History of presenting illness:  Cameron Gambino is a43 y.o. female who presents today for follow up on her chronic medical conditions as noted below.    Patient Active Problem List    Diagnosis Date Noted    Coronary artery disease involving native coronary artery without angina pectoris 10/22/2020     Overview Note:     History of acute anterior wall MI 9/3/20  Status post insertion of drug-eluting stent into left anterior descending (LAD) artery      Medication intolerance 01/17/2019     Overview Note:     Intolerance to metformin      Generalized anxiety disorder 09/19/2017    Essential hypertension 09/18/2017    Type 2 diabetes mellitus without complication, without long-term current use of insulin (HCC) 09/18/2017    Fatty liver 09/18/2017    Morbid obesity due to excess calories (HCC) 09/18/2017    Mixed hyperlipidemia 09/18/2017    Chronic midline low back pain without sciatica 09/18/2017     No past medical history on file.   Past Surgical History:   Procedure Laterality Date    CHOLECYSTECTOMY       Current Outpatient Medications   Medication Sig Dispense Refill    vitamin D (ERGOCALCIFEROL) 1.25 MG (69664 UT) CAPS capsule Take 1 capsule by mouth once a week 12 capsule 1    sertraline (ZOLOFT) 50 MG tablet Take 1 tablet by mouth daily 90 tablet 1    Tirzepatide (MOUNJARO) 15 MG/0.5ML SOPN SC injection Inject 0.5 mLs into the skin once a week 2 mL 2    SITagliptin (JANUVIA) 100 MG tablet Take 1 tablet by mouth daily 90 tablet 1    escitalopram (LEXAPRO) 10 MG tablet Take 1.5 tablets by mouth Daily. 135 tablet 0    cyanocobalamin 1000 MCG/ML injection Inject 1 mL into the appropriate muscle as directed by prescriber Every 30 (Thirty) Days. 1 mL 1    rosuvastatin (CRESTOR) 40 MG tablet Take 1 tablet by mouth daily 90 tablet 1    olmesartan (BENICAR) 5 MG tablet Take 2 tablets by mouth daily 180 tablet 1    Needles & Syringes MISC 1 each by Does

## 2024-01-15 RX ORDER — OLMESARTAN MEDOXOMIL 5 MG/1
10 TABLET ORAL DAILY
Qty: 180 TABLET | Refills: 0 | Status: SHIPPED | OUTPATIENT
Start: 2024-01-15

## 2024-01-15 NOTE — TELEPHONE ENCOUNTER
Cameron Gambino called to request a refill on her medication.      Last office visit : 1/11/2024   Next office visit : 4/9/2024     Requested Prescriptions     Pending Prescriptions Disp Refills    olmesartan (BENICAR) 5 MG tablet [Pharmacy Med Name: Olmesartan Medoxomil 5 MG Oral Tablet (BENICAR)] 180 tablet 1     Sig: Take 2 tablets by mouth Daily.            Pati Vogt MA

## 2024-03-03 DIAGNOSIS — E53.8 B12 DEFICIENCY: ICD-10-CM

## 2024-03-04 RX ORDER — CYANOCOBALAMIN 1000 UG/ML
INJECTION, SOLUTION INTRAMUSCULAR; SUBCUTANEOUS
Qty: 1 ML | Refills: 1 | Status: SHIPPED | OUTPATIENT
Start: 2024-03-04

## 2024-03-04 NOTE — TELEPHONE ENCOUNTER
Cameron Gambino called to request a refill on her medication.      Last office visit : 1/11/2024   Next office visit : 4/9/2024     Requested Prescriptions     Pending Prescriptions Disp Refills    cyanocobalamin 1000 MCG/ML injection [Pharmacy Med Name: Cyanocobalamin 1000 MCG/ML Injection Solution] 1 mL 1     Sig: Inject 1 mL into the appropriate muscle as directed by prescriber Every 30 (Thirty) Days.            Pati Vogt MA

## 2024-03-26 ENCOUNTER — PATIENT MESSAGE (OUTPATIENT)
Dept: INTERNAL MEDICINE | Age: 44
End: 2024-03-26

## 2024-03-26 DIAGNOSIS — E11.9 TYPE 2 DIABETES MELLITUS WITHOUT COMPLICATION, WITHOUT LONG-TERM CURRENT USE OF INSULIN (HCC): Primary | ICD-10-CM

## 2024-03-26 NOTE — TELEPHONE ENCOUNTER
From: Cameron Gambino  To: Dr. Linda Jensen  Sent: 3/26/2024 10:27 AM CDT  Subject: Mounjaro    I still haven't gotten my prescription for mounjaro and have been without it or ozempic for over 3 weeks. I'm still on the waiting list for the mounjaro. Do you think we should just go back to ozempic but increase the dosage? And I think I would like to be on the medication for a few weeks and then do my follow up with labs.

## 2024-04-08 RX ORDER — OLMESARTAN MEDOXOMIL 5 MG/1
10 TABLET ORAL DAILY
Qty: 180 TABLET | Refills: 0 | Status: SHIPPED | OUTPATIENT
Start: 2024-04-08

## 2024-04-08 RX ORDER — ROSUVASTATIN CALCIUM 40 MG/1
40 TABLET, COATED ORAL DAILY
Qty: 90 TABLET | Refills: 0 | Status: SHIPPED | OUTPATIENT
Start: 2024-04-08

## 2024-05-13 ENCOUNTER — TRANSCRIBE ORDERS (OUTPATIENT)
Dept: ADMINISTRATIVE | Facility: HOSPITAL | Age: 44
End: 2024-05-13
Payer: COMMERCIAL

## 2024-05-13 ENCOUNTER — LAB (OUTPATIENT)
Dept: LAB | Facility: HOSPITAL | Age: 44
End: 2024-05-13
Payer: COMMERCIAL

## 2024-05-13 DIAGNOSIS — E11.9 DIABETES MELLITUS WITHOUT COMPLICATION: Primary | ICD-10-CM

## 2024-05-13 DIAGNOSIS — E55.9 VITAMIN D INSUFFICIENCY: ICD-10-CM

## 2024-05-13 DIAGNOSIS — E11.9 DIABETES MELLITUS WITHOUT COMPLICATION: ICD-10-CM

## 2024-05-13 LAB
25(OH)D3 SERPL-MCNC: 50.4 NG/ML (ref 30–100)
ALBUMIN SERPL-MCNC: 4.5 G/DL (ref 3.5–5)
ALBUMIN/GLOB SERPL: 1.5 G/DL (ref 1.1–2.5)
ALP SERPL-CCNC: 56 U/L (ref 24–120)
ALT SERPL W P-5'-P-CCNC: 24 U/L (ref 0–35)
ANION GAP SERPL CALCULATED.3IONS-SCNC: 8 MMOL/L (ref 4–13)
AST SERPL-CCNC: 27 U/L (ref 7–45)
BILIRUB SERPL-MCNC: 1 MG/DL (ref 0.1–1)
BUN SERPL-MCNC: 10 MG/DL (ref 5–21)
BUN/CREAT SERPL: 15.9
CALCIUM SPEC-SCNC: 9.4 MG/DL (ref 8.6–10.5)
CHLORIDE SERPL-SCNC: 102 MMOL/L (ref 98–110)
CO2 SERPL-SCNC: 27 MMOL/L (ref 24–31)
CREAT SERPL-MCNC: 0.63 MG/DL (ref 0.5–1.4)
EGFRCR SERPLBLD CKD-EPI 2021: 113 ML/MIN/1.73
GLOBULIN UR ELPH-MCNC: 3.1 GM/DL
GLUCOSE SERPL-MCNC: 139 MG/DL (ref 70–100)
HBA1C MFR BLD: 7.7 % (ref 4.8–5.9)
POTASSIUM SERPL-SCNC: 4.4 MMOL/L (ref 3.5–5.3)
PROT SERPL-MCNC: 7.6 G/DL (ref 6.3–8.7)
SODIUM SERPL-SCNC: 137 MMOL/L (ref 135–145)

## 2024-05-13 PROCEDURE — 36415 COLL VENOUS BLD VENIPUNCTURE: CPT

## 2024-05-13 PROCEDURE — 83036 HEMOGLOBIN GLYCOSYLATED A1C: CPT

## 2024-05-13 PROCEDURE — 82306 VITAMIN D 25 HYDROXY: CPT

## 2024-05-13 PROCEDURE — 80053 COMPREHEN METABOLIC PANEL: CPT

## 2024-05-16 ENCOUNTER — OFFICE VISIT (OUTPATIENT)
Dept: INTERNAL MEDICINE | Age: 44
End: 2024-05-16
Payer: COMMERCIAL

## 2024-05-16 VITALS
BODY MASS INDEX: 37.7 KG/M2 | HEIGHT: 63 IN | DIASTOLIC BLOOD PRESSURE: 80 MMHG | WEIGHT: 212.8 LBS | SYSTOLIC BLOOD PRESSURE: 122 MMHG | OXYGEN SATURATION: 97 % | HEART RATE: 98 BPM

## 2024-05-16 DIAGNOSIS — I10 ESSENTIAL HYPERTENSION: ICD-10-CM

## 2024-05-16 DIAGNOSIS — E53.8 B12 DEFICIENCY: ICD-10-CM

## 2024-05-16 DIAGNOSIS — F41.1 GENERALIZED ANXIETY DISORDER: ICD-10-CM

## 2024-05-16 DIAGNOSIS — E78.2 MIXED HYPERLIPIDEMIA: ICD-10-CM

## 2024-05-16 DIAGNOSIS — E11.9 TYPE 2 DIABETES MELLITUS WITHOUT COMPLICATION, WITHOUT LONG-TERM CURRENT USE OF INSULIN (HCC): Primary | ICD-10-CM

## 2024-05-16 DIAGNOSIS — E55.9 VITAMIN D DEFICIENCY: ICD-10-CM

## 2024-05-16 DIAGNOSIS — K76.0 FATTY LIVER: ICD-10-CM

## 2024-05-16 PROCEDURE — 99214 OFFICE O/P EST MOD 30 MIN: CPT | Performed by: INTERNAL MEDICINE

## 2024-05-16 PROCEDURE — 3074F SYST BP LT 130 MM HG: CPT | Performed by: INTERNAL MEDICINE

## 2024-05-16 PROCEDURE — 96372 THER/PROPH/DIAG INJ SC/IM: CPT | Performed by: INTERNAL MEDICINE

## 2024-05-16 PROCEDURE — 3079F DIAST BP 80-89 MM HG: CPT | Performed by: INTERNAL MEDICINE

## 2024-05-16 RX ORDER — ESCITALOPRAM OXALATE 10 MG/1
10 TABLET ORAL DAILY
Qty: 30 TABLET | Refills: 2 | Status: SHIPPED | OUTPATIENT
Start: 2024-05-16

## 2024-05-16 RX ORDER — CYANOCOBALAMIN 1000 UG/ML
1000 INJECTION, SOLUTION INTRAMUSCULAR; SUBCUTANEOUS ONCE
Status: COMPLETED | OUTPATIENT
Start: 2024-05-16 | End: 2024-05-16

## 2024-05-16 RX ORDER — NITROGLYCERIN 0.4 MG/1
0.4 TABLET SUBLINGUAL EVERY 5 MIN PRN
Qty: 25 TABLET | Refills: 0 | Status: SHIPPED | OUTPATIENT
Start: 2024-05-16

## 2024-05-16 RX ORDER — ROSUVASTATIN CALCIUM 40 MG/1
40 TABLET, COATED ORAL DAILY
Qty: 90 TABLET | Refills: 1 | Status: SHIPPED | OUTPATIENT
Start: 2024-05-16

## 2024-05-16 RX ORDER — ERGOCALCIFEROL 1.25 MG/1
50000 CAPSULE ORAL WEEKLY
Qty: 12 CAPSULE | Refills: 1 | Status: SHIPPED | OUTPATIENT
Start: 2024-05-16

## 2024-05-16 RX ORDER — BUSPIRONE HYDROCHLORIDE 10 MG/1
TABLET ORAL
Qty: 180 TABLET | Refills: 1 | Status: SHIPPED | OUTPATIENT
Start: 2024-05-16

## 2024-05-16 RX ORDER — OLMESARTAN MEDOXOMIL 5 MG/1
10 TABLET ORAL DAILY
Qty: 180 TABLET | Refills: 1 | Status: SHIPPED | OUTPATIENT
Start: 2024-05-16

## 2024-05-16 RX ORDER — EZETIMIBE 10 MG/1
10 TABLET ORAL DAILY
Qty: 90 TABLET | Refills: 1 | Status: SHIPPED | OUTPATIENT
Start: 2024-05-16

## 2024-05-16 RX ADMIN — CYANOCOBALAMIN 1000 MCG: 1000 INJECTION, SOLUTION INTRAMUSCULAR; SUBCUTANEOUS at 09:20

## 2024-05-16 SDOH — ECONOMIC STABILITY: FOOD INSECURITY: WITHIN THE PAST 12 MONTHS, THE FOOD YOU BOUGHT JUST DIDN'T LAST AND YOU DIDN'T HAVE MONEY TO GET MORE.: NEVER TRUE

## 2024-05-16 SDOH — ECONOMIC STABILITY: FOOD INSECURITY: WITHIN THE PAST 12 MONTHS, YOU WORRIED THAT YOUR FOOD WOULD RUN OUT BEFORE YOU GOT MONEY TO BUY MORE.: NEVER TRUE

## 2024-05-16 SDOH — ECONOMIC STABILITY: INCOME INSECURITY: HOW HARD IS IT FOR YOU TO PAY FOR THE VERY BASICS LIKE FOOD, HOUSING, MEDICAL CARE, AND HEATING?: NOT HARD AT ALL

## 2024-05-16 ASSESSMENT — ENCOUNTER SYMPTOMS
WHEEZING: 0
COUGH: 0
ABDOMINAL PAIN: 0
CONSTIPATION: 0
SORE THROAT: 0
CHEST TIGHTNESS: 0

## 2024-05-16 NOTE — PROGRESS NOTES
Chief Complaint   Patient presents with    3 Month Follow-Up     History of presenting illness:  Cameron Gambino is a43 y.o. female who presents today for follow up on her chronic medical conditions as noted below.    Patient Active Problem List    Diagnosis Date Noted    Coronary artery disease involving native coronary artery without angina pectoris 10/22/2020     Overview Note:     History of acute anterior wall MI 9/3/20  Status post insertion of drug-eluting stent into left anterior descending (LAD) artery      Medication intolerance 01/17/2019     Overview Note:     Intolerance to metformin      Generalized anxiety disorder 09/19/2017    Essential hypertension 09/18/2017    Type 2 diabetes mellitus without complication, without long-term current use of insulin (HCC) 09/18/2017    Fatty liver 09/18/2017    Morbid obesity due to excess calories (HCC) 09/18/2017    Mixed hyperlipidemia 09/18/2017    Chronic midline low back pain without sciatica 09/18/2017     No past medical history on file.   Past Surgical History:   Procedure Laterality Date    CHOLECYSTECTOMY       Current Outpatient Medications   Medication Sig Dispense Refill    nitroGLYCERIN (NITROSTAT) 0.4 MG SL tablet Place 1 tablet under the tongue every 5 minutes as needed for Chest pain up to max of 3 total doses. If no relief after 1 dose, call 911. 25 tablet 0    semaglutide, 2 MG/DOSE, (OZEMPIC) 8 MG/3ML SOPN sc injection Inject 2 mg into the skin every 7 days 3 mL 1    busPIRone (BUSPAR) 10 MG tablet Take one every 12 hours as needed 180 tablet 1    olmesartan (BENICAR) 5 MG tablet Take 2 tablets by mouth daily 180 tablet 1    rosuvastatin (CRESTOR) 40 MG tablet Take 1 tablet by mouth daily 90 tablet 1    SITagliptin (JANUVIA) 100 MG tablet Take 1 tablet by mouth daily 90 tablet 1    vitamin D (ERGOCALCIFEROL) 1.25 MG (19724 UT) CAPS capsule Take 1 capsule by mouth once a week 12 capsule 1    escitalopram (LEXAPRO) 10 MG tablet Take 1 tablet by

## 2024-05-17 DIAGNOSIS — E53.8 B12 DEFICIENCY: ICD-10-CM

## 2024-05-17 RX ORDER — CYANOCOBALAMIN 1000 UG/ML
INJECTION, SOLUTION INTRAMUSCULAR; SUBCUTANEOUS
Qty: 1 ML | Refills: 1 | Status: SHIPPED | OUTPATIENT
Start: 2024-05-17

## 2024-06-28 ENCOUNTER — TELEPHONE (OUTPATIENT)
Dept: SURGERY | Facility: CLINIC | Age: 44
End: 2024-06-28
Payer: COMMERCIAL

## 2024-06-28 NOTE — TELEPHONE ENCOUNTER
Contacted PT to see if they wanted to return to our program and schedule their annual follow up appt. PT stated she couldn't make an appt at this time but would call us back. Letter was sent to check in on patient due to having bariatric surgery and to encourage patient to call office to schedule a follow up appointment.      [facility]  6/28

## 2024-07-01 RX ORDER — OLMESARTAN MEDOXOMIL 5 MG/1
10 TABLET ORAL DAILY
Qty: 60 TABLET | Refills: 3 | Status: SHIPPED | OUTPATIENT
Start: 2024-07-01

## 2024-07-01 NOTE — TELEPHONE ENCOUNTER
Last OV 5/16/2024  Next OV 8/14/2024      Requested Prescriptions     Pending Prescriptions Disp Refills    olmesartan (BENICAR) 5 MG tablet [Pharmacy Med Name: Olmesartan Medoxomil 5 MG Oral Tablet (BENICAR)] 60 tablet 3     Sig: Take 2 tablets by mouth daily

## 2024-08-06 DIAGNOSIS — E11.9 TYPE 2 DIABETES MELLITUS WITHOUT COMPLICATION, WITHOUT LONG-TERM CURRENT USE OF INSULIN (HCC): ICD-10-CM

## 2024-08-06 RX ORDER — SEMAGLUTIDE 2.68 MG/ML
INJECTION, SOLUTION SUBCUTANEOUS
Qty: 3 ML | Refills: 1 | OUTPATIENT
Start: 2024-08-06

## 2024-08-20 ENCOUNTER — LAB (OUTPATIENT)
Dept: LAB | Facility: HOSPITAL | Age: 44
End: 2024-08-20
Payer: COMMERCIAL

## 2024-08-20 ENCOUNTER — TRANSCRIBE ORDERS (OUTPATIENT)
Dept: ADMINISTRATIVE | Facility: HOSPITAL | Age: 44
End: 2024-08-20
Payer: COMMERCIAL

## 2024-08-20 DIAGNOSIS — F41.1 GENERALIZED ANXIETY DISORDER: ICD-10-CM

## 2024-08-20 DIAGNOSIS — E11.9 DIABETES MELLITUS WITHOUT COMPLICATION: Primary | ICD-10-CM

## 2024-08-20 DIAGNOSIS — E11.9 DIABETES MELLITUS WITHOUT COMPLICATION: ICD-10-CM

## 2024-08-20 DIAGNOSIS — K76.0 FATTY METAMORPHOSIS OF LIVER: ICD-10-CM

## 2024-08-20 DIAGNOSIS — I10 ESSENTIAL HYPERTENSION, MALIGNANT: ICD-10-CM

## 2024-08-20 DIAGNOSIS — E55.9 AVITAMINOSIS D: ICD-10-CM

## 2024-08-20 DIAGNOSIS — E53.8 BIOTIN-(PROPIONYL-COA-CARBOXYLASE) LIGASE DEFICIENCY: ICD-10-CM

## 2024-08-20 DIAGNOSIS — E78.2 MIXED HYPERLIPIDEMIA: ICD-10-CM

## 2024-08-20 LAB
25(OH)D3 SERPL-MCNC: 85.9 NG/ML (ref 30–100)
ALBUMIN SERPL-MCNC: 4.7 G/DL (ref 3.5–5)
ALBUMIN UR-MCNC: 2 MG/DL
ALBUMIN/GLOB SERPL: 1.7 G/DL (ref 1.1–2.5)
ALP SERPL-CCNC: 46 U/L (ref 24–120)
ALT SERPL W P-5'-P-CCNC: 44 U/L (ref 0–35)
ANION GAP SERPL CALCULATED.3IONS-SCNC: 7 MMOL/L (ref 4–13)
AST SERPL-CCNC: 38 U/L (ref 7–45)
AUTO MIXED CELLS #: 0.6 10*3/MM3 (ref 0.1–2.6)
AUTO MIXED CELLS %: 7.9 % (ref 0.1–24)
BILIRUB SERPL-MCNC: 0.9 MG/DL (ref 0.1–1)
BILIRUB UR QL STRIP: ABNORMAL
BUN SERPL-MCNC: 7 MG/DL (ref 5–21)
BUN/CREAT SERPL: 10.4
CALCIUM SPEC-SCNC: 9.7 MG/DL (ref 8.6–10.5)
CHLORIDE SERPL-SCNC: 107 MMOL/L (ref 98–110)
CHOLEST SERPL-MCNC: 100 MG/DL (ref 130–200)
CLARITY UR: CLEAR
CO2 SERPL-SCNC: 26 MMOL/L (ref 24–31)
COLOR UR: YELLOW
CREAT SERPL-MCNC: 0.67 MG/DL (ref 0.5–1.4)
CREAT UR-MCNC: 236.6 MG/DL
EGFRCR SERPLBLD CKD-EPI 2021: 111.4 ML/MIN/1.73
ERYTHROCYTE [DISTWIDTH] IN BLOOD BY AUTOMATED COUNT: 12.3 % (ref 12.3–15.4)
GLOBULIN UR ELPH-MCNC: 2.8 GM/DL
GLUCOSE SERPL-MCNC: 113 MG/DL (ref 70–100)
GLUCOSE UR STRIP-MCNC: NEGATIVE MG/DL
HBA1C MFR BLD: 7.1 % (ref 4.8–5.9)
HCT VFR BLD AUTO: 38.3 % (ref 34–46.6)
HDLC SERPL-MCNC: 44 MG/DL
HGB BLD-MCNC: 13.4 G/DL (ref 12–15.9)
HGB UR QL STRIP.AUTO: NEGATIVE
KETONES UR QL STRIP: NEGATIVE
LDLC SERPL CALC-MCNC: 38 MG/DL (ref 0–99)
LDLC/HDLC SERPL: 0.86 {RATIO}
LEUKOCYTE ESTERASE UR QL STRIP.AUTO: NEGATIVE
LYMPHOCYTES # BLD AUTO: 2.9 10*3/MM3 (ref 0.7–3.1)
LYMPHOCYTES NFR BLD AUTO: 40.9 % (ref 19.6–45.3)
MCH RBC QN AUTO: 30 PG (ref 26.6–33)
MCHC RBC AUTO-ENTMCNC: 35 G/DL (ref 31.5–35.7)
MCV RBC AUTO: 85.7 FL (ref 79–97)
MICROALBUMIN/CREAT UR: 8.5 MG/G (ref 0–29)
NEUTROPHILS NFR BLD AUTO: 3.5 10*3/MM3 (ref 1.7–7)
NEUTROPHILS NFR BLD AUTO: 51.2 % (ref 42.7–76)
NITRITE UR QL STRIP: NEGATIVE
PH UR STRIP.AUTO: 6 [PH] (ref 5–8)
PLATELET # BLD AUTO: 211 10*3/MM3 (ref 140–450)
PMV BLD AUTO: 11 FL (ref 6–12)
POTASSIUM SERPL-SCNC: 4.3 MMOL/L (ref 3.5–5.3)
PROT SERPL-MCNC: 7.5 G/DL (ref 6.3–8.7)
PROT UR QL STRIP: NEGATIVE
RBC # BLD AUTO: 4.47 10*6/MM3 (ref 3.77–5.28)
SODIUM SERPL-SCNC: 140 MMOL/L (ref 135–145)
SP GR UR STRIP: >=1.03 (ref 1–1.03)
TRIGL SERPL-MCNC: 90 MG/DL (ref 0–149)
TSH SERPL DL<=0.05 MIU/L-ACNC: 1.09 UIU/ML (ref 0.27–4.2)
UROBILINOGEN UR QL STRIP: ABNORMAL
VLDLC SERPL-MCNC: 18 MG/DL (ref 5–40)
WBC NRBC COR # BLD AUTO: 7 10*3/MM3 (ref 3.4–10.8)

## 2024-08-20 PROCEDURE — 83036 HEMOGLOBIN GLYCOSYLATED A1C: CPT

## 2024-08-20 PROCEDURE — 82043 UR ALBUMIN QUANTITATIVE: CPT

## 2024-08-20 PROCEDURE — 36415 COLL VENOUS BLD VENIPUNCTURE: CPT

## 2024-08-20 PROCEDURE — 81003 URINALYSIS AUTO W/O SCOPE: CPT

## 2024-08-20 PROCEDURE — 82570 ASSAY OF URINE CREATININE: CPT

## 2024-08-20 PROCEDURE — 80050 GENERAL HEALTH PANEL: CPT

## 2024-08-20 PROCEDURE — 82306 VITAMIN D 25 HYDROXY: CPT

## 2024-08-20 PROCEDURE — 80061 LIPID PANEL: CPT

## 2024-08-29 ENCOUNTER — OFFICE VISIT (OUTPATIENT)
Dept: INTERNAL MEDICINE | Age: 44
End: 2024-08-29
Payer: COMMERCIAL

## 2024-08-29 VITALS
HEIGHT: 63 IN | HEART RATE: 89 BPM | WEIGHT: 203.2 LBS | SYSTOLIC BLOOD PRESSURE: 130 MMHG | OXYGEN SATURATION: 97 % | DIASTOLIC BLOOD PRESSURE: 88 MMHG | BODY MASS INDEX: 36 KG/M2

## 2024-08-29 DIAGNOSIS — I10 ESSENTIAL HYPERTENSION: ICD-10-CM

## 2024-08-29 DIAGNOSIS — E66.09 EXOGENOUS OBESITY: ICD-10-CM

## 2024-08-29 DIAGNOSIS — R79.89 ELEVATED FERRITIN: ICD-10-CM

## 2024-08-29 DIAGNOSIS — E78.2 MIXED HYPERLIPIDEMIA: ICD-10-CM

## 2024-08-29 DIAGNOSIS — Z12.31 ENCOUNTER FOR SCREENING MAMMOGRAM FOR MALIGNANT NEOPLASM OF BREAST: ICD-10-CM

## 2024-08-29 DIAGNOSIS — E11.9 TYPE 2 DIABETES MELLITUS WITHOUT COMPLICATION, WITHOUT LONG-TERM CURRENT USE OF INSULIN (HCC): ICD-10-CM

## 2024-08-29 DIAGNOSIS — K76.0 FATTY LIVER: ICD-10-CM

## 2024-08-29 DIAGNOSIS — Z00.00 ANNUAL PHYSICAL EXAM: Primary | ICD-10-CM

## 2024-08-29 DIAGNOSIS — E53.8 B12 DEFICIENCY: ICD-10-CM

## 2024-08-29 DIAGNOSIS — E55.9 VITAMIN D DEFICIENCY: ICD-10-CM

## 2024-08-29 PROCEDURE — 3075F SYST BP GE 130 - 139MM HG: CPT | Performed by: INTERNAL MEDICINE

## 2024-08-29 PROCEDURE — 99396 PREV VISIT EST AGE 40-64: CPT | Performed by: INTERNAL MEDICINE

## 2024-08-29 PROCEDURE — 3079F DIAST BP 80-89 MM HG: CPT | Performed by: INTERNAL MEDICINE

## 2024-08-29 RX ORDER — CYANOCOBALAMIN 1000 UG/ML
1000 INJECTION, SOLUTION INTRAMUSCULAR; SUBCUTANEOUS
Qty: 1 ML | Refills: 3 | Status: SHIPPED | OUTPATIENT
Start: 2024-08-29

## 2024-08-29 ASSESSMENT — ENCOUNTER SYMPTOMS
CONSTIPATION: 0
WHEEZING: 0
SORE THROAT: 0
ABDOMINAL PAIN: 0
COUGH: 0
CHEST TIGHTNESS: 0

## 2024-08-29 NOTE — PROGRESS NOTES
Chief Complaint   Patient presents with    Annual Exam     Patient is aware to get pap scheduled   July 12th eye exam-[facility] in [place]      History of presenting illness:  Cameron Gambino is a43 y.o. female who presents today for follow up on her chronic medical conditions as noted below.      Patient Active Problem List    Diagnosis Date Noted    Coronary artery disease involving native coronary artery without angina pectoris 10/22/2020     Overview Note:     History of acute anterior wall MI 9/3/20  Status post insertion of drug-eluting stent into left anterior descending (LAD) artery      Medication intolerance 01/17/2019     Overview Note:     Intolerance to metformin      Generalized anxiety disorder 09/19/2017    Essential hypertension 09/18/2017    Type 2 diabetes mellitus without complication, without long-term current use of insulin (HCC) 09/18/2017    Fatty liver 09/18/2017    Morbid obesity due to excess calories (HCC) 09/18/2017    Mixed hyperlipidemia 09/18/2017    Chronic midline low back pain without sciatica 09/18/2017     No past medical history on file.   Past Surgical History:   Procedure Laterality Date    CHOLECYSTECTOMY       Current Outpatient Medications   Medication Sig Dispense Refill    Tirzepatide (MOUNJARO) 15 MG/0.5ML SOPN SC injection Inject 0.5 mLs into the skin once a week 6 mL 0    cyanocobalamin 1000 MCG/ML injection Inject 1 mL into the skin every 7 days 1 mL 3    olmesartan (BENICAR) 5 MG tablet Take 2 tablets by mouth daily 60 tablet 3    nitroGLYCERIN (NITROSTAT) 0.4 MG SL tablet Place 1 tablet under the tongue every 5 minutes as needed for Chest pain up to max of 3 total doses. If no relief after 1 dose, call 911. 25 tablet 0    busPIRone (BUSPAR) 10 MG tablet Take one every 12 hours as needed 180 tablet 1    rosuvastatin (CRESTOR) 40 MG tablet Take 1 tablet by mouth daily 90 tablet 1    SITagliptin (JANUVIA) 100 MG tablet Take 1 tablet by mouth daily 90 tablet 1     SCREEN W OR WO CAD BILATERAL    Vitamin B12    Hemoglobin A1C    Comprehensive Metabolic Panel    Lipid Panel    Ferritin     New Prescriptions    No medications on file         No follow-ups on file.   There are no Patient Instructions on file for this visit.  EMR Dragon/transcription disclaimer:Significant part of this  encounter note is electronic transcription/translationof spoken language to printed text. The electronic translation of spoken language may be erroneous, or at times, nonsensical words or phrases may be inadvertently transcribed. Although I have reviewed the note for sucherrors, some may still exist.

## 2024-09-03 ENCOUNTER — TRANSCRIBE ORDERS (OUTPATIENT)
Dept: ADMINISTRATIVE | Facility: HOSPITAL | Age: 44
End: 2024-09-03
Payer: COMMERCIAL

## 2024-09-03 DIAGNOSIS — Z12.31 ENCOUNTER FOR SCREENING MAMMOGRAM FOR MALIGNANT NEOPLASM OF BREAST: Primary | ICD-10-CM

## 2024-09-09 LAB
NCCN CRITERIA FLAG: NORMAL
TYRER CUZICK SCORE: 9

## 2024-09-10 ENCOUNTER — HOSPITAL ENCOUNTER (OUTPATIENT)
Dept: MAMMOGRAPHY | Facility: HOSPITAL | Age: 44
Discharge: HOME | End: 2024-09-10
Admitting: INTERNAL MEDICINE
Payer: COMMERCIAL

## 2024-09-10 DIAGNOSIS — Z12.31 ENCOUNTER FOR SCREENING MAMMOGRAM FOR MALIGNANT NEOPLASM OF BREAST: ICD-10-CM

## 2024-09-10 PROCEDURE — 77067 SCR MAMMO BI INCL CAD: CPT

## 2024-09-10 PROCEDURE — 77063 BREAST TOMOSYNTHESIS BI: CPT

## 2024-10-29 RX ORDER — EZETIMIBE 10 MG/1
10 TABLET ORAL DAILY
Qty: 90 TABLET | Refills: 1 | Status: SHIPPED | OUTPATIENT
Start: 2024-10-29

## 2024-10-29 NOTE — TELEPHONE ENCOUNTER
Cameron Gambino called to request a refill on her medication.      Last office visit : 8/29/2024   Next office visit : 1/2/2025     Requested Prescriptions     Pending Prescriptions Disp Refills    ezetimibe (ZETIA) 10 MG tablet [Pharmacy Med Name: Ezetimibe 10 MG Oral Tablet (ZETIA)] 90 tablet 1     Sig: Take 1 tablet by mouth daily            Pati Vogt MA

## 2024-10-31 RX ORDER — ROSUVASTATIN CALCIUM 40 MG/1
40 TABLET, COATED ORAL DAILY
Qty: 90 TABLET | Refills: 1 | Status: SHIPPED | OUTPATIENT
Start: 2024-10-31

## 2024-10-31 NOTE — TELEPHONE ENCOUNTER
Cameron Gambino called to request a refill on her medication.      Last office visit : 8/29/2024   Next office visit : 1/2/2025     Requested Prescriptions     Pending Prescriptions Disp Refills    rosuvastatin (CRESTOR) 40 MG tablet 90 tablet 1     Sig: Take 1 tablet by mouth daily            Pati Vogt MA

## 2024-12-13 ENCOUNTER — LAB (OUTPATIENT)
Dept: LAB | Facility: HOSPITAL | Age: 44
End: 2024-12-13
Payer: COMMERCIAL

## 2024-12-13 ENCOUNTER — TRANSCRIBE ORDERS (OUTPATIENT)
Dept: ADMINISTRATIVE | Facility: HOSPITAL | Age: 44
End: 2024-12-13
Payer: COMMERCIAL

## 2024-12-13 DIAGNOSIS — E55.9 AVITAMINOSIS D: ICD-10-CM

## 2024-12-13 DIAGNOSIS — E11.9 DIABETES MELLITUS WITHOUT COMPLICATION: ICD-10-CM

## 2024-12-13 DIAGNOSIS — Z12.31 OTHER SCREENING MAMMOGRAM: ICD-10-CM

## 2024-12-13 DIAGNOSIS — I10 ESSENTIAL HYPERTENSION, MALIGNANT: ICD-10-CM

## 2024-12-13 DIAGNOSIS — R79.89 HYPOURICEMIA: ICD-10-CM

## 2024-12-13 DIAGNOSIS — E78.2 MIXED HYPERLIPIDEMIA: ICD-10-CM

## 2024-12-13 DIAGNOSIS — E53.8 BIOTIN-(PROPIONYL-COA-CARBOXYLASE) LIGASE DEFICIENCY: ICD-10-CM

## 2024-12-13 DIAGNOSIS — R79.89 HYPOURICEMIA: Primary | ICD-10-CM

## 2024-12-13 LAB
ALBUMIN SERPL-MCNC: 4.7 G/DL (ref 3.5–5)
ALBUMIN/GLOB SERPL: 1.5 G/DL (ref 1.1–2.5)
ALP SERPL-CCNC: 45 U/L (ref 24–120)
ALT SERPL W P-5'-P-CCNC: 98 U/L (ref 0–35)
ANION GAP SERPL CALCULATED.3IONS-SCNC: 12 MMOL/L (ref 4–13)
AST SERPL-CCNC: 55 U/L (ref 7–45)
BILIRUB SERPL-MCNC: 1 MG/DL (ref 0.1–1)
BUN SERPL-MCNC: 9 MG/DL (ref 5–21)
BUN/CREAT SERPL: 12.9
CALCIUM SPEC-SCNC: 9.2 MG/DL (ref 8.6–10.5)
CHLORIDE SERPL-SCNC: 103 MMOL/L (ref 98–110)
CHOLEST SERPL-MCNC: 109 MG/DL (ref 130–200)
CO2 SERPL-SCNC: 24 MMOL/L (ref 24–31)
CREAT SERPL-MCNC: 0.7 MG/DL (ref 0.5–1.4)
EGFRCR SERPLBLD CKD-EPI 2021: 110.2 ML/MIN/1.73
FERRITIN SERPL-MCNC: 295 NG/ML (ref 13–150)
GLOBULIN UR ELPH-MCNC: 3.2 GM/DL
GLUCOSE SERPL-MCNC: 100 MG/DL (ref 65–99)
HBA1C MFR BLD: 5.3 % (ref 4.8–5.9)
HDLC SERPL-MCNC: 57 MG/DL
LDLC SERPL CALC-MCNC: 37 MG/DL (ref 0–99)
LDLC/HDLC SERPL: 0.66 {RATIO}
POTASSIUM SERPL-SCNC: 4.4 MMOL/L (ref 3.5–5.3)
PROT SERPL-MCNC: 7.9 G/DL (ref 6.3–8.7)
SODIUM SERPL-SCNC: 139 MMOL/L (ref 135–145)
TRIGL SERPL-MCNC: 71 MG/DL (ref 0–149)
VIT B12 BLD-MCNC: 581 PG/ML (ref 211–946)
VLDLC SERPL-MCNC: 15 MG/DL (ref 5–40)

## 2024-12-13 PROCEDURE — 82728 ASSAY OF FERRITIN: CPT

## 2024-12-13 PROCEDURE — 82607 VITAMIN B-12: CPT

## 2024-12-13 PROCEDURE — 83036 HEMOGLOBIN GLYCOSYLATED A1C: CPT

## 2024-12-13 PROCEDURE — 36415 COLL VENOUS BLD VENIPUNCTURE: CPT

## 2024-12-13 PROCEDURE — 80053 COMPREHEN METABOLIC PANEL: CPT

## 2024-12-13 PROCEDURE — 80061 LIPID PANEL: CPT

## 2024-12-16 ENCOUNTER — PATIENT MESSAGE (OUTPATIENT)
Dept: INTERNAL MEDICINE | Age: 44
End: 2024-12-16

## 2024-12-16 DIAGNOSIS — Z12.31 ENCOUNTER FOR SCREENING MAMMOGRAM FOR MALIGNANT NEOPLASM OF BREAST: ICD-10-CM

## 2024-12-16 DIAGNOSIS — E55.9 VITAMIN D DEFICIENCY: ICD-10-CM

## 2024-12-16 DIAGNOSIS — E53.8 B12 DEFICIENCY: ICD-10-CM

## 2024-12-16 DIAGNOSIS — I10 ESSENTIAL HYPERTENSION: ICD-10-CM

## 2024-12-16 DIAGNOSIS — E11.9 TYPE 2 DIABETES MELLITUS WITHOUT COMPLICATION, WITHOUT LONG-TERM CURRENT USE OF INSULIN (HCC): ICD-10-CM

## 2024-12-16 DIAGNOSIS — E78.2 MIXED HYPERLIPIDEMIA: ICD-10-CM

## 2024-12-16 DIAGNOSIS — R79.89 ELEVATED FERRITIN: ICD-10-CM

## 2025-01-02 ENCOUNTER — OFFICE VISIT (OUTPATIENT)
Dept: INTERNAL MEDICINE | Age: 45
End: 2025-01-02
Payer: COMMERCIAL

## 2025-01-02 VITALS
HEART RATE: 99 BPM | BODY MASS INDEX: 32.89 KG/M2 | WEIGHT: 185.6 LBS | DIASTOLIC BLOOD PRESSURE: 76 MMHG | SYSTOLIC BLOOD PRESSURE: 118 MMHG | OXYGEN SATURATION: 99 % | HEIGHT: 63 IN

## 2025-01-02 DIAGNOSIS — I10 ESSENTIAL HYPERTENSION: ICD-10-CM

## 2025-01-02 DIAGNOSIS — R74.01 ELEVATED LIVER TRANSAMINASE LEVEL: Primary | ICD-10-CM

## 2025-01-02 DIAGNOSIS — E78.2 MIXED HYPERLIPIDEMIA: ICD-10-CM

## 2025-01-02 DIAGNOSIS — K76.0 FATTY LIVER: ICD-10-CM

## 2025-01-02 DIAGNOSIS — E55.9 VITAMIN D DEFICIENCY: ICD-10-CM

## 2025-01-02 DIAGNOSIS — R79.89 ELEVATED FERRITIN: ICD-10-CM

## 2025-01-02 DIAGNOSIS — E11.9 TYPE 2 DIABETES MELLITUS WITHOUT COMPLICATION, WITHOUT LONG-TERM CURRENT USE OF INSULIN (HCC): ICD-10-CM

## 2025-01-02 PROCEDURE — 3074F SYST BP LT 130 MM HG: CPT | Performed by: INTERNAL MEDICINE

## 2025-01-02 PROCEDURE — 3078F DIAST BP <80 MM HG: CPT | Performed by: INTERNAL MEDICINE

## 2025-01-02 PROCEDURE — 99214 OFFICE O/P EST MOD 30 MIN: CPT | Performed by: INTERNAL MEDICINE

## 2025-01-02 SDOH — ECONOMIC STABILITY: INCOME INSECURITY: HOW HARD IS IT FOR YOU TO PAY FOR THE VERY BASICS LIKE FOOD, HOUSING, MEDICAL CARE, AND HEATING?: PATIENT DECLINED

## 2025-01-02 SDOH — ECONOMIC STABILITY: FOOD INSECURITY: WITHIN THE PAST 12 MONTHS, YOU WORRIED THAT YOUR FOOD WOULD RUN OUT BEFORE YOU GOT MONEY TO BUY MORE.: PATIENT DECLINED

## 2025-01-02 SDOH — ECONOMIC STABILITY: FOOD INSECURITY: WITHIN THE PAST 12 MONTHS, THE FOOD YOU BOUGHT JUST DIDN'T LAST AND YOU DIDN'T HAVE MONEY TO GET MORE.: PATIENT DECLINED

## 2025-01-02 ASSESSMENT — PATIENT HEALTH QUESTIONNAIRE - PHQ9
SUM OF ALL RESPONSES TO PHQ QUESTIONS 1-9: 0
1. LITTLE INTEREST OR PLEASURE IN DOING THINGS: NOT AT ALL
SUM OF ALL RESPONSES TO PHQ QUESTIONS 1-9: 0
2. FEELING DOWN, DEPRESSED OR HOPELESS: NOT AT ALL
SUM OF ALL RESPONSES TO PHQ9 QUESTIONS 1 & 2: 0

## 2025-01-02 ASSESSMENT — ENCOUNTER SYMPTOMS
CHEST TIGHTNESS: 0
SORE THROAT: 0
COUGH: 0
ABDOMINAL PAIN: 0
WHEEZING: 0
CONSTIPATION: 0

## 2025-01-02 NOTE — PROGRESS NOTES
Chief Complaint   Patient presents with    Follow-up     4 month      History of presenting illness:  Cameron Gambino is a44 y.o. female who presents today for follow up on her chronic medical conditions as noted below.    Patient Active Problem List    Diagnosis Date Noted    Coronary artery disease involving native coronary artery without angina pectoris 10/22/2020     Overview Note:     History of acute anterior wall MI 9/3/20  Status post insertion of drug-eluting stent into left anterior descending (LAD) artery      Medication intolerance 01/17/2019     Overview Note:     Intolerance to metformin      Generalized anxiety disorder 09/19/2017    Essential hypertension 09/18/2017    Type 2 diabetes mellitus without complication, without long-term current use of insulin (HCC) 09/18/2017    Fatty liver 09/18/2017    Morbid obesity due to excess calories 09/18/2017    Mixed hyperlipidemia 09/18/2017    Chronic midline low back pain without sciatica 09/18/2017     No past medical history on file.   Past Surgical History:   Procedure Laterality Date    CHOLECYSTECTOMY       Current Outpatient Medications   Medication Sig Dispense Refill    rosuvastatin (CRESTOR) 40 MG tablet Take 1 tablet by mouth daily 90 tablet 1    SITagliptin (JANUVIA) 100 MG tablet Take 1 tablet by mouth daily 90 tablet 1    ezetimibe (ZETIA) 10 MG tablet Take 1 tablet by mouth daily 90 tablet 1    Tirzepatide (MOUNJARO) 15 MG/0.5ML SOPN SC injection Inject 0.5 mLs into the skin once a week 6 mL 0    cyanocobalamin 1000 MCG/ML injection Inject 1 mL into the skin every 7 days 1 mL 3    olmesartan (BENICAR) 5 MG tablet Take 2 tablets by mouth daily 60 tablet 3    nitroGLYCERIN (NITROSTAT) 0.4 MG SL tablet Place 1 tablet under the tongue every 5 minutes as needed for Chest pain up to max of 3 total doses. If no relief after 1 dose, call 911. 25 tablet 0    busPIRone (BUSPAR) 10 MG tablet Take one every 12 hours as needed 180 tablet 1    vitamin D

## 2025-01-07 RX ORDER — ESCITALOPRAM OXALATE 10 MG/1
15 TABLET ORAL DAILY
Qty: 135 TABLET | Refills: 1 | Status: SHIPPED | OUTPATIENT
Start: 2025-01-07

## 2025-01-07 NOTE — TELEPHONE ENCOUNTER
Cameron Gambino called to request a refill on her medication.      Last office visit : 1/2/2025   Next office visit : 5/6/2025     Requested Prescriptions     Pending Prescriptions Disp Refills    escitalopram (LEXAPRO) 10 MG tablet [Pharmacy Med Name: Escitalopram Oxalate 10 MG Oral Tablet (LEXAPRO)] 135 tablet 0     Sig: Take 1.5 tablets by mouth Daily.            Pati Vogt MA

## 2025-01-08 DIAGNOSIS — E11.9 TYPE 2 DIABETES MELLITUS WITHOUT COMPLICATION, WITHOUT LONG-TERM CURRENT USE OF INSULIN (HCC): Primary | ICD-10-CM

## 2025-01-08 RX ORDER — TIRZEPATIDE 15 MG/.5ML
15 INJECTION, SOLUTION SUBCUTANEOUS WEEKLY
Qty: 6 ML | Refills: 0 | Status: SHIPPED | OUTPATIENT
Start: 2025-01-08

## 2025-01-08 NOTE — TELEPHONE ENCOUNTER
Cameron Gambino called to request a refill on her medication.      Last office visit : 1/2/2025   Next office visit : 5/6/2025     Requested Prescriptions     Pending Prescriptions Disp Refills    Tirzepatide (MOUNJARO) 15 MG/0.5ML SOAJ 6 mL 0     Sig: Inject 15 mg into the skin once a week            Pati Vogt MA

## 2025-01-16 DIAGNOSIS — E53.8 B12 DEFICIENCY: ICD-10-CM

## 2025-01-16 RX ORDER — CYANOCOBALAMIN 1000 UG/ML
INJECTION, SOLUTION INTRAMUSCULAR; SUBCUTANEOUS
Qty: 1 ML | Refills: 3 | Status: SHIPPED | OUTPATIENT
Start: 2025-01-16

## 2025-01-16 NOTE — TELEPHONE ENCOUNTER
Cameron Gambino called to request a refill on her medication.      Last office visit : 1/2/2025   Next office visit : 5/6/2025     Requested Prescriptions     Pending Prescriptions Disp Refills    cyanocobalamin 1000 MCG/ML injection [Pharmacy Med Name: Cyanocobalamin 1000 MCG/ML Injection Solution] 1 mL 3     Sig: Inject 1 mL into the appropriate muscle as directed by prescriber 1 (One) Time Per Week.            Pati Vogt MA

## 2025-02-21 ENCOUNTER — OFFICE VISIT (OUTPATIENT)
Dept: OBSTETRICS AND GYNECOLOGY | Age: 45
End: 2025-02-21
Payer: COMMERCIAL

## 2025-02-21 VITALS
SYSTOLIC BLOOD PRESSURE: 112 MMHG | WEIGHT: 183 LBS | BODY MASS INDEX: 32.43 KG/M2 | HEIGHT: 63 IN | DIASTOLIC BLOOD PRESSURE: 86 MMHG

## 2025-02-21 DIAGNOSIS — Z30.431 IUD CHECK UP: ICD-10-CM

## 2025-02-21 DIAGNOSIS — Z12.4 PAP SMEAR FOR CERVICAL CANCER SCREENING: ICD-10-CM

## 2025-02-21 DIAGNOSIS — E28.2 PCOS (POLYCYSTIC OVARIAN SYNDROME): ICD-10-CM

## 2025-02-21 DIAGNOSIS — Z01.419 WOMEN'S ANNUAL ROUTINE GYNECOLOGICAL EXAMINATION: Primary | ICD-10-CM

## 2025-02-21 PROBLEM — Z97.5 IUD (INTRAUTERINE DEVICE) IN PLACE: Status: ACTIVE | Noted: 2025-02-21

## 2025-02-21 LAB — PAP SMEAR, EXTERNAL: NORMAL

## 2025-02-21 PROCEDURE — G0123 SCREEN CERV/VAG THIN LAYER: HCPCS | Performed by: NURSE PRACTITIONER

## 2025-02-21 PROCEDURE — 87624 HPV HI-RISK TYP POOLED RSLT: CPT | Performed by: NURSE PRACTITIONER

## 2025-02-21 NOTE — PROGRESS NOTES
Chief Complaint   Patient presents with    Gynecologic Exam     Patient is new to our office and here to re-establish care. Patient is due for annual well GYN Exam. Last well GYN exam and pap 5/29/2020, normal/-HPV. Mirena IUD placed 10/20/2020 by Judy. Last mammo 9/10/24 ordered by PCP, BIRADS Cat 1 negative. Pt c/o decreased libido, relates due to anxiety medications. Patient denies current pelvic pain, abnormal vaginal bleeding or discharge, dyspareunia, urinary incontinence, and voices no other complaints.        History:  Harry Panda is a 44 y.o. female who presents today for evaluation of the above problems.       Harry Panda is a 44 y.o. female ,  who comes to the office today for annual GYN examination.  Not having periods with IUD  Her last Pap smear was 2020, and was normal.   No new partners   Declines STD screening  Contraception-IUD in place since 2020  Her medical history is reviewed.   History PCOS        ROS:  Review of Systems   Constitutional: Negative.    HENT: Negative.     Eyes: Negative.    Respiratory: Negative.     Cardiovascular: Negative.    Gastrointestinal: Negative.    Endocrine: Negative.    Genitourinary:  Positive for decreased libido (chronic due to mood medication).   Musculoskeletal: Negative.    Skin: Negative.    Neurological: Negative.    Psychiatric/Behavioral: Negative.         Allergies   Allergen Reactions    Penicillins Hives     Past Medical History:   Diagnosis Date    Anxiety     Cholelithiasis 2004    Chronic fatigue     Coronary artery disease     PCI 9/2020    Diabetes mellitus     Endometriosis     Female infertility     High cholesterol     Hypertension 1999    Myocardial infarction 9/3/2020    Nausea after anesthesia     Osteoarthritis     PCOS (polycystic ovarian syndrome)     Polycystic ovary syndrome     PONV (postoperative nausea and vomiting) 2004    Rheumatoid arthritis     Varicella      Past Surgical History:   Procedure Laterality  Date    BREAST SURGERY      CARDIAC CATHETERIZATION N/A 09/03/2020    Procedure: Left Heart Cath;  Surgeon: Guille Nixon MD;  Location: [facility] CATH INVASIVE LOCATION;  Service: Cardiovascular;  Laterality: N/A;    CARDIAC CATHETERIZATION N/A 09/03/2020    Procedure: Percutaneous Coronary Intervention;  Surgeon: Guille Nixon MD;  Location: [facility] CATH INVASIVE LOCATION;  Service: Cardiovascular;  Laterality: N/A;    CHOLECYSTECTOMY      lap    COLONOSCOPY N/A 10/26/2021    Procedure: COLONOSCOPY WITH ANESTHESIA;  Surgeon: Maxi Weaver DO;  Location: [facility] ENDOSCOPY;  Service: Gastroenterology;  Laterality: N/A;  pre: family hx colon cancer  post: polyp  Gayatri Aquino MD    ENDOSCOPY N/A 06/19/2020    Procedure: ESOPHAGOGASTRODUODENOSCOPY WITH ANESTHESIA;  Surgeon: Kermit Marlow MD;  Location: [facility] ENDOSCOPY;  Service: General;  Laterality: N/A;  pre: pre op scope  post op: gastritis, polyp  PCP: Gayatri Aquino MD    GASTRIC SLEEVE LAPAROSCOPIC N/A 12/08/2021    Procedure: GASTRIC SLEEVE LAPAROSCOPIC;  Surgeon: Kermit Marlow MD;  Location: [facility] OR;  Service: Bariatric;  Laterality: N/A;    LAPAROSCOPIC CHOLECYSTECTOMY  2005    WISDOM TOOTH EXTRACTION       Family History   Problem Relation Age of Onset    Cancer Mother     Hypertension Mother     Obesity Mother     Diabetes Mother     Colon cancer Mother     COPD Father     Diabetes Father     Obesity Father     Hypertension Father     Arthritis Father     Depression Father     Hearing loss Father     Prostate cancer Paternal Uncle     Breast cancer Neg Hx     Ovarian cancer Neg Hx     Uterine cancer Neg Hx       reports that she quit smoking about 18 years ago. Her smoking use included cigarettes. She started smoking about 25 years ago. She has a 4 pack-year smoking history. She has never used smokeless tobacco. She reports that she does not currently use alcohol. She reports that she does not currently use drugs.      Current  Outpatient Medications:     aspirin 81 MG EC tablet, Take 1 tablet by mouth Daily., Disp: 30 tablet, Rfl: 11    busPIRone (BUSPAR) 10 MG tablet, Take 1 tablet by mouth Every 12 (Twelve) Hours As Needed., Disp: 30 tablet, Rfl: 2    busPIRone (BUSPAR) 10 MG tablet, Take 1 tablet by mouth Every 12 (Twelve) Hours As Needed., Disp: 180 tablet, Rfl: 1    clobetasol (TEMOVATE) 0.05 % ointment, Apply twice daily to the right arm for 4 weeks, Disp: 60 g, Rfl: 3    Continuous Blood Gluc Sensor (Dexcom G6 Sensor), Use 1 each As Needed (glucose control) Every 10 days, Disp: 9 each, Rfl: 3    Continuous Blood Gluc Transmit (Dexcom G6 Transmitter) misc, Use 1 each Every 3 (Three) Months., Disp: 1 each, Rfl: 3    cyanocobalamin 1000 MCG/ML injection, Inject 1 mL into the appropriate muscle as directed by prescriber 1 (One) Time Per Week. (Patient taking differently: Inject 1 mL into the appropriate muscle as directed by prescriber Every 30 (Thirty) Days.), Disp: 1 mL, Rfl: 3    escitalopram (LEXAPRO) 10 MG tablet, Take 1.5 tablets by mouth Daily., Disp: 135 tablet, Rfl: 1    ezetimibe (ZETIA) 10 MG tablet, Take 1 tablet by mouth daily, Disp: 90 tablet, Rfl: 1    Levonorgestrel (MIRENA, 52 MG, IU), 52 mg by Intrauterine route Continuous., Disp: , Rfl:     nitroglycerin (NITROSTAT) 0.4 MG SL tablet, Place 1 tablet under the tongue Every 5 (Five) Minutes As Needed for Chest Pain. Take no more than 3 doses in 15 minutes., Disp: 25 tablet, Rfl: 12    nitroglycerin (NITROSTAT) 0.4 MG SL tablet, Place 1 tablet under the tongue every 5 minutes as needed for Chest pain up to max of 3 total doses. If no relief after 1 dose, call 911., Disp: 25 tablet, Rfl: 0    olmesartan (BENICAR) 5 MG tablet, Take 2 tablets by mouth daily, Disp: 180 tablet, Rfl: 1    olmesartan (BENICAR) 5 MG tablet, Take 2 tablets by mouth daily, Disp: 60 tablet, Rfl: 3    rosuvastatin (CRESTOR) 40 MG tablet, Take 1 tablet by mouth Daily., Disp: 90 tablet, Rfl: 1     "SITagliptin (Januvia) 100 MG tablet, Take 1 tablet by mouth Daily., Disp: 90 tablet, Rfl: 1    Syringe/Needle, Disp, (B-D 3CC LUER-HUMBERTO SYR 25GX1\") 25G X 1\" 3 ML misc, Use 1 syringe once monthly with B12 injections, Disp: 12 each, Rfl: 1    Tirzepatide (Mounjaro) 15 MG/0.5ML solution auto-injector, Inject 15 mg into the skin once a week, Disp: 6 mL, Rfl: 0    Tirzepatide (Mounjaro) 15 MG/0.5ML solution pen-injector pen, Inject 0.5 mL under the skin into the appropriate area as directed 1 (One) Time Per Week., Disp: 2 mL, Rfl: 2    Tirzepatide 15 MG/0.5ML solution auto-injector, Inject 0.5 mL under the skin into the appropriate area as directed 1 (One) Time Per Week., Disp: 6 mL, Rfl: 0    vitamin D (ERGOCALCIFEROL) 1.25 MG (32278 UT) capsule capsule, Take 1 capsule by mouth once a week, Disp: 12 capsule, Rfl: 1    OBJECTIVE:  /86   Ht 160 cm (63\")   Wt 83 kg (183 lb)   LMP 01/28/2025 (Approximate)   BMI 32.42 kg/m²    Physical Exam  Exam conducted with a chaperone present.   Constitutional:       Appearance: She is well-developed. She is obese.   HENT:      Head: Normocephalic and atraumatic.   Eyes:      General: Lids are normal.      Conjunctiva/sclera: Conjunctivae normal.      Pupils: Pupils are equal, round, and reactive to light.   Neck:      Thyroid: No thyromegaly.   Cardiovascular:      Rate and Rhythm: Normal rate and regular rhythm.      Heart sounds: Normal heart sounds.   Pulmonary:      Effort: Pulmonary effort is normal.      Breath sounds: Normal breath sounds.   Chest:   Breasts:     Breasts are symmetrical.      Right: No inverted nipple, mass, nipple discharge, skin change or tenderness.      Left: No inverted nipple, mass, nipple discharge, skin change or tenderness.   Abdominal:      General: Bowel sounds are normal.      Palpations: Abdomen is soft.   Genitourinary:     Exam position: Supine.      Labia:         Right: No rash, tenderness, lesion or injury.         Left: No rash, " tenderness, lesion or injury.       Vagina: No signs of injury and foreign body. No vaginal discharge, erythema, tenderness or bleeding.      Cervix: No cervical motion tenderness, discharge or friability.      Uterus: Not deviated, not enlarged, not fixed and not tender.       Adnexa:         Right: No mass, tenderness or fullness.          Left: No mass, tenderness or fullness.        Rectum: Normal. No tenderness or external hemorrhoid.      Comments: IUD strings present and normal on exam  Musculoskeletal:         General: Normal range of motion.      Cervical back: Normal range of motion and neck supple.   Skin:     General: Skin is warm and dry.   Neurological:      Mental Status: She is alert and oriented to person, place, and time.         Assessment/Plan    Diagnoses and all orders for this visit:    1. Women's annual routine gynecological examination (Primary)    2. Pap smear for cervical cancer screening  -     Liquid-based Pap Smear, Screening    3. PCOS (polycystic ovarian syndrome)  Comments:  we discussed risk of endometrial cancer with PCOS if not having periods without the influence of hormonal medication or IUD  patient mentioned possible tubal    4. IUD check up  Comments:  Strings normal. due for removal 2028. she mentions maybe a tubal at that time.      Preventative and Immunizations:      - Tetanus: Unknown or >10 years ago. Recommend to have at pharmacy or on injury.      - Influenza: recommended annually      - Pneumovax:once after age 65      - Prevnar: Once after age 65      - Zostavax: Once after age 60  Colon Cancer Screening: Due at 45  Mammogram: managed by PCP  PAP: done today  DEXA:  BMD testing (DXA) in all women 65 years of age and older and in postmenopausal women younger than 65 years of age with clinical risk factors for fracture   COVID vaccine information is available at vaccine.[place].gov   Additional preventative recommendations in AVS.  Yearly labs with PCP.  She will return in  one year. In the meantime if she develops questions or problems, she will notify the office.      An After Visit Summary was printed and given to the patient at discharge.  Return in about 1 year (around 2/21/2026) for Annual physical.          Caron APARICIO 2/21/2025   Electronically signed

## 2025-02-25 LAB
GEN CATEG CVX/VAG CYTO-IMP: NORMAL
HPV I/H RISK 4 DNA CVX QL PROBE+SIG AMP: NOT DETECTED
LAB AP CASE REPORT: NORMAL
LAB AP GYN ADDITIONAL INFORMATION: NORMAL
Lab: NORMAL
PATH INTERP SPEC-IMP: NORMAL
STAT OF ADQ CVX/VAG CYTO-IMP: NORMAL

## 2025-03-14 ENCOUNTER — LAB (OUTPATIENT)
Dept: LAB | Facility: HOSPITAL | Age: 45
End: 2025-03-14
Payer: COMMERCIAL

## 2025-03-14 ENCOUNTER — TRANSCRIBE ORDERS (OUTPATIENT)
Dept: ADMINISTRATIVE | Facility: HOSPITAL | Age: 45
End: 2025-03-14
Payer: COMMERCIAL

## 2025-03-14 DIAGNOSIS — R74.01 TRANSAMINITIS: Primary | ICD-10-CM

## 2025-03-14 DIAGNOSIS — R79.89 HYPOURICEMIA: ICD-10-CM

## 2025-03-14 DIAGNOSIS — R74.01 TRANSAMINITIS: ICD-10-CM

## 2025-03-14 LAB
ALBUMIN SERPL-MCNC: 4.6 G/DL (ref 3.5–5)
ALBUMIN/GLOB SERPL: 1.5 G/DL (ref 1.1–2.5)
ALP SERPL-CCNC: 30 U/L (ref 24–120)
ALT SERPL W P-5'-P-CCNC: 35 U/L (ref 0–35)
ANION GAP SERPL CALCULATED.3IONS-SCNC: 12 MMOL/L (ref 4–13)
AST SERPL-CCNC: 26 U/L (ref 7–45)
AUTO MIXED CELLS #: 0.3 10*3/MM3 (ref 0.1–2.6)
AUTO MIXED CELLS %: 5.3 % (ref 0.1–24)
BILIRUB SERPL-MCNC: 0.9 MG/DL (ref 0.1–1)
BUN SERPL-MCNC: 8 MG/DL (ref 5–21)
BUN/CREAT SERPL: 11.4
CALCIUM SPEC-SCNC: 9.2 MG/DL (ref 8.6–10.5)
CHLORIDE SERPL-SCNC: 104 MMOL/L (ref 98–110)
CO2 SERPL-SCNC: 21 MMOL/L (ref 24–31)
CREAT SERPL-MCNC: 0.7 MG/DL (ref 0.5–1.4)
EGFRCR SERPLBLD CKD-EPI 2021: 109.5 ML/MIN/1.73
ERYTHROCYTE [DISTWIDTH] IN BLOOD BY AUTOMATED COUNT: 12.4 % (ref 12.3–15.4)
FERRITIN SERPL-MCNC: 341 NG/ML (ref 13–150)
GLOBULIN UR ELPH-MCNC: 3.1 GM/DL
GLUCOSE SERPL-MCNC: 182 MG/DL (ref 65–99)
HCT VFR BLD AUTO: 39.3 % (ref 34–46.6)
HGB BLD-MCNC: 13.3 G/DL (ref 12–15.9)
LYMPHOCYTES # BLD AUTO: 2 10*3/MM3 (ref 0.7–3.1)
LYMPHOCYTES NFR BLD AUTO: 30.5 % (ref 19.6–45.3)
MCH RBC QN AUTO: 29.4 PG (ref 26.6–33)
MCHC RBC AUTO-ENTMCNC: 33.8 G/DL (ref 31.5–35.7)
MCV RBC AUTO: 86.9 FL (ref 79–97)
NEUTROPHILS NFR BLD AUTO: 4.3 10*3/MM3 (ref 1.7–7)
NEUTROPHILS NFR BLD AUTO: 64.2 % (ref 42.7–76)
PLATELET # BLD AUTO: 176 10*3/MM3 (ref 140–450)
PMV BLD AUTO: 11.2 FL (ref 6–12)
POTASSIUM SERPL-SCNC: 4.1 MMOL/L (ref 3.5–5.3)
PROT SERPL-MCNC: 7.7 G/DL (ref 6.3–8.7)
RBC # BLD AUTO: 4.52 10*6/MM3 (ref 3.77–5.28)
SODIUM SERPL-SCNC: 137 MMOL/L (ref 135–145)
WBC NRBC COR # BLD AUTO: 6.6 10*3/MM3 (ref 3.4–10.8)

## 2025-03-14 PROCEDURE — 82728 ASSAY OF FERRITIN: CPT

## 2025-03-14 PROCEDURE — 36415 COLL VENOUS BLD VENIPUNCTURE: CPT

## 2025-03-14 PROCEDURE — 80053 COMPREHEN METABOLIC PANEL: CPT

## 2025-03-14 PROCEDURE — 85025 COMPLETE CBC W/AUTO DIFF WBC: CPT

## 2025-03-16 ENCOUNTER — PATIENT MESSAGE (OUTPATIENT)
Dept: INTERNAL MEDICINE | Age: 45
End: 2025-03-16

## 2025-04-01 DIAGNOSIS — E53.8 B12 DEFICIENCY: ICD-10-CM

## 2025-04-01 DIAGNOSIS — E11.9 TYPE 2 DIABETES MELLITUS WITHOUT COMPLICATION, WITHOUT LONG-TERM CURRENT USE OF INSULIN: ICD-10-CM

## 2025-04-01 RX ORDER — TIRZEPATIDE 15 MG/.5ML
15 INJECTION, SOLUTION SUBCUTANEOUS WEEKLY
Qty: 6 ML | Refills: 0 | Status: SHIPPED | OUTPATIENT
Start: 2025-04-01

## 2025-04-01 RX ORDER — CYANOCOBALAMIN 1000 UG/ML
INJECTION, SOLUTION INTRAMUSCULAR; SUBCUTANEOUS
Qty: 1 ML | Refills: 3 | Status: SHIPPED | OUTPATIENT
Start: 2025-04-01

## 2025-04-01 RX ORDER — EZETIMIBE 10 MG/1
10 TABLET ORAL DAILY
Qty: 90 TABLET | Refills: 1 | Status: SHIPPED | OUTPATIENT
Start: 2025-04-01

## 2025-04-01 NOTE — TELEPHONE ENCOUNTER
Cameron Gambino called to request a refill on her medication.      Last office visit : 1/2/2025   Next office visit : 5/6/2025     Requested Prescriptions     Pending Prescriptions Disp Refills    ezetimibe (ZETIA) 10 MG tablet [Pharmacy Med Name: Ezetimibe 10 MG Oral Tablet (ZETIA)] 90 tablet 1     Sig: Take 1 tablet by mouth daily    MOUNJARO 15 MG/0.5ML SOAJ [Pharmacy Med Name: Mounjaro 15 MG/0.5ML Subcutaneous Solution Auto-injector (Tirzepatide)] 6 mL 0     Sig: Inject 15 mg into the skin once a week    cyanocobalamin 1000 MCG/ML injection [Pharmacy Med Name: Cyanocobalamin 1000 MCG/ML Injection Solution] 1 mL 3     Sig: Inject 1 mL into the appropriate muscle as directed by prescriber 1 (One) Time Per Week.            Pati Vogt MA

## 2025-05-01 ENCOUNTER — TRANSCRIBE ORDERS (OUTPATIENT)
Dept: ADMINISTRATIVE | Facility: HOSPITAL | Age: 45
End: 2025-05-01
Payer: COMMERCIAL

## 2025-05-01 ENCOUNTER — LAB (OUTPATIENT)
Dept: LAB | Facility: HOSPITAL | Age: 45
End: 2025-05-01
Payer: COMMERCIAL

## 2025-05-01 DIAGNOSIS — E11.9 TYPE 2 DIABETES MELLITUS WITHOUT COMPLICATION, UNSPECIFIED WHETHER LONG TERM INSULIN USE: ICD-10-CM

## 2025-05-01 DIAGNOSIS — I10 ESSENTIAL HYPERTENSION: ICD-10-CM

## 2025-05-01 DIAGNOSIS — R79.89 ELEVATED FERRITIN: ICD-10-CM

## 2025-05-01 DIAGNOSIS — E78.2 MIXED HYPERLIPIDEMIA: ICD-10-CM

## 2025-05-01 DIAGNOSIS — E55.9 VITAMIN D DEFICIENCY: ICD-10-CM

## 2025-05-01 DIAGNOSIS — R74.01 ELEVATED LIVER TRANSAMINASE LEVEL: Primary | ICD-10-CM

## 2025-05-01 DIAGNOSIS — K76.0 FATTY LIVER: ICD-10-CM

## 2025-05-01 DIAGNOSIS — R74.01 ELEVATED LIVER TRANSAMINASE LEVEL: ICD-10-CM

## 2025-05-01 LAB
25(OH)D3 SERPL-MCNC: 89.9 NG/ML (ref 30–100)
ALBUMIN SERPL-MCNC: 4.6 G/DL (ref 3.5–5)
ALBUMIN/GLOB SERPL: 1.6 G/DL (ref 1.1–2.5)
ALBUMIN: 4.6 G/DL
ALP BLD-CCNC: 20 U/L
ALP SERPL-CCNC: <20 U/L (ref 24–120)
ALT SERPL W P-5'-P-CCNC: 36 U/L (ref 0–35)
ALT SERPL-CCNC: 36 U/L
ANION GAP SERPL CALCULATED.3IONS-SCNC: 9 MMOL/L
ANION GAP SERPL CALCULATED.3IONS-SCNC: 9 MMOL/L (ref 4–13)
AST SERPL-CCNC: 55 U/L
AST SERPL-CCNC: 55 U/L (ref 7–45)
BILIRUB SERPL-MCNC: 1.5 MG/DL (ref 0.1–1)
BILIRUB SERPL-MCNC: 1.5 MG/DL (ref 0.1–1.4)
BUN BLDV-MCNC: 6 MG/DL
BUN SERPL-MCNC: 6 MG/DL (ref 5–21)
BUN/CREAT SERPL: 10
CALCIUM SERPL-MCNC: 22 MG/DL
CALCIUM SPEC-SCNC: 9.1 MG/DL (ref 8.6–10.5)
CHLORIDE BLD-SCNC: 105 MMOL/L
CHLORIDE SERPL-SCNC: 105 MMOL/L (ref 98–110)
CHOLEST SERPL-MCNC: 108 MG/DL (ref 130–200)
CHOLESTEROL, TOTAL: 108 MG/DL
CHOLESTEROL/HDL RATIO: 0.91
CO2 SERPL-SCNC: 22 MMOL/L (ref 24–31)
CO2: 22 MMOL/L
CREAT SERPL-MCNC: 0.6 MG/DL
CREAT SERPL-MCNC: 0.6 MG/DL (ref 0.5–1.4)
EGFRCR SERPLBLD CKD-EPI 2021: 113.7 ML/MIN/1.73
ESTIMATED AVERAGE GLUCOSE: NORMAL
FERRITIN SERPL-MCNC: 297 NG/ML (ref 13–150)
GFR, ESTIMATED: 113.7
GLOBULIN UR ELPH-MCNC: 2.8 GM/DL
GLUCOSE BLD-MCNC: 91 MG/DL
GLUCOSE SERPL-MCNC: 91 MG/DL (ref 65–99)
HBA1C MFR BLD: 5.2 %
HBA1C MFR BLD: 5.2 % (ref 4.8–5.9)
HDLC SERPL-MCNC: 49 MG/DL
HDLC SERPL-MCNC: 49 MG/DL (ref 35–70)
LDL CHOLESTEROL: 44
LDLC SERPL CALC-MCNC: 44 MG/DL (ref 0–99)
LDLC/HDLC SERPL: 0.91 {RATIO}
NONHDLC SERPL-MCNC: ABNORMAL MG/DL
POTASSIUM SERPL-SCNC: 5.2 MMOL/L
POTASSIUM SERPL-SCNC: 5.2 MMOL/L (ref 3.5–5.3)
PROT SERPL-MCNC: 7.4 G/DL (ref 6.3–8.7)
SODIUM BLD-SCNC: 136 MMOL/L
SODIUM SERPL-SCNC: 136 MMOL/L (ref 135–145)
TOTAL PROTEIN: 7.4 G/DL (ref 6.4–8.2)
TRIGL SERPL-MCNC: 71 MG/DL
TRIGL SERPL-MCNC: 71 MG/DL (ref 0–149)
VLDLC SERPL CALC-MCNC: 15 MG/DL
VLDLC SERPL-MCNC: 15 MG/DL (ref 5–40)

## 2025-05-01 PROCEDURE — 80053 COMPREHEN METABOLIC PANEL: CPT

## 2025-05-01 PROCEDURE — 82306 VITAMIN D 25 HYDROXY: CPT

## 2025-05-01 PROCEDURE — 80061 LIPID PANEL: CPT

## 2025-05-01 PROCEDURE — 82728 ASSAY OF FERRITIN: CPT

## 2025-05-01 PROCEDURE — 36415 COLL VENOUS BLD VENIPUNCTURE: CPT

## 2025-05-01 PROCEDURE — 83036 HEMOGLOBIN GLYCOSYLATED A1C: CPT

## 2025-05-03 SDOH — ECONOMIC STABILITY: FOOD INSECURITY: WITHIN THE PAST 12 MONTHS, YOU WORRIED THAT YOUR FOOD WOULD RUN OUT BEFORE YOU GOT MONEY TO BUY MORE.: NEVER TRUE

## 2025-05-03 SDOH — ECONOMIC STABILITY: TRANSPORTATION INSECURITY
IN THE PAST 12 MONTHS, HAS THE LACK OF TRANSPORTATION KEPT YOU FROM MEDICAL APPOINTMENTS OR FROM GETTING MEDICATIONS?: NO

## 2025-05-03 SDOH — ECONOMIC STABILITY: FOOD INSECURITY: WITHIN THE PAST 12 MONTHS, THE FOOD YOU BOUGHT JUST DIDN'T LAST AND YOU DIDN'T HAVE MONEY TO GET MORE.: NEVER TRUE

## 2025-05-03 SDOH — ECONOMIC STABILITY: INCOME INSECURITY: IN THE LAST 12 MONTHS, WAS THERE A TIME WHEN YOU WERE NOT ABLE TO PAY THE MORTGAGE OR RENT ON TIME?: NO

## 2025-05-06 ENCOUNTER — OFFICE VISIT (OUTPATIENT)
Dept: INTERNAL MEDICINE | Age: 45
End: 2025-05-06
Payer: COMMERCIAL

## 2025-05-06 VITALS
HEART RATE: 79 BPM | WEIGHT: 172.4 LBS | DIASTOLIC BLOOD PRESSURE: 88 MMHG | BODY MASS INDEX: 30.55 KG/M2 | HEIGHT: 63 IN | OXYGEN SATURATION: 99 % | SYSTOLIC BLOOD PRESSURE: 110 MMHG

## 2025-05-06 DIAGNOSIS — E66.09 EXOGENOUS OBESITY: ICD-10-CM

## 2025-05-06 DIAGNOSIS — R74.01 ELEVATED LIVER TRANSAMINASE LEVEL: ICD-10-CM

## 2025-05-06 DIAGNOSIS — R79.89 ELEVATED FERRITIN: ICD-10-CM

## 2025-05-06 DIAGNOSIS — K76.0 FATTY LIVER: ICD-10-CM

## 2025-05-06 DIAGNOSIS — Z00.00 ANNUAL PHYSICAL EXAM: ICD-10-CM

## 2025-05-06 DIAGNOSIS — E55.9 VITAMIN D DEFICIENCY: ICD-10-CM

## 2025-05-06 DIAGNOSIS — F41.1 GENERALIZED ANXIETY DISORDER: ICD-10-CM

## 2025-05-06 DIAGNOSIS — E78.2 MIXED HYPERLIPIDEMIA: ICD-10-CM

## 2025-05-06 DIAGNOSIS — E53.8 B12 DEFICIENCY: ICD-10-CM

## 2025-05-06 DIAGNOSIS — E11.9 TYPE 2 DIABETES MELLITUS WITHOUT COMPLICATION, WITHOUT LONG-TERM CURRENT USE OF INSULIN (HCC): ICD-10-CM

## 2025-05-06 DIAGNOSIS — I10 ESSENTIAL HYPERTENSION: Primary | ICD-10-CM

## 2025-05-06 PROCEDURE — 99214 OFFICE O/P EST MOD 30 MIN: CPT | Performed by: INTERNAL MEDICINE

## 2025-05-06 PROCEDURE — 3079F DIAST BP 80-89 MM HG: CPT | Performed by: INTERNAL MEDICINE

## 2025-05-06 PROCEDURE — 3074F SYST BP LT 130 MM HG: CPT | Performed by: INTERNAL MEDICINE

## 2025-05-06 RX ORDER — OLMESARTAN MEDOXOMIL 5 MG/1
10 TABLET ORAL DAILY
Qty: 180 TABLET | Refills: 1 | Status: SHIPPED | OUTPATIENT
Start: 2025-05-06 | End: 2026-05-01

## 2025-05-06 ASSESSMENT — ENCOUNTER SYMPTOMS
CHEST TIGHTNESS: 0
WHEEZING: 0
COUGH: 0
ABDOMINAL PAIN: 0
SORE THROAT: 0
CONSTIPATION: 0

## 2025-05-06 NOTE — PROGRESS NOTES
Chief Complaint   Patient presents with    Follow-up     4 months      History of presenting illness:  Cameron Gambino is a44 y.o. female who presents today for follow up on her chronic medical conditions as noted below.    Patient Active Problem List    Diagnosis Date Noted    Coronary artery disease involving native coronary artery without angina pectoris 10/22/2020     Overview Note:     History of acute anterior wall MI 9/3/20  Status post insertion of drug-eluting stent into left anterior descending (LAD) artery      Medication intolerance 01/17/2019     Overview Note:     Intolerance to metformin      Generalized anxiety disorder 09/19/2017    Essential hypertension 09/18/2017    Type 2 diabetes mellitus without complication, without long-term current use of insulin (HCC) 09/18/2017    Fatty liver 09/18/2017    Morbid obesity due to excess calories (HCC) 09/18/2017    Mixed hyperlipidemia 09/18/2017    Chronic midline low back pain without sciatica 09/18/2017     No past medical history on file.   Past Surgical History:   Procedure Laterality Date    CHOLECYSTECTOMY       Current Outpatient Medications   Medication Sig Dispense Refill    olmesartan (BENICAR) 5 MG tablet Take 2 tablets by mouth daily 180 tablet 1    ezetimibe (ZETIA) 10 MG tablet Take 1 tablet by mouth daily 90 tablet 1    cyanocobalamin 1000 MCG/ML injection Inject 1 mL into the appropriate muscle as directed by prescriber 1 (One) Time Per Week. 1 mL 3    FLUoxetine (PROZAC) 20 MG capsule Take 1 capsule by mouth daily 90 capsule 1    rosuvastatin (CRESTOR) 40 MG tablet Take 1 tablet by mouth daily 90 tablet 1    SITagliptin (JANUVIA) 100 MG tablet Take 1 tablet by mouth daily 90 tablet 1    Tirzepatide (MOUNJARO) 15 MG/0.5ML SOPN SC injection Inject 0.5 mLs into the skin once a week 6 mL 0    nitroGLYCERIN (NITROSTAT) 0.4 MG SL tablet Place 1 tablet under the tongue every 5 minutes as needed for Chest pain up to max of 3 total doses. If no

## 2025-07-10 ENCOUNTER — OFFICE VISIT (OUTPATIENT)
Dept: BARIATRICS/WEIGHT MGMT | Facility: CLINIC | Age: 45
End: 2025-07-10
Payer: COMMERCIAL

## 2025-07-10 VITALS
OXYGEN SATURATION: 98 % | DIASTOLIC BLOOD PRESSURE: 95 MMHG | SYSTOLIC BLOOD PRESSURE: 138 MMHG | HEIGHT: 64 IN | WEIGHT: 154.9 LBS | BODY MASS INDEX: 26.44 KG/M2

## 2025-07-10 DIAGNOSIS — E66.3 OVERWEIGHT WITH BODY MASS INDEX (BMI) OF 26 TO 26.9 IN ADULT: Primary | ICD-10-CM

## 2025-07-10 DIAGNOSIS — I10 PRIMARY HYPERTENSION: ICD-10-CM

## 2025-07-10 PROCEDURE — 99213 OFFICE O/P EST LOW 20 MIN: CPT | Performed by: NURSE PRACTITIONER

## 2025-07-10 RX ORDER — EZETIMIBE 10 MG/1
10 TABLET ORAL DAILY
COMMUNITY
Start: 2025-04-01

## 2025-07-10 NOTE — ASSESSMENT & PLAN NOTE
Patient's (Body mass index is 26.8 kg/m².) indicates that they are overweight with health conditions that include hypertension . Weight is improving with treatment. BMI is above average; BMI management plan is completed. We discussed portion control and increasing exercise.

## 2025-07-10 NOTE — PROGRESS NOTES
"Patient Care Team:  Gayatri Aquino MD as PCP - General  Gayatri Aquino MD as PCP - Family Medicine  Guille Nixon MD as Cardiologist (Cardiology)    Chief Complaint: S/P Sleeve Gastrectomy    Subjective     History of Present Illness  This is a 44-year-old female with a history of sleeve gastrectomy presenting for a 6-month follow-up.    She reports feeling well overall and has been making significant lifestyle changes, including adopting a more plant-based diet and avoiding processed foods. She acknowledges that she is not consuming enough food but is nearing her goal weight. She is meeting her hydration targets but believes she is falling short on protein intake. She has an exercise regimen, which includes weight training at home with a 5-pound weight and using a thigh exerciser. She also has a home gym and enjoys walking. She is considering breast lift surgery and has experienced rashes under her breasts.    She had to pause her weight loss journey due to a heart attack, which required her to be on anticoagulants for a year. She was scheduled for surgery in 12/2021 but had to postpone it.    She has vitamin B12 deficiency and receives injections for it. Her vitamin D levels are closely monitored and are currently within the normal range. All her lab results are satisfactory, except for her ferritin levels, which remain high. She takes iron-free vitamins due to this issue. She suspects that her elevated ferritin levels may be related to arthritis and inflammation. She has a history of endometriosis.    PAST SURGICAL HISTORY:  Sleeve gastrectomy in 12/2021      Objective     Vital Signs  /95 (BP Location: Right arm, Patient Position: Sitting, Cuff Size: Adult)   Ht 161.9 cm (63.75\")   Wt 70.3 kg (154 lb 14.4 oz)   SpO2 98%   BMI 26.80 kg/m²          Physical Exam:  Physical Exam  Vitals reviewed.   Constitutional:       Appearance: She is obese.   Cardiovascular:      Rate and Rhythm: Normal rate " and regular rhythm.   Pulmonary:      Effort: Pulmonary effort is normal.   Musculoskeletal:         General: Normal range of motion.   Skin:     General: Skin is warm and dry.   Neurological:      Mental Status: She is alert and oriented to person, place, and time.   Psychiatric:         Mood and Affect: Mood normal.         Behavior: Behavior normal.          Physical Exam  Integument/Skin: Patient reports rashes, especially under the breasts.     Results Review:    Labs reviewed    Results  Laboratory Studies  Ferritin levels are high.      Medication Reviewed:   Current Outpatient Medications   Medication Sig Dispense Refill    aspirin 81 MG EC tablet Take 1 tablet by mouth Daily. 30 tablet 11    busPIRone (BUSPAR) 10 MG tablet Take 1 tablet by mouth Every 12 (Twelve) Hours As Needed. 30 tablet 2    busPIRone (BUSPAR) 10 MG tablet Take 1 tablet by mouth Every 12 (Twelve) Hours As Needed. 180 tablet 1    clobetasol (TEMOVATE) 0.05 % ointment Apply twice daily to the right arm for 4 weeks 60 g 3    Continuous Blood Gluc Sensor (Dexcom G6 Sensor) Use 1 each As Needed (glucose control) Every 10 days 9 each 3    Continuous Blood Gluc Transmit (Dexcom G6 Transmitter) misc Use 1 each Every 3 (Three) Months. 1 each 3    cyanocobalamin 1000 MCG/ML injection Inject 1 mL into the appropriate muscle as directed by prescriber 1 (One) Time Per Week. 1 mL 3    ezetimibe (ZETIA) 10 MG tablet Take 1 tablet by mouth Daily.      FLUoxetine (PROzac) 20 MG capsule Take 1 capsule by mouth Daily.      Levonorgestrel (MIRENA, 52 MG, IU) 52 mg by Intrauterine route Continuous.      nitroglycerin (NITROSTAT) 0.4 MG SL tablet Place 1 tablet under the tongue Every 5 (Five) Minutes As Needed for Chest Pain. Take no more than 3 doses in 15 minutes. 25 tablet 12    nitroglycerin (NITROSTAT) 0.4 MG SL tablet Place 1 tablet under the tongue every 5 minutes as needed for Chest pain up to max of 3 total doses. If no relief after 1 dose, call 911.  "25 tablet 0    olmesartan (BENICAR) 5 MG tablet Take 2 tablets by mouth daily 180 tablet 1    olmesartan (BENICAR) 5 MG tablet Take 2 tablets by mouth daily 180 tablet 1    rosuvastatin (CRESTOR) 40 MG tablet Take 1 tablet by mouth Daily. 90 tablet 1    SITagliptin (Januvia) 100 MG tablet Take 1 tablet by mouth Daily. 90 tablet 1    Syringe/Needle, Disp, (B-D 3CC LUER-HUMBERTO SYR 25GX1\") 25G X 1\" 3 ML misc Use 1 syringe once monthly with B12 injections 12 each 1    Tirzepatide (Mounjaro) 15 MG/0.5ML solution pen-injector pen Inject 0.5 mL under the skin into the appropriate area as directed 1 (One) Time Per Week. 2 mL 2    Tirzepatide 15 MG/0.5ML solution auto-injector Inject 0.5 mL under the skin into the appropriate area as directed 1 (One) Time Per Week. 6 mL 0    vitamin D (ERGOCALCIFEROL) 1.25 MG (70001 UT) capsule capsule Take 1 capsule by mouth once a week 12 capsule 1     No current facility-administered medications for this visit.       Assessment & Plan      Assessment & Plan    44-year-old female, 6-month follow-up post sleeve gastrectomy, currently in the maintenance phase of her weight loss journey. BMI of 26, weight of 154 pounds, significant improvement from BMI of 42 and weight of 245 pounds in 09/2020.      Post-sleeve gastrectomy follow-up. Maintenance phase with significant weight loss. Advised on dietary intake, exercise regimen, and vitamin monitoring.      - Post-sleeve gastrectomy follow-up  - Vitamin B12 deficiency  - Elevated ferritin levels  - Cardiac embolism   maintenance phase of weight loss journey.  Follow-Up: Yearly panel of vitamins with primary care physician, Dr. Bailon.      Handouts on fiber and portion control provided. Advised on dietary intake: sugar-free Jell-O, popsicles, extra protein shake if needed, measure half a cup of food focusing on protein or fiber, daily intake not to exceed one cup of food divided into four meals and a shake, minimum 65 g of protein and 25 g of fiber. " Exercise regimen: 150 minutes of cardio per week, two days of weight resistance training.    POD 4 1/2 years from Sleeve Gastrectomy.  Diagnoses and all orders for this visit:    1. Overweight with body mass index (BMI) of 26 to 26.9 in adult (Primary)  Assessment & Plan:  Patient's (Body mass index is 26.8 kg/m².) indicates that they are overweight with health conditions that include hypertension . Weight is improving with treatment. BMI is above average; BMI management plan is completed. We discussed portion control and increasing exercise.       2. Primary hypertension           Harry Panda and I discussed the importance of changing behavior for consistent and successful weight loss. We first reviewed again the definition of a meal which is defined as portion sizes less than a half a cup and those portions incorporating a protein. Specifically the protein should fill half of that volume. I also explained that she should attempt to consume 4 meals as defined above daily and to avoid snacking or grazing. She should also be mindful of adequate hydration by consuming at least 64 oz of water daily and incorporation of a regular exercise regimen.     I discussed the importance of taking her multivitamins as directed.        MARKUS Terry  07/10/25  09:19 CDT    Patient or patient representative verbalized consent for the use of Ambient Listening during the visit with  MARKUS Terry for chart documentation. 7/10/2025  08:26 CDT

## 2025-07-15 ENCOUNTER — PATIENT MESSAGE (OUTPATIENT)
Dept: INTERNAL MEDICINE | Age: 45
End: 2025-07-15

## 2025-07-15 DIAGNOSIS — E11.9 TYPE 2 DIABETES MELLITUS WITHOUT COMPLICATION, WITHOUT LONG-TERM CURRENT USE OF INSULIN (HCC): Primary | ICD-10-CM

## 2025-07-15 DIAGNOSIS — E53.8 B12 DEFICIENCY: ICD-10-CM

## 2025-07-15 RX ORDER — CYANOCOBALAMIN 1000 UG/ML
1000 INJECTION, SOLUTION INTRAMUSCULAR; SUBCUTANEOUS
Qty: 3 ML | Refills: 1 | Status: SHIPPED | OUTPATIENT
Start: 2025-07-15

## 2025-07-15 RX ORDER — OLMESARTAN MEDOXOMIL 5 MG/1
10 TABLET ORAL DAILY
Qty: 180 TABLET | Refills: 1 | Status: SHIPPED | OUTPATIENT
Start: 2025-07-15 | End: 2026-07-10

## 2025-07-15 RX ORDER — ROSUVASTATIN CALCIUM 40 MG/1
40 TABLET, COATED ORAL DAILY
Qty: 90 TABLET | Refills: 1 | Status: SHIPPED | OUTPATIENT
Start: 2025-07-15

## 2025-07-15 RX ORDER — EZETIMIBE 10 MG/1
10 TABLET ORAL DAILY
Qty: 90 TABLET | Refills: 1 | Status: SHIPPED | OUTPATIENT
Start: 2025-07-15

## 2025-07-15 NOTE — TELEPHONE ENCOUNTER
Cameron Gambino called to request a refill on her medication.      Last office visit : 5/6/2025   Next office visit : 8/19/2025     Requested Prescriptions     Pending Prescriptions Disp Refills    Tirzepatide (MOUNJARO) 15 MG/0.5ML SOAJ pen 2 mL 2     Sig: Inject 15 mg into the skin once a week            Pati Vogt MA

## 2025-08-15 ENCOUNTER — TRANSCRIBE ORDERS (OUTPATIENT)
Dept: ADMINISTRATIVE | Facility: HOSPITAL | Age: 45
End: 2025-08-15
Payer: COMMERCIAL

## 2025-08-15 ENCOUNTER — LAB (OUTPATIENT)
Dept: LAB | Facility: HOSPITAL | Age: 45
End: 2025-08-15
Payer: COMMERCIAL

## 2025-08-15 DIAGNOSIS — E55.9 VITAMIN D DEFICIENCY: ICD-10-CM

## 2025-08-15 DIAGNOSIS — E53.8 B12 DEFICIENCY: ICD-10-CM

## 2025-08-15 DIAGNOSIS — Z00.00 ANNUAL PHYSICAL EXAM: ICD-10-CM

## 2025-08-15 DIAGNOSIS — E78.2 MIXED HYPERLIPIDEMIA: ICD-10-CM

## 2025-08-15 DIAGNOSIS — K76.0 FATTY LIVER: ICD-10-CM

## 2025-08-15 DIAGNOSIS — E66.09 EXOGENOUS OBESITY: ICD-10-CM

## 2025-08-15 DIAGNOSIS — I10 ESSENTIAL HYPERTENSION: Primary | ICD-10-CM

## 2025-08-15 DIAGNOSIS — F41.1 GENERALIZED ANXIETY DISORDER: ICD-10-CM

## 2025-08-15 DIAGNOSIS — E11.9 TYPE 2 DIABETES MELLITUS WITHOUT COMPLICATION, WITHOUT LONG-TERM CURRENT USE OF INSULIN: ICD-10-CM

## 2025-08-15 DIAGNOSIS — I10 ESSENTIAL HYPERTENSION: ICD-10-CM

## 2025-08-15 DIAGNOSIS — R79.89 ELEVATED FERRITIN: ICD-10-CM

## 2025-08-15 LAB
25(OH)D3 SERPL-MCNC: 59.5 NG/ML (ref 30–100)
ALBUMIN SERPL-MCNC: 4.6 G/DL (ref 3.5–5)
ALBUMIN UR-MCNC: 3.2 MG/DL
ALBUMIN/GLOB SERPL: 1.7 G/DL (ref 1.1–2.5)
ALP SERPL-CCNC: 34 U/L (ref 24–120)
ALT SERPL W P-5'-P-CCNC: 24 U/L (ref 0–35)
ANION GAP SERPL CALCULATED.3IONS-SCNC: 8 MMOL/L (ref 4–13)
AST SERPL-CCNC: 25 U/L (ref 7–45)
AUTO MIXED CELLS #: 0.3 10*3/MM3 (ref 0.1–2.6)
AUTO MIXED CELLS %: 7.1 % (ref 0.1–24)
BILIRUB SERPL-MCNC: 1 MG/DL (ref 0.1–1)
BILIRUB UR QL STRIP: ABNORMAL
BUN SERPL-MCNC: 7 MG/DL (ref 5–21)
BUN/CREAT SERPL: 10
CALCIUM SPEC-SCNC: 9.4 MG/DL (ref 8.6–10.5)
CHLORIDE SERPL-SCNC: 107 MMOL/L (ref 98–110)
CLARITY UR: CLEAR
CO2 SERPL-SCNC: 23 MMOL/L (ref 24–31)
COLOR UR: YELLOW
CREAT SERPL-MCNC: 0.7 MG/DL (ref 0.5–1.4)
CREAT UR-MCNC: 432.9 MG/DL
EGFRCR SERPLBLD CKD-EPI 2021: 109.5 ML/MIN/1.73
ERYTHROCYTE [DISTWIDTH] IN BLOOD BY AUTOMATED COUNT: 12.6 % (ref 12.3–15.4)
FERRITIN SERPL-MCNC: 271 NG/ML (ref 13–150)
GLOBULIN UR ELPH-MCNC: 2.7 GM/DL
GLUCOSE SERPL-MCNC: 86 MG/DL (ref 65–99)
GLUCOSE UR STRIP-MCNC: NEGATIVE MG/DL
HBA1C MFR BLD: 5.1 % (ref 4.8–5.9)
HCT VFR BLD AUTO: 39.5 % (ref 34–46.6)
HGB BLD-MCNC: 13.6 G/DL (ref 12–15.9)
HGB UR QL STRIP.AUTO: NEGATIVE
KETONES UR QL STRIP: ABNORMAL
LEUKOCYTE ESTERASE UR QL STRIP.AUTO: NEGATIVE
LYMPHOCYTES # BLD AUTO: 1.8 10*3/MM3 (ref 0.7–3.1)
LYMPHOCYTES NFR BLD AUTO: 40.2 % (ref 19.6–45.3)
MCH RBC QN AUTO: 30.2 PG (ref 26.6–33)
MCHC RBC AUTO-ENTMCNC: 34.4 G/DL (ref 31.5–35.7)
MCV RBC AUTO: 87.8 FL (ref 79–97)
MICROALBUMIN/CREAT UR: 7.4 MG/G (ref 0–29)
NEUTROPHILS NFR BLD AUTO: 2.5 10*3/MM3 (ref 1.7–7)
NEUTROPHILS NFR BLD AUTO: 52.7 % (ref 42.7–76)
NITRITE UR QL STRIP: NEGATIVE
PH UR STRIP.AUTO: 6 [PH] (ref 5–8)
PLATELET # BLD AUTO: 193 10*3/MM3 (ref 140–450)
PMV BLD AUTO: 11.3 FL (ref 6–12)
POTASSIUM SERPL-SCNC: 4.2 MMOL/L (ref 3.5–5.3)
PROT SERPL-MCNC: 7.3 G/DL (ref 6.3–8.7)
PROT UR QL STRIP: ABNORMAL
RBC # BLD AUTO: 4.5 10*6/MM3 (ref 3.77–5.28)
SODIUM SERPL-SCNC: 138 MMOL/L (ref 135–145)
SP GR UR STRIP: 1.02 (ref 1–1.03)
TSH SERPL DL<=0.05 MIU/L-ACNC: 1.13 UIU/ML (ref 0.27–4.2)
UROBILINOGEN UR QL STRIP: ABNORMAL
VIT B12 BLD-MCNC: 1131 PG/ML (ref 211–946)
WBC NRBC COR # BLD AUTO: 4.6 10*3/MM3 (ref 3.4–10.8)

## 2025-08-15 PROCEDURE — 80050 GENERAL HEALTH PANEL: CPT

## 2025-08-15 PROCEDURE — 82043 UR ALBUMIN QUANTITATIVE: CPT

## 2025-08-15 PROCEDURE — 36415 COLL VENOUS BLD VENIPUNCTURE: CPT

## 2025-08-15 PROCEDURE — 82728 ASSAY OF FERRITIN: CPT

## 2025-08-15 PROCEDURE — 82570 ASSAY OF URINE CREATININE: CPT

## 2025-08-15 PROCEDURE — 83036 HEMOGLOBIN GLYCOSYLATED A1C: CPT

## 2025-08-15 PROCEDURE — 81003 URINALYSIS AUTO W/O SCOPE: CPT

## 2025-08-15 PROCEDURE — 82306 VITAMIN D 25 HYDROXY: CPT

## 2025-08-15 PROCEDURE — 82607 VITAMIN B-12: CPT

## 2025-08-19 ENCOUNTER — OFFICE VISIT (OUTPATIENT)
Dept: INTERNAL MEDICINE | Age: 45
End: 2025-08-19
Payer: COMMERCIAL

## 2025-08-19 VITALS
WEIGHT: 152.6 LBS | OXYGEN SATURATION: 99 % | SYSTOLIC BLOOD PRESSURE: 130 MMHG | HEART RATE: 88 BPM | DIASTOLIC BLOOD PRESSURE: 88 MMHG | BODY MASS INDEX: 27.03 KG/M2

## 2025-08-19 DIAGNOSIS — E55.9 VITAMIN D DEFICIENCY: ICD-10-CM

## 2025-08-19 DIAGNOSIS — Z12.31 ENCOUNTER FOR SCREENING MAMMOGRAM FOR MALIGNANT NEOPLASM OF BREAST: ICD-10-CM

## 2025-08-19 DIAGNOSIS — Z00.00 ANNUAL PHYSICAL EXAM: Primary | ICD-10-CM

## 2025-08-19 DIAGNOSIS — M54.6 THORACIC SPINE PAIN: ICD-10-CM

## 2025-08-19 DIAGNOSIS — R74.01 ELEVATED LIVER TRANSAMINASE LEVEL: ICD-10-CM

## 2025-08-19 DIAGNOSIS — R79.89 ELEVATED FERRITIN: ICD-10-CM

## 2025-08-19 DIAGNOSIS — E53.8 B12 DEFICIENCY: ICD-10-CM

## 2025-08-19 DIAGNOSIS — E66.09 EXOGENOUS OBESITY: ICD-10-CM

## 2025-08-19 DIAGNOSIS — I10 ESSENTIAL HYPERTENSION: ICD-10-CM

## 2025-08-19 DIAGNOSIS — E78.2 MIXED HYPERLIPIDEMIA: ICD-10-CM

## 2025-08-19 DIAGNOSIS — E11.9 TYPE 2 DIABETES MELLITUS WITHOUT COMPLICATION, WITHOUT LONG-TERM CURRENT USE OF INSULIN (HCC): ICD-10-CM

## 2025-08-19 DIAGNOSIS — K76.0 FATTY LIVER: ICD-10-CM

## 2025-08-19 PROCEDURE — 3075F SYST BP GE 130 - 139MM HG: CPT | Performed by: INTERNAL MEDICINE

## 2025-08-19 PROCEDURE — 3079F DIAST BP 80-89 MM HG: CPT | Performed by: INTERNAL MEDICINE

## 2025-08-19 PROCEDURE — 99396 PREV VISIT EST AGE 40-64: CPT | Performed by: INTERNAL MEDICINE

## 2025-08-19 RX ORDER — METHOCARBAMOL 750 MG/1
750 TABLET, FILM COATED ORAL NIGHTLY PRN
Qty: 30 TABLET | Refills: 0 | Status: SHIPPED | OUTPATIENT
Start: 2025-08-19 | End: 2025-09-18

## 2025-08-19 ASSESSMENT — ENCOUNTER SYMPTOMS
ABDOMINAL PAIN: 0
CHEST TIGHTNESS: 0
SORE THROAT: 0
CONSTIPATION: 0
WHEEZING: 0
COUGH: 0

## (undated) DEVICE — PINNACLE INTRODUCER SHEATH: Brand: PINNACLE

## (undated) DEVICE — TBG SMPL FLTR LINE NASL 02/C02 A/ BX/100

## (undated) DEVICE — Device: Brand: PROWATER

## (undated) DEVICE — INFLATION DEVICE: Brand: ENCORE™ 26

## (undated) DEVICE — VISIGI 3D®  CALIBRATION SYSTEM  SIZE 40FR STD W/ BULB: Brand: BOEHRINGER® VISIGI 3D™ SLEEVE GASTRECTOMY CALIBRATION SYSTEM, SIZE 40FR W/BULB

## (undated) DEVICE — 6F .070 XB 3.5 100CM: Brand: VISTA BRITE TIP

## (undated) DEVICE — SYS CLOSE PORTII CARTR/THOMASN XL

## (undated) DEVICE — FRCP BX RADJAW4 NDL 2.8 240 STD OG

## (undated) DEVICE — THE CHANNEL CLEANING BRUSH IS A NYLON FLEXI BRUSH ATTACHED TO A FLEXIBLE PLASTIC SHEATH DESIGNED TO SAFELY REMOVE DEBRIS FROM FLEXIBLE ENDOSCOPES.

## (undated) DEVICE — ELECTRD PAD DEFIB A/

## (undated) DEVICE — GLV SURG DERMASSURE GRN LF PF 8.5

## (undated) DEVICE — SYR LL TP 10ML STRL

## (undated) DEVICE — INF TL MULIPACK FR6: Brand: INFINITI

## (undated) DEVICE — CANN CO2/O2 NASL A/

## (undated) DEVICE — GOWN,NON-REINFORCED,SIRUS,SET IN SLV,XXL: Brand: MEDLINE

## (undated) DEVICE — SOL IRR NACL 0.9PCT BT 1000ML

## (undated) DEVICE — ENDOPATH XCEL BLADELESS TROCARS WITH STABILITY SLEEVES: Brand: ENDOPATH XCEL

## (undated) DEVICE — SNAR POLYP SENSATION MICRO OVL 13 240X40

## (undated) DEVICE — ADHS SKIN PREMIERPRO EXOFIN TOPICAL HI/VISC .5ML

## (undated) DEVICE — MASK,OXYGEN,MED CONC,ADLT,7' TUB, UC: Brand: PENDING

## (undated) DEVICE — Device: Brand: DEFENDO AIR/WATER/SUCTION AND BIOPSY VALVE

## (undated) DEVICE — HARMONIC ACE +7 LAPAROSCOPIC SHEARS ADVANCED HEMOSTASIS 5MM DIAMETER 45CM SHAFT LENGTH  FOR USE WITH GRAY HAND PIECE ONLY: Brand: HARMONIC ACE

## (undated) DEVICE — BNDR ABD 4PANEL 12IN 74TO85IN

## (undated) DEVICE — KT VLV HEMO MAP ACC PLS LG/BORE MTL/INTRO

## (undated) DEVICE — SUT MNCRYL 4/0 RB1 27IN UD MCP214H

## (undated) DEVICE — NDL HYPO PRECISIONGLIDE REG 22G 1 1/2

## (undated) DEVICE — SUT VIC 0 SUTUPAK TIES 18IN J906G

## (undated) DEVICE — ECHELON FLEX POWERED PLUS LONG ARTICULATING ENDOSCOPIC LINEAR CUTTER, 60MM: Brand: ECHELON FLEX

## (undated) DEVICE — SNAR POLYP CAPTIVATOR MICROHEX 13 240CM

## (undated) DEVICE — Device

## (undated) DEVICE — ENDOGATOR AUXILIARY WATER JET CONNECTOR: Brand: ENDOGATOR

## (undated) DEVICE — CLMP STD 25CM DISP

## (undated) DEVICE — GLV SURG BIOGEL LTX PF 8

## (undated) DEVICE — THE DISPOSABLE ROTH NET FOREIGN BODY STANDARD RETRIEVAL DEVICE IS USED IN THE ENDOSCOPIC RETRIEVAL OF FOREIGN BODY, FOOD BOLUS AND EXCISED TISSUE SUCH AS POLYPS.: Brand: ROTH NET

## (undated) DEVICE — SENSR O2 OXIMAX FNGR A/ 18IN NONSTR

## (undated) DEVICE — KT CLN CLEANOR SCPE

## (undated) DEVICE — MINI TREK CORONARY DILATATION CATHETER 2.0 MM X 12 MM / RAPID-EXCHANGE: Brand: MINI TREK

## (undated) DEVICE — PAD LAP CHOLE: Brand: MEDLINE INDUSTRIES, INC.

## (undated) DEVICE — TOWEL,OR,DSP,ST,BLUE,STD,4/PK,20PK/CS: Brand: MEDLINE

## (undated) DEVICE — DEV TORQ GW HOT/PINK

## (undated) DEVICE — ENDOPATH XCEL WITH OPTIVIEW TECHNOLOGY UNIVERSAL TROCAR STABILITY SLEEVES: Brand: ENDOPATH XCEL OPTIVIEW

## (undated) DEVICE — GLV SURG SENSICARE PI ORTHO PF SZ7 LF STRL

## (undated) DEVICE — Device: Brand: MEDEX

## (undated) DEVICE — ENDOPATH XCEL WITH OPTIVIEW TECHNOLOGY BLADELESS TROCARS WITH STABILITY SLEEVES: Brand: ENDOPATH XCEL OPTIVIEW

## (undated) DEVICE — PK TURNOVER RM ADV

## (undated) DEVICE — SKIN PREP TRAY W/CHG: Brand: MEDLINE INDUSTRIES, INC.

## (undated) DEVICE — 2, DISPOSABLE SUCTION/IRRIGATOR WITHOUT DISPOSABLE TIP: Brand: STRYKEFLOW

## (undated) DEVICE — SOLIDIFIER LIQUI LOC PLUS 2000CC

## (undated) DEVICE — YANKAUER,BULB TIP WITH VENT: Brand: ARGYLE

## (undated) DEVICE — CUFF,BP,DISP,1 TUBE,ADULT,HP: Brand: MEDLINE

## (undated) DEVICE — PK CATH CARD 30 CA/4

## (undated) DEVICE — SUT VIC 3/0 SH 36IN VCP527H

## (undated) DEVICE — APPL CHLORAPREP HI/LITE 26ML ORNG

## (undated) DEVICE — BANDAGE,GAUZE,BULKEE II,4.5"X4.1YD,STRL: Brand: MEDLINE

## (undated) DEVICE — MYNXGRIP 6F/7F: Brand: MYNXGRIP

## (undated) DEVICE — CONMED SCOPE SAVER BITE BLOCK, 20X27 MM: Brand: SCOPE SAVER

## (undated) DEVICE — MONOPOLAR METZENBAUM SCISSOR, MINI BLADE TIP, DISPOSABLE: Brand: MONOPOLAR METZENBAUM SCISSOR, MINI BLADE TIP, DISPOSABLE